# Patient Record
Sex: FEMALE | Race: BLACK OR AFRICAN AMERICAN | NOT HISPANIC OR LATINO | Employment: OTHER | URBAN - METROPOLITAN AREA
[De-identification: names, ages, dates, MRNs, and addresses within clinical notes are randomized per-mention and may not be internally consistent; named-entity substitution may affect disease eponyms.]

---

## 2023-03-21 PROBLEM — R11.0 NAUSEA IN ADULT: Status: ACTIVE | Noted: 2023-03-21

## 2023-03-21 PROBLEM — M54.50 LOW BACK PAIN, UNSPECIFIED: Status: ACTIVE | Noted: 2023-03-21

## 2023-03-21 PROBLEM — M25.519 TRIGGER POINT OF SHOULDER REGION: Status: ACTIVE | Noted: 2023-03-21

## 2023-03-21 PROBLEM — E55.9 HYPOVITAMINOSIS D: Status: ACTIVE | Noted: 2023-03-21

## 2023-03-21 PROBLEM — K59.00 CONSTIPATION: Status: ACTIVE | Noted: 2023-03-21

## 2023-03-21 PROBLEM — M62.838 NECK MUSCLE SPASM: Status: ACTIVE | Noted: 2023-03-21

## 2023-03-21 PROBLEM — G56.01 CARPAL TUNNEL SYNDROME, RIGHT: Status: ACTIVE | Noted: 2023-03-21

## 2023-03-21 PROBLEM — M26.609 TMJ DYSFUNCTION: Status: ACTIVE | Noted: 2023-03-21

## 2023-03-21 PROBLEM — S16.1XXA CERVICAL STRAIN: Status: ACTIVE | Noted: 2023-03-21

## 2023-03-21 PROBLEM — E55.9 VITAMIN D DEFICIENCY: Status: ACTIVE | Noted: 2023-03-21

## 2023-03-21 PROBLEM — J30.2 SEASONAL ALLERGIES: Status: ACTIVE | Noted: 2023-03-21

## 2023-03-21 PROBLEM — N89.8 VAGINAL DRYNESS: Status: ACTIVE | Noted: 2023-03-21

## 2023-03-21 PROBLEM — R10.9 ABDOMINAL PAIN: Status: ACTIVE | Noted: 2023-03-21

## 2023-03-21 PROBLEM — J45.20 ASTHMA, INTERMITTENT (HHS-HCC): Status: ACTIVE | Noted: 2023-03-21

## 2023-03-21 PROBLEM — T78.40XA ALLERGIC REACTION: Status: ACTIVE | Noted: 2023-03-21

## 2023-03-21 PROBLEM — G89.29 CHRONIC BACK PAIN: Status: ACTIVE | Noted: 2023-03-21

## 2023-03-21 PROBLEM — L05.91 PILONIDAL CYST: Status: ACTIVE | Noted: 2023-03-21

## 2023-03-21 PROBLEM — R73.9 BORDERLINE HYPERGLYCEMIA: Status: ACTIVE | Noted: 2023-03-21

## 2023-03-21 PROBLEM — D72.829 LEUKOCYTOSIS: Status: ACTIVE | Noted: 2023-03-21

## 2023-03-21 PROBLEM — I87.1 NUTCRACKER PHENOMENON OF RENAL VEIN: Status: ACTIVE | Noted: 2023-03-21

## 2023-03-21 PROBLEM — R10.2 FEMALE PELVIC PAIN: Status: ACTIVE | Noted: 2023-03-21

## 2023-03-21 PROBLEM — M54.9 CHRONIC BACK PAIN: Status: ACTIVE | Noted: 2023-03-21

## 2023-03-21 PROBLEM — N46.9 INFERTILITY MALE: Status: ACTIVE | Noted: 2023-03-21

## 2023-03-21 PROBLEM — H93.11 SUBJECTIVE TINNITUS OF RIGHT EAR: Status: ACTIVE | Noted: 2023-03-21

## 2023-03-21 PROBLEM — N94.89 PELVIC CONGESTION SYNDROME: Status: ACTIVE | Noted: 2023-03-21

## 2023-03-21 PROBLEM — N89.8 VAGINAL DISCHARGE: Status: ACTIVE | Noted: 2023-03-21

## 2023-03-21 PROBLEM — L05.01 PILONIDAL CYST WITH ABSCESS: Status: ACTIVE | Noted: 2023-03-21

## 2023-03-21 PROBLEM — G56.02 CARPAL TUNNEL SYNDROME, LEFT: Status: ACTIVE | Noted: 2023-03-21

## 2023-03-21 PROBLEM — R04.0 FREQUENT NOSEBLEEDS: Status: ACTIVE | Noted: 2023-03-21

## 2023-03-21 PROBLEM — R68.82 LOW LIBIDO: Status: ACTIVE | Noted: 2023-03-21

## 2023-03-21 PROBLEM — K58.1 IRRITABLE BOWEL SYNDROME WITH CONSTIPATION: Status: ACTIVE | Noted: 2023-03-21

## 2023-03-21 PROBLEM — J30.89 ENVIRONMENTAL AND SEASONAL ALLERGIES: Status: ACTIVE | Noted: 2023-03-21

## 2023-03-21 PROBLEM — G62.9 NEUROPATHY: Status: ACTIVE | Noted: 2023-03-21

## 2023-03-21 PROBLEM — I48.92 ATRIAL FLUTTER (MULTI): Status: ACTIVE | Noted: 2023-03-21

## 2023-03-21 PROBLEM — R10.9 LEFT FLANK PAIN: Status: ACTIVE | Noted: 2023-03-21

## 2023-03-21 PROBLEM — M41.9 SCOLIOSIS: Status: ACTIVE | Noted: 2023-03-21

## 2023-03-21 PROBLEM — H93.11 TINNITUS, RIGHT EAR: Status: ACTIVE | Noted: 2023-03-21

## 2023-03-21 PROBLEM — N93.9 ABNORMAL UTERINE BLEEDING (AUB): Status: ACTIVE | Noted: 2023-03-21

## 2023-03-21 PROBLEM — S39.012A LUMBAR STRAIN, INITIAL ENCOUNTER: Status: ACTIVE | Noted: 2023-03-21

## 2023-03-21 PROBLEM — H90.3 BILATERAL SENSORINEURAL HEARING LOSS: Status: ACTIVE | Noted: 2023-03-21

## 2023-03-21 PROBLEM — F32.A DEPRESSION: Status: ACTIVE | Noted: 2023-03-21

## 2023-03-21 PROBLEM — G56.03 CARPAL TUNNEL SYNDROME, BILATERAL UPPER LIMBS: Status: ACTIVE | Noted: 2023-03-21

## 2023-03-21 PROBLEM — K14.8 TONGUE BITING: Status: ACTIVE | Noted: 2023-03-21

## 2023-03-21 PROBLEM — R68.89 SENSATION OF FEELING COLD: Status: ACTIVE | Noted: 2023-03-21

## 2023-03-21 PROBLEM — R51.9 SINUS HEADACHE: Status: ACTIVE | Noted: 2023-03-21

## 2023-03-21 RX ORDER — ALBUTEROL SULFATE 0.83 MG/ML
2.5 SOLUTION RESPIRATORY (INHALATION) EVERY 4 HOURS PRN
COMMUNITY
Start: 2016-07-26

## 2023-03-21 RX ORDER — FLUTICASONE PROPIONATE 50 MCG
1 SPRAY, SUSPENSION (ML) NASAL DAILY
COMMUNITY
Start: 2019-06-03 | End: 2023-11-22 | Stop reason: ALTCHOICE

## 2023-03-21 RX ORDER — ALBUTEROL SULFATE 90 UG/1
1-2 AEROSOL, METERED RESPIRATORY (INHALATION) EVERY 4 HOURS PRN
COMMUNITY
End: 2023-05-26 | Stop reason: SDUPTHER

## 2023-03-21 RX ORDER — LACTULOSE 10 G/15ML
10 SOLUTION ORAL 2 TIMES DAILY
COMMUNITY
End: 2024-01-11 | Stop reason: WASHOUT

## 2023-03-21 RX ORDER — HYDROCORTISONE 25 MG/G
CREAM TOPICAL
COMMUNITY
Start: 2022-08-18

## 2023-03-21 RX ORDER — CALCIUM CARBONATE 300MG(750)
1 TABLET,CHEWABLE ORAL DAILY
COMMUNITY
Start: 2020-03-09 | End: 2023-05-26 | Stop reason: ALTCHOICE

## 2023-03-21 RX ORDER — IBUPROFEN 600 MG/1
600 TABLET ORAL EVERY 8 HOURS PRN
COMMUNITY
End: 2024-05-08

## 2023-03-21 RX ORDER — PREGABALIN 25 MG/1
1 CAPSULE ORAL 3 TIMES DAILY
COMMUNITY
Start: 2022-07-13 | End: 2023-05-26 | Stop reason: ALTCHOICE

## 2023-03-21 RX ORDER — POLYETHYLENE GLYCOL 3350 17 G/17G
POWDER, FOR SOLUTION ORAL
COMMUNITY
Start: 2021-07-27 | End: 2023-05-26 | Stop reason: ALTCHOICE

## 2023-03-21 RX ORDER — ACETAMINOPHEN 500 MG
5000 TABLET ORAL DAILY
COMMUNITY
End: 2023-05-26 | Stop reason: ALTCHOICE

## 2023-03-21 RX ORDER — HYOSCYAMINE SULFATE 0.12 MG/1
0.12 TABLET, ORALLY DISINTEGRATING ORAL 3 TIMES DAILY PRN
COMMUNITY
End: 2023-05-26 | Stop reason: ALTCHOICE

## 2023-03-21 RX ORDER — CYCLOBENZAPRINE HCL 5 MG
5-10 TABLET ORAL EVERY 8 HOURS PRN
COMMUNITY
Start: 2020-03-09 | End: 2023-12-12

## 2023-03-22 ENCOUNTER — TELEMEDICINE (OUTPATIENT)
Dept: PRIMARY CARE | Facility: CLINIC | Age: 33
End: 2023-03-22
Payer: COMMERCIAL

## 2023-03-22 DIAGNOSIS — I48.92 ATRIAL FLUTTER, UNSPECIFIED TYPE (MULTI): ICD-10-CM

## 2023-03-22 DIAGNOSIS — J06.9 VIRAL UPPER RESPIRATORY TRACT INFECTION: Primary | ICD-10-CM

## 2023-03-22 PROCEDURE — 99213 OFFICE O/P EST LOW 20 MIN: CPT | Performed by: INTERNAL MEDICINE

## 2023-03-22 RX ORDER — AMOXICILLIN AND CLAVULANATE POTASSIUM 500; 125 MG/1; MG/1
500 TABLET, FILM COATED ORAL 3 TIMES DAILY
Qty: 30 TABLET | Refills: 0 | Status: SHIPPED | OUTPATIENT
Start: 2023-03-22 | End: 2023-04-01

## 2023-03-22 RX ORDER — BENZONATATE 100 MG/1
100 CAPSULE ORAL 3 TIMES DAILY PRN
Qty: 42 CAPSULE | Refills: 0 | Status: SHIPPED | OUTPATIENT
Start: 2023-03-22 | End: 2023-04-21

## 2023-03-22 ASSESSMENT — ENCOUNTER SYMPTOMS
HEADACHES: 1
ABDOMINAL PAIN: 0
VOMITING: 0
SHORTNESS OF BREATH: 1
DIARRHEA: 0
STRIDOR: 0
TROUBLE SWALLOWING: 0
NECK PAIN: 0
COUGH: 1
SWOLLEN GLANDS: 0
SORE THROAT: 1
HOARSE VOICE: 1

## 2023-03-22 NOTE — PROGRESS NOTES
Subjective   Patient ID: Armida Adkins is a 32 y.o. female who presents for URI.  URI   This is a new problem. The current episode started in the past 7 days. Associated symptoms include congestion, coughing, headaches and a sore throat. Pertinent negatives include no abdominal pain, diarrhea, ear pain, neck pain, plugged ear sensation, swollen glands or vomiting.   Sore Throat   This is a new problem. The current episode started in the past 7 days. The problem has been rapidly worsening. Neither side of throat is experiencing more pain than the other. There has been no fever. The fever has been present for 1 to 2 days. The pain is at a severity of 8/10. Associated symptoms include congestion, coughing, headaches, a hoarse voice and shortness of breath. Pertinent negatives include no abdominal pain, diarrhea, drooling, ear discharge, ear pain, plugged ear sensation, neck pain, stridor, swollen glands, trouble swallowing or vomiting. She has had no exposure to strep or mono.       Review of Systems   HENT:  Positive for congestion, hoarse voice and sore throat. Negative for drooling, ear discharge, ear pain and trouble swallowing.    Respiratory:  Positive for cough and shortness of breath. Negative for stridor.    Gastrointestinal:  Negative for abdominal pain, diarrhea and vomiting.   Musculoskeletal:  Negative for neck pain.   Neurological:  Positive for headaches.       Objective   Physical Exam    Assessment/Plan   Problem List Items Addressed This Visit          Circulatory    Atrial flutter (CMS/HCC)     Other Visit Diagnoses       Viral upper respiratory tract infection    -  Primary    Relevant Medications    amoxicillin-pot clavulanate (Augmentin) 500-125 mg tablet    benzonatate (Tessalon) 100 mg capsule        An interactive audio and video telecommunication system which permits real time communications between the patient (at the originating site) and provider (at the distant site) was utilized to  provide this telehealth service.    Verbal consent was requested and obtained from the patient on the day of encounter.  Viral URI.  Supportive therapy with fluids, PRN NSAIDs or Tylenol and Tessalon pearls for cough.  Use saline spray every 2-3 hrs.  Start Augmentin as well for complicated symptoms and duration.           Lorin Chung MD

## 2023-04-07 LAB
ANION GAP IN SER/PLAS: 10 MMOL/L (ref 10–20)
CALCIUM (MG/DL) IN SER/PLAS: 9.2 MG/DL (ref 8.6–10.6)
CARBON DIOXIDE, TOTAL (MMOL/L) IN SER/PLAS: 28 MMOL/L (ref 21–32)
CHLORIDE (MMOL/L) IN SER/PLAS: 104 MMOL/L (ref 98–107)
CREATININE (MG/DL) IN SER/PLAS: 0.78 MG/DL (ref 0.5–1.05)
ERYTHROCYTE DISTRIBUTION WIDTH (RATIO) BY AUTOMATED COUNT: 12.4 % (ref 11.5–14.5)
ERYTHROCYTE MEAN CORPUSCULAR HEMOGLOBIN CONCENTRATION (G/DL) BY AUTOMATED: 33 G/DL (ref 32–36)
ERYTHROCYTE MEAN CORPUSCULAR VOLUME (FL) BY AUTOMATED COUNT: 84 FL (ref 80–100)
ERYTHROCYTES (10*6/UL) IN BLOOD BY AUTOMATED COUNT: 4.42 X10E12/L (ref 4–5.2)
GFR FEMALE: >90 ML/MIN/1.73M2
GLUCOSE (MG/DL) IN SER/PLAS: 97 MG/DL (ref 74–99)
HEMATOCRIT (%) IN BLOOD BY AUTOMATED COUNT: 37.3 % (ref 36–46)
HEMOGLOBIN (G/DL) IN BLOOD: 12.3 G/DL (ref 12–16)
INR IN PPP BY COAGULATION ASSAY: 1.2 (ref 0.9–1.1)
LEUKOCYTES (10*3/UL) IN BLOOD BY AUTOMATED COUNT: 10.2 X10E9/L (ref 4.4–11.3)
NRBC (PER 100 WBCS) BY AUTOMATED COUNT: 0 /100 WBC (ref 0–0)
PLATELETS (10*3/UL) IN BLOOD AUTOMATED COUNT: 325 X10E9/L (ref 150–450)
POTASSIUM (MMOL/L) IN SER/PLAS: 3.9 MMOL/L (ref 3.5–5.3)
PROTHROMBIN TIME (PT) IN PPP BY COAGULATION ASSAY: 13.9 SEC (ref 9.8–13.4)
SODIUM (MMOL/L) IN SER/PLAS: 138 MMOL/L (ref 136–145)
UREA NITROGEN (MG/DL) IN SER/PLAS: 7 MG/DL (ref 6–23)

## 2023-04-10 ENCOUNTER — HOSPITAL ENCOUNTER (OUTPATIENT)
Dept: DATA CONVERSION | Facility: HOSPITAL | Age: 33
End: 2023-04-10
Attending: INTERNAL MEDICINE | Admitting: INTERNAL MEDICINE
Payer: COMMERCIAL

## 2023-04-10 DIAGNOSIS — I47.10 SUPRAVENTRICULAR TACHYCARDIA (CMS-HCC): ICD-10-CM

## 2023-05-16 ENCOUNTER — APPOINTMENT (OUTPATIENT)
Dept: PRIMARY CARE | Facility: CLINIC | Age: 33
End: 2023-05-16
Payer: COMMERCIAL

## 2023-05-26 ENCOUNTER — OFFICE VISIT (OUTPATIENT)
Dept: PRIMARY CARE | Facility: CLINIC | Age: 33
End: 2023-05-26
Payer: COMMERCIAL

## 2023-05-26 VITALS — BODY MASS INDEX: 22.31 KG/M2 | DIASTOLIC BLOOD PRESSURE: 60 MMHG | WEIGHT: 130 LBS | SYSTOLIC BLOOD PRESSURE: 100 MMHG

## 2023-05-26 DIAGNOSIS — R43.8 BLOODY TASTE IN MOUTH: ICD-10-CM

## 2023-05-26 DIAGNOSIS — Z00.00 ROUTINE GENERAL MEDICAL EXAMINATION AT HEALTH CARE FACILITY: Primary | ICD-10-CM

## 2023-05-26 DIAGNOSIS — F32.A DEPRESSION, UNSPECIFIED DEPRESSION TYPE: ICD-10-CM

## 2023-05-26 DIAGNOSIS — J45.20 MILD INTERMITTENT ASTHMA WITHOUT COMPLICATION (HHS-HCC): ICD-10-CM

## 2023-05-26 PROCEDURE — G0439 PPPS, SUBSEQ VISIT: HCPCS | Performed by: INTERNAL MEDICINE

## 2023-05-26 PROCEDURE — 1036F TOBACCO NON-USER: CPT | Performed by: INTERNAL MEDICINE

## 2023-05-26 PROCEDURE — 99213 OFFICE O/P EST LOW 20 MIN: CPT | Performed by: INTERNAL MEDICINE

## 2023-05-26 RX ORDER — ALBUTEROL SULFATE 90 UG/1
1-2 AEROSOL, METERED RESPIRATORY (INHALATION) EVERY 4 HOURS PRN
Qty: 18 G | Refills: 2 | Status: SHIPPED | OUTPATIENT
Start: 2023-05-26

## 2023-05-26 RX ORDER — ETONOGESTREL AND ETHINYL ESTRADIOL VAGINAL RING .015; .12 MG/D; MG/D
RING VAGINAL
COMMUNITY
Start: 2023-04-24 | End: 2024-05-20 | Stop reason: SDUPTHER

## 2023-05-26 ASSESSMENT — PATIENT HEALTH QUESTIONNAIRE - PHQ9
SUM OF ALL RESPONSES TO PHQ9 QUESTIONS 1 AND 2: 2
1. LITTLE INTEREST OR PLEASURE IN DOING THINGS: SEVERAL DAYS
2. FEELING DOWN, DEPRESSED OR HOPELESS: SEVERAL DAYS

## 2023-05-26 ASSESSMENT — ENCOUNTER SYMPTOMS
GASTROINTESTINAL NEGATIVE: 1
CONSTITUTIONAL NEGATIVE: 1
RESPIRATORY NEGATIVE: 1
CARDIOVASCULAR NEGATIVE: 1

## 2023-05-26 NOTE — PROGRESS NOTES
Subjective   Reason for Visit: Armida Adkins is an 32 y.o. female here for a Medicare Wellness visit.   The patient is being seen for the subsequent annual wellness visit and follow up.  Past Medical, Surgical and Family History: reviewed and updated in chart.   Interval History: Patient has not been hospitalized previously.   Medications and Supplements: Review of all medications by a prescribing practitioner or clinical pharmacist (such as prescriptions, OTCs, herbal therapies and supplements) documented in the medical record.    No, the patient is not using opioids.   Health Risk Assessment:. Paper HRA completed by patient and scanned into chart.   Depression/Suicide Screening:  .   Done  No falls in the past year.  No recent hospitalizations.  Advance care planning completed.         Reviewed all medications by prescribing practitioner or clinical pharmacist (such as prescriptions, OTCs, herbal therapies and supplements) and documented in the medical record.    Epistaxis (Nose Bleed)     Med Refill        Patient Care Team:  Lorin Chung MD as PCP - General     Review of Systems   Constitutional: Negative.    HENT:  Positive for nosebleeds.    Respiratory: Negative.     Cardiovascular: Negative.    Gastrointestinal: Negative.        Objective   Vitals:  /60   Wt 59 kg (130 lb)   BMI 22.31 kg/m²       Physical Exam  Constitutional:       Appearance: She is well-developed.   Cardiovascular:      Rate and Rhythm: Normal rate and regular rhythm.      Heart sounds: Normal heart sounds. No murmur heard.  Pulmonary:      Effort: Pulmonary effort is normal.      Breath sounds: Normal breath sounds.   Abdominal:      General: Bowel sounds are normal.      Palpations: Abdomen is soft.         Assessment/Plan   Problem List Items Addressed This Visit          Nervous    Bloody taste in mouth    Relevant Orders    Referral to ENT       Respiratory    Asthma, intermittent    Relevant Medications     albuterol 90 mcg/actuation inhaler       Other    Depression    Relevant Orders    Referral to [facility]     Other Visit Diagnoses       Routine general medical examination at health care facility    -  Primary        Follow up and wellness  Has blood taste in the mouth  Recommend ENT referral  Also recommend dental referral for nightguard placements    A flutter  Follow up with cards    Depression  Off and on  Mostly stress related  Psychology referral placed

## 2023-08-10 LAB
ATRIAL RATE: 76 BPM
P AXIS: 70 DEGREES
P OFFSET: 203 MS
P ONSET: 150 MS
PR INTERVAL: 154 MS
Q ONSET: 227 MS
QRS COUNT: 12 BEATS
QRS DURATION: 92 MS
QT INTERVAL: 368 MS
QTC CALCULATION(BAZETT): 414 MS
QTC FREDERICIA: 398 MS
R AXIS: 83 DEGREES
T AXIS: 63 DEGREES
T OFFSET: 411 MS
VENTRICULAR RATE: 76 BPM

## 2023-08-23 PROBLEM — K21.9 GERD (GASTROESOPHAGEAL REFLUX DISEASE): Status: ACTIVE | Noted: 2023-08-23

## 2023-08-23 PROBLEM — N95.2 VAGINAL ATROPHY: Status: ACTIVE | Noted: 2023-08-23

## 2023-08-23 PROBLEM — J32.9 CHRONIC SINUSITIS: Status: ACTIVE | Noted: 2023-08-23

## 2023-08-23 PROBLEM — I47.19 AVNRT (AV NODAL RE-ENTRY TACHYCARDIA) (CMS-HCC): Status: ACTIVE | Noted: 2023-08-23

## 2023-08-23 PROBLEM — J02.9 ACUTE PHARYNGITIS: Status: ACTIVE | Noted: 2023-08-23

## 2023-08-23 PROBLEM — J01.90 ACUTE SINUSITIS: Status: ACTIVE | Noted: 2023-08-23

## 2023-08-23 PROBLEM — J34.2 DEVIATED NASAL SEPTUM: Status: ACTIVE | Noted: 2023-08-23

## 2023-08-23 RX ORDER — DULOXETIN HYDROCHLORIDE 60 MG/1
1 CAPSULE, DELAYED RELEASE ORAL NIGHTLY
COMMUNITY
Start: 2023-03-30 | End: 2023-12-12

## 2023-09-14 NOTE — H&P
History & Physical Reviewed:   Pregnant/Lactating:  ·  Are You Pregnant no   ·  Are You Currently Breastfeeding no     I have reviewed the History and Physical dated:  30-Mar-2023   History and Physical reviewed and relevant findings noted. Patient examined to review pertinent physical  findings.: No significant changes   Home Medications Reviewed: no changes noted   Allergies Reviewed: no changes noted       Airway/Sedation Assessment:  ·  Emotional Status calm   ·  Neurologic alert & oriented x 3   ·  Respiratory clear to auscultation   ·  Cardiovascular rhythm & rate regular   ·  GI/ soft, nontender     · Pulses present: Pedal Left, Pedal Right, Radial Left, Radial Right     ·  Mouth Opening OK yes   ·  Neck Flexibility OK yes   ·  Loose Teeth no   ·  Oropharyngeal Classification Class I   ·  ASA PS Classification ASA III   ·  Sedation Plan moderate sedation       ERAS (Enhanced Recovery After Surgery):  ·  ERAS Patient: no     Consent:   COVID-19 Consent:  ·  COVID-19 Risk Consent Surgeon has reviewed key risks related to the risk of adarsh COVID-19 and if they contract COVID-19 what the risks are.     Assessment/Plan:   ·  Assessment and Plan    32 Year old female with SVT presents for RFCA with Dr Turpin.       Electronic Signatures:  May Roblero (PAC)  (Signed 10-Apr-2023 13:20)   Authored: History & Physical Reviewed, Airway/Sedation,  ERAS, Consent, Assessment/Plan, Note Completion      Last Updated: 10-Apr-2023 13:20 by May Roblero (PAC)

## 2023-10-02 ENCOUNTER — TELEPHONE (OUTPATIENT)
Dept: OBSTETRICS AND GYNECOLOGY | Facility: CLINIC | Age: 33
End: 2023-10-02

## 2023-10-02 NOTE — TELEPHONE ENCOUNTER
Patient will send paperwork for return to work for October 9.    Paperwork received and ready for signature.

## 2023-10-03 ENCOUNTER — OFFICE VISIT (OUTPATIENT)
Dept: OTOLARYNGOLOGY | Facility: CLINIC | Age: 33
End: 2023-10-03
Payer: COMMERCIAL

## 2023-10-03 VITALS — BODY MASS INDEX: 21.97 KG/M2 | WEIGHT: 128 LBS

## 2023-10-03 DIAGNOSIS — J01.91 ACUTE RECURRENT SINUSITIS, UNSPECIFIED LOCATION: Primary | ICD-10-CM

## 2023-10-03 DIAGNOSIS — J31.0 CHRONIC RHINITIS: ICD-10-CM

## 2023-10-03 DIAGNOSIS — R04.0 FREQUENT NOSEBLEEDS: ICD-10-CM

## 2023-10-03 PROCEDURE — 1036F TOBACCO NON-USER: CPT | Performed by: GENERAL PRACTICE

## 2023-10-03 PROCEDURE — 99213 OFFICE O/P EST LOW 20 MIN: CPT | Performed by: GENERAL PRACTICE

## 2023-10-03 RX ORDER — MOMETASONE FUROATE 50 UG/1
2 SPRAY, METERED NASAL 2 TIMES DAILY
Qty: 17 G | Refills: 1 | Status: SHIPPED | OUTPATIENT
Start: 2023-10-03 | End: 2023-10-27

## 2023-10-03 ASSESSMENT — ENCOUNTER SYMPTOMS: DEPRESSION: 0

## 2023-10-20 ENCOUNTER — TELEPHONE (OUTPATIENT)
Dept: OBSTETRICS AND GYNECOLOGY | Facility: CLINIC | Age: 33
End: 2023-10-20
Payer: COMMERCIAL

## 2023-10-20 ENCOUNTER — TELEPHONE (OUTPATIENT)
Dept: RADIOLOGY | Facility: CLINIC | Age: 33
End: 2023-10-20

## 2023-10-20 NOTE — TELEPHONE ENCOUNTER
Patient wants to scheduled trigger point injections.    Will discuss with Dr. Stanton and further advise.

## 2023-10-26 DIAGNOSIS — J31.0 CHRONIC RHINITIS: ICD-10-CM

## 2023-10-27 RX ORDER — MOMETASONE FUROATE 50 UG/1
2 SPRAY, METERED NASAL 2 TIMES DAILY
Qty: 10 ML | Refills: 1 | Status: SHIPPED | OUTPATIENT
Start: 2023-10-27 | End: 2023-11-28

## 2023-11-22 ENCOUNTER — OFFICE VISIT (OUTPATIENT)
Dept: VASCULAR SURGERY | Facility: CLINIC | Age: 33
End: 2023-11-22
Payer: COMMERCIAL

## 2023-11-22 VITALS
DIASTOLIC BLOOD PRESSURE: 73 MMHG | HEIGHT: 64 IN | BODY MASS INDEX: 22.14 KG/M2 | WEIGHT: 129.7 LBS | SYSTOLIC BLOOD PRESSURE: 112 MMHG | HEART RATE: 92 BPM

## 2023-11-22 DIAGNOSIS — R10.2 PELVIC PAIN: ICD-10-CM

## 2023-11-22 DIAGNOSIS — R10.30 LOWER ABDOMINAL PAIN: Primary | ICD-10-CM

## 2023-11-22 PROCEDURE — 99214 OFFICE O/P EST MOD 30 MIN: CPT | Performed by: SURGERY

## 2023-11-22 PROCEDURE — 1036F TOBACCO NON-USER: CPT | Performed by: SURGERY

## 2023-11-22 ASSESSMENT — PATIENT HEALTH QUESTIONNAIRE - PHQ9
SUM OF ALL RESPONSES TO PHQ9 QUESTIONS 1 AND 2: 0
1. LITTLE INTEREST OR PLEASURE IN DOING THINGS: NOT AT ALL
2. FEELING DOWN, DEPRESSED OR HOPELESS: NOT AT ALL

## 2023-11-22 ASSESSMENT — COLUMBIA-SUICIDE SEVERITY RATING SCALE - C-SSRS
1. IN THE PAST MONTH, HAVE YOU WISHED YOU WERE DEAD OR WISHED YOU COULD GO TO SLEEP AND NOT WAKE UP?: NO
6. HAVE YOU EVER DONE ANYTHING, STARTED TO DO ANYTHING, OR PREPARED TO DO ANYTHING TO END YOUR LIFE?: NO
2. HAVE YOU ACTUALLY HAD ANY THOUGHTS OF KILLING YOURSELF?: NO

## 2023-11-22 NOTE — H&P (VIEW-ONLY)
F/U REASON: chronic pelvic pain, post-coital pain    CURRENT ENCOUNTER:  Armida Adkins is 33 y.o. female here for follow up of  chronic pelvic pain, post-coital pain.  Interval history is notable for laparoscopic peritoneal stripping for endometriosis workup which was negative.  She was not able to schedule appointments with the pelvic floor therapist nor the scoliosis surgeons.  Some time has passed since my referral so it is somewhat unclear what the scenario is worth that prevented these things.    We reviewed her symptoms once again.  She remains with pain with intercourse as well as some lingering postcoital symptoms.  Her left flank anterior pain remains intermittent and random with mostly anterior based and somewhat towards the back.    We discussed the importance of following through with her prior referrals which I will help her with once again.  I would otherwise proceed with a diagnostic venogram from the neck to evaluate her abdominal and pelvic venous anatomy and evaluate for compression and reflux to see if it correlates with her symptoms.  She denies any leg symptoms including pain or edema.    Meds:  Current Outpatient Medications:     albuterol 2.5 mg /3 mL (0.083 %) nebulizer solution, Inhale 3 mL (2.5 mg) every 4 hours if needed for wheezing., Disp: , Rfl:     albuterol 90 mcg/actuation inhaler, Inhale 1-2 puffs every 4 hours if needed for shortness of breath., Disp: 18 g, Rfl: 2    cyclobenzaprine (Flexeril) 5 mg tablet, Take 1-2 tablets (5-10 mg) by mouth every 8 hours if needed for muscle spasms., Disp: , Rfl:     DULoxetine (Cymbalta) 60 mg DR capsule, Take 1 capsule (60 mg) by mouth once daily at bedtime., Disp: , Rfl:     etonogestreL-ethinyl estradioL (Nuvaring) 0.12-0.015 mg/24 hr vaginal ring, Insert into the vagina., Disp: , Rfl:     hydrocortisone 2.5 % cream, Apply twice daily to affected areas, Disp: , Rfl:     lactulose 10 gram/15 mL (15 mL) solution, Take 10 g by mouth in the  morning and 10 g before bedtime., Disp: , Rfl:     mometasone (Nasonex) 50 mcg/actuation nasal spray, ADMINISTER 2 SPRAYS INTO EACH NOSTRIL 2 TIMES A DAY., Disp: 10 mL, Rfl: 1    naloxone (Narcan) 4 mg/0.1 mL nasal spray, INSTILL 1 SPRAY IN ONE NOSTRIL AS NEEDED FOR ACCIDENTAL OPIOID OVERDOSE; REPEAT WITH SECOND DOSE IN 2-3 MINUTES IF NO RESPONSE, Disp: 2 each, Rfl: 0    oxyCODONE (Roxicodone) 5 mg/5 mL solution, TAKE 5 ML BY MOUTH EVERY 6 HOURS AS NEEDED FOR MODERATE PAIN (PAIN SCORE 4-6), Disp: 50 mL, Rfl: 0    acetaminophen (Tylenol) 160 mg/5 mL liquid, TAKE 15 ML BY MOUTH EVERY 6 HOURS AS NEEDED, Disp: 105 mL, Rfl: 0    ibuprofen 600 mg tablet, Take 1 tablet (600 mg) by mouth every 8 hours if needed for mild pain (1 - 3)., Disp: , Rfl:     Allergies:   Allergies   Allergen Reactions    Benzonatate Unknown    Erythromycin-Benzoyl Peroxide Unknown    Fruit Flavor Unknown    Hydrocortisone Unknown    Pollen Extracts Unknown    Sulfamethoxazole-Trimethoprim Swelling       ROS:  Review of Systems otherwise unremarkable    Objective:  Vitals:  Vitals:    11/22/23 1519   BP: 112/73   Pulse: 92        Exam:  No distress  Breathing comfortably.  No upper or lower extremity edema nor varicosities.  Abdomen fairly benign with healed laparoscopic incisions.  No evidence of lower extremity varicosities.    Labs:  Lab Results   Component Value Date    WBC 10.3 09/10/2023    WBC 13.9 (H) 09/06/2023    WBC 8.2 08/30/2023    HGB 11.0 (L) 09/10/2023    HGB 11.2 (L) 09/06/2023    HGB 12.2 08/30/2023    HCT 33.6 (L) 09/10/2023    HCT 32.7 (L) 09/06/2023    HCT 37.6 08/30/2023    MCV 85 09/10/2023    MCV 81 09/06/2023    MCV 84 08/30/2023     09/10/2023     Lab Results   Component Value Date    CREATININE 0.83 09/10/2023    CREATININE 0.78 09/06/2023    CREATININE 0.74 08/30/2023    BUN 9 09/10/2023    BUN 10 09/06/2023    BUN 9 08/30/2023     (L) 09/10/2023     09/06/2023     08/30/2023    K 3.6 09/10/2023     K 3.6 09/06/2023    K 4.2 08/30/2023     09/10/2023     09/06/2023     08/30/2023    CO2 26 09/10/2023    CO2 24 09/06/2023    CO2 28 08/30/2023       Assessment & Plan:  Armida Adkins is 33 y.o. female with pelvic pain, intermittent left mostly anterior flank pain.     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 Sanchez 2/9/2023. Pt states that she has been experience intermittent left flank pain and pelvic pain for approximately three years The pelvic pain leads to significant dyspareunia. Pt also experiences left abdominal pain during intercourse that is severe and bloating with abdominal pain during menses. No alleviating or aggravating pain The pain is intermittent  Describes it as sharp and severe She underwent a venogram which demonstrated a pressure differential between the left renal vein and IVC of 4 mmHg  After the procedure the pt went into afib with RVR and underwent cardioversion  Now she is on Eliquis  She is currently wearing Biopatch monitor      + left flank pain at random times, no hematuria, + abdominal bloating     do not think her left anterior upper quadrant pain is related to nutcracker left renal vein compression physiology  scoliosis and chest wall deformity may have something to do with this - will refer.   somewhat dilated gonadal vein - pelvic pain - venous hypertension may be a partial contributor to this      plan:  1) we discussion several things including possible pelvic congestion syndrome.   2) i do not think you have nutcracker sydnrome. I do think your left upper rib cage pain may be related to a musculoskeletal issue and perhaps to your scoliosis - i will reach out to some colleagues to see who would be best to see you    1) we will proceed with an outpatient venogram to evaluate the veins in the pelvis and any varicose veins  2) this will be 12/11/2023 at [facility] in [facility].   3) my office  will call as you near your surgery date to give you further instructions  4) I'll reach to Dr. Stanton and Ermelinda about your prior referrals.     Marianna Ordaz MD, MHS, RPVI  , [employer]  Director, [facility], [facility] [place]  Co-Director, Vascular Laboratories, [facility] [place]  Division of Vascular Surgery and Endovascular Therapy  [facility]

## 2023-11-22 NOTE — PATIENT INSTRUCTIONS
It was a pleasure taking care of you today and appreciate your seeing us at our [facility] [place] [facility].     Today's plan is as follows:  1) we will proceed with an outpatient venogram to evaluate the veins in the pelvis and any varicose veins  2) this will be 12/11/2023 at [facility] in [facility].   3) my office will call as you near your surgery date to give you further instructions  4) I'll reach to Dr. Stanton and Ermelinda about your prior referrals.       Please call the office with any questions at [phone].   You can speak to our secretaries or our clinical nurses for specific questions.   For Vein Center specific questions, you can also call [phone] or email at [email or web address]  If you need coordinating your appointments and testing you can do these at the  or by calling my office shortly after your visit.

## 2023-11-28 DIAGNOSIS — J31.0 CHRONIC RHINITIS: ICD-10-CM

## 2023-11-28 RX ORDER — MOMETASONE FUROATE 50 UG/1
2 SPRAY, METERED NASAL 2 TIMES DAILY
Qty: 17 G | Refills: 2 | Status: SHIPPED | OUTPATIENT
Start: 2023-11-28 | End: 2023-12-12

## 2023-11-30 ENCOUNTER — APPOINTMENT (OUTPATIENT)
Dept: RADIOLOGY | Facility: HOSPITAL | Age: 33
End: 2023-11-30
Payer: COMMERCIAL

## 2023-11-30 ENCOUNTER — HOSPITAL ENCOUNTER (EMERGENCY)
Facility: HOSPITAL | Age: 33
Discharge: HOME | End: 2023-11-30
Attending: EMERGENCY MEDICINE
Payer: COMMERCIAL

## 2023-11-30 VITALS
HEART RATE: 84 BPM | BODY MASS INDEX: 21.85 KG/M2 | OXYGEN SATURATION: 100 % | RESPIRATION RATE: 17 BRPM | HEIGHT: 64 IN | WEIGHT: 128 LBS | DIASTOLIC BLOOD PRESSURE: 71 MMHG | TEMPERATURE: 98.7 F | SYSTOLIC BLOOD PRESSURE: 97 MMHG

## 2023-11-30 DIAGNOSIS — R07.9 CHEST PAIN, UNSPECIFIED TYPE: Primary | ICD-10-CM

## 2023-11-30 LAB
ALBUMIN SERPL BCP-MCNC: 4.2 G/DL (ref 3.4–5)
ALP SERPL-CCNC: 73 U/L (ref 33–110)
ALT SERPL W P-5'-P-CCNC: 9 U/L (ref 7–45)
ANION GAP SERPL CALC-SCNC: 12 MMOL/L (ref 10–20)
AST SERPL W P-5'-P-CCNC: 15 U/L (ref 9–39)
B-HCG SERPL-ACNC: <2 MIU/ML
BASOPHILS # BLD AUTO: 0.05 X10*3/UL (ref 0–0.1)
BASOPHILS NFR BLD AUTO: 0.6 %
BILIRUB SERPL-MCNC: 0.4 MG/DL (ref 0–1.2)
BUN SERPL-MCNC: 8 MG/DL (ref 6–23)
CALCIUM SERPL-MCNC: 9.4 MG/DL (ref 8.6–10.3)
CARDIAC TROPONIN I PNL SERPL HS: <3 NG/L (ref 0–13)
CHLORIDE SERPL-SCNC: 102 MMOL/L (ref 98–107)
CO2 SERPL-SCNC: 27 MMOL/L (ref 21–32)
CREAT SERPL-MCNC: 0.83 MG/DL (ref 0.5–1.05)
EOSINOPHIL # BLD AUTO: 0.23 X10*3/UL (ref 0–0.7)
EOSINOPHIL NFR BLD AUTO: 2.9 %
ERYTHROCYTE [DISTWIDTH] IN BLOOD BY AUTOMATED COUNT: 12.6 % (ref 11.5–14.5)
GFR SERPL CREATININE-BSD FRML MDRD: >90 ML/MIN/1.73M*2
GLUCOSE SERPL-MCNC: 77 MG/DL (ref 74–99)
HCT VFR BLD AUTO: 38.9 % (ref 36–46)
HGB BLD-MCNC: 12.9 G/DL (ref 12–16)
IMM GRANULOCYTES # BLD AUTO: 0.02 X10*3/UL (ref 0–0.7)
IMM GRANULOCYTES NFR BLD AUTO: 0.2 % (ref 0–0.9)
LYMPHOCYTES # BLD AUTO: 1.99 X10*3/UL (ref 1.2–4.8)
LYMPHOCYTES NFR BLD AUTO: 24.8 %
MAGNESIUM SERPL-MCNC: 2.2 MG/DL (ref 1.6–2.4)
MCH RBC QN AUTO: 27.4 PG (ref 26–34)
MCHC RBC AUTO-ENTMCNC: 33.2 G/DL (ref 32–36)
MCV RBC AUTO: 83 FL (ref 80–100)
MONOCYTES # BLD AUTO: 0.36 X10*3/UL (ref 0.1–1)
MONOCYTES NFR BLD AUTO: 4.5 %
NEUTROPHILS # BLD AUTO: 5.39 X10*3/UL (ref 1.2–7.7)
NEUTROPHILS NFR BLD AUTO: 67 %
NRBC BLD-RTO: 0 /100 WBCS (ref 0–0)
PLATELET # BLD AUTO: 294 X10*3/UL (ref 150–450)
POTASSIUM SERPL-SCNC: 4 MMOL/L (ref 3.5–5.3)
PROT SERPL-MCNC: 8 G/DL (ref 6.4–8.2)
RBC # BLD AUTO: 4.71 X10*6/UL (ref 4–5.2)
SODIUM SERPL-SCNC: 137 MMOL/L (ref 136–145)
TSH SERPL-ACNC: 1.14 MIU/L (ref 0.44–3.98)
WBC # BLD AUTO: 8 X10*3/UL (ref 4.4–11.3)

## 2023-11-30 PROCEDURE — 84484 ASSAY OF TROPONIN QUANT: CPT | Performed by: EMERGENCY MEDICINE

## 2023-11-30 PROCEDURE — 80053 COMPREHEN METABOLIC PANEL: CPT | Performed by: EMERGENCY MEDICINE

## 2023-11-30 PROCEDURE — 2500000004 HC RX 250 GENERAL PHARMACY W/ HCPCS (ALT 636 FOR OP/ED): Performed by: EMERGENCY MEDICINE

## 2023-11-30 PROCEDURE — 2500000001 HC RX 250 WO HCPCS SELF ADMINISTERED DRUGS (ALT 637 FOR MEDICARE OP): Performed by: EMERGENCY MEDICINE

## 2023-11-30 PROCEDURE — 84702 CHORIONIC GONADOTROPIN TEST: CPT | Performed by: EMERGENCY MEDICINE

## 2023-11-30 PROCEDURE — 83735 ASSAY OF MAGNESIUM: CPT | Performed by: EMERGENCY MEDICINE

## 2023-11-30 PROCEDURE — 99284 EMERGENCY DEPT VISIT MOD MDM: CPT | Mod: 25 | Performed by: EMERGENCY MEDICINE

## 2023-11-30 PROCEDURE — 71045 X-RAY EXAM CHEST 1 VIEW: CPT | Mod: FOREIGN READ | Performed by: RADIOLOGY

## 2023-11-30 PROCEDURE — 84443 ASSAY THYROID STIM HORMONE: CPT | Performed by: EMERGENCY MEDICINE

## 2023-11-30 PROCEDURE — 85025 COMPLETE CBC W/AUTO DIFF WBC: CPT | Performed by: EMERGENCY MEDICINE

## 2023-11-30 PROCEDURE — 36415 COLL VENOUS BLD VENIPUNCTURE: CPT | Performed by: EMERGENCY MEDICINE

## 2023-11-30 PROCEDURE — 96374 THER/PROPH/DIAG INJ IV PUSH: CPT

## 2023-11-30 PROCEDURE — 71045 X-RAY EXAM CHEST 1 VIEW: CPT | Mod: FY,FR

## 2023-11-30 RX ORDER — FAMOTIDINE 10 MG/ML
20 INJECTION INTRAVENOUS ONCE
Status: COMPLETED | OUTPATIENT
Start: 2023-11-30 | End: 2023-11-30

## 2023-11-30 RX ORDER — ALUMINUM HYDROXIDE, MAGNESIUM HYDROXIDE, AND SIMETHICONE 1200; 120; 1200 MG/30ML; MG/30ML; MG/30ML
10 SUSPENSION ORAL ONCE
Status: COMPLETED | OUTPATIENT
Start: 2023-11-30 | End: 2023-11-30

## 2023-11-30 RX ORDER — FAMOTIDINE 20 MG/1
20 TABLET, FILM COATED ORAL ONCE
Status: COMPLETED | OUTPATIENT
Start: 2023-11-30 | End: 2023-11-30

## 2023-11-30 RX ADMIN — FAMOTIDINE 20 MG: 20 TABLET, FILM COATED ORAL at 10:56

## 2023-11-30 RX ADMIN — ALUMINUM HYDROXIDE, MAGNESIUM HYDROXIDE, AND DIMETHICONE 10 ML: 200; 20; 200 SUSPENSION ORAL at 10:56

## 2023-11-30 RX ADMIN — FAMOTIDINE 20 MG: 10 INJECTION, SOLUTION INTRAVENOUS at 11:37

## 2023-11-30 ASSESSMENT — PAIN SCALES - GENERAL
PAINLEVEL_OUTOF10: 4
PAINLEVEL_OUTOF10: 4

## 2023-11-30 ASSESSMENT — PAIN - FUNCTIONAL ASSESSMENT: PAIN_FUNCTIONAL_ASSESSMENT: 0-10

## 2023-11-30 ASSESSMENT — PAIN DESCRIPTION - DESCRIPTORS: DESCRIPTORS: ACHING

## 2023-11-30 NOTE — ED PROVIDER NOTES
Limitations to History: None  Additional History Obtained from: None    HPI:    Patient presenting to the emergency department due to chest pain.  Patient states that she went to sleep last night normal state of health, woke up this morning and began having left-sided sharp nonradiating chest pain.  She states her symptoms started approximately 7:30 AM and have been constant since then.  She states her symptoms were made worse with an episode of vomiting.  Denies any remitting factors.  Denies any associated palpitations.  Did have associated dizziness as well as 1 episode of vomiting.  Denies any recent pain or swelling in her lower extremities, recent travel or use of hormonal medications.  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  She is not currently on medications for this.  Denies any associated palpitations.  She states she had previously been on anticoagulation related to this but was weaned off of it.  Her most recent episode was after surgical procedure several months ago.  Denies any other exacerbating remitting factors to her symptoms.  Review of systems is otherwise negative.    ------------------------------------------------------------------------------------------------------------------------------------------  Physical Exam:    ED Triage Vitals [11/30/23 0908]   Temp Heart Rate Resp BP   37.1 °C (98.7 °F) 89 17 114/82      SpO2 Temp src Heart Rate Source Patient Position   100 % -- -- --      BP Location FiO2 (%)     -- --        VS: As documented in the triage note and EMR flowsheet from this visit were reviewed.  General: Well appearing. No acute distress.   Eyes: Pupils round and reactive. No scleral icterus. No conjunctival injection  HENT: Atraumatic. Normocephalic. Moist mucous membranes. Trachea midline  CV: RRR, No MRG. No pedal edema appreciated.  Resp: Clear to auscultation bilaterally. Non-labored.    GI: Soft, nontender to palpation.  Nondistended. No guarding, rigidity or rebound  Skin: Warm, dry, intact. No systemic rashes or lesions appreciated.  Extremities: No deformities or pain out of proportion; pulses intact   Neuro: Alert. No focal motor or sensory deficits observed. Speech fluent. Answers questions appropriately.   Psych: Appropriate. [place].    ------------------------------------------------------------------------------------------------------------------------------------------    Medical Decision Making  33-year-old female presenting to the emergency department due to chest pain.  On exam patient is awake and alert and well-appearing.  Hemodynamically stable.  No signs of dysrhythmia on the monitor, no signs of fluid overload, and patient without risk factors for PE.  PERC negative.  EKG reviewed reviewed and interpreted by me, please see ED course for interpretation.  Due to the patient's cardiac history will obtain basic blood work including electrolytes, TSH and troponin.  If the patient symptoms are improving and if the patient's blood work is overall unremarkable feel the patient is stable for discharge home.  Concern for possible GI etiology due to worsening with vomiting.  Patient otherwise well-appearing at this time.  In agreement with the plan of care.      External Records Reviewed: I reviewed recent and relevant outside records including: n/a  Escalation of Care: Appropriate for Discharge per ED course/MDM  Social Determinants Affecting Care:Not applicable  Prescription Drug Consideration: gi cocktail  Diagnostic testing considered: xray, labs  Discussion of Management with Other Providers: I discussed the patient/results with: n/a    Objective Data  I have independently interpreted the following labs, imaging studies and MDM added to ED Course  Labs Reviewed   COMPREHENSIVE METABOLIC PANEL - Normal       Result Value    Glucose 77      Sodium 137      Potassium 4.0      Chloride 102      Bicarbonate 27      Anion Gap  12      Urea Nitrogen 8      Creatinine 0.83      eGFR >90      Calcium 9.4      Albumin 4.2      Alkaline Phosphatase 73      Total Protein 8.0      AST 15      Bilirubin, Total 0.4      ALT 9     TROPONIN I, HIGH SENSITIVITY - Normal    Troponin I, High Sensitivity <3      Narrative:     Less than 99th percentile of normal range cutoff-  Female and children under 18 years old <14 ng/L; Male <21 ng/L: Negative  Repeat testing should be performed if clinically indicated.     Female and children under 18 years old 14-50 ng/L; Male 21-50 ng/L:  Consistent with possible cardiac damage and possible increased clinical   risk. Serial measurements may help to assess extent of myocardial damage.     >50 ng/L: Consistent with cardiac damage, increased clinical risk and  myocardial infarction. Serial measurements may help assess extent of   myocardial damage.      NOTE: Children less than 1 year old may have higher baseline troponin   levels and results should be interpreted in conjunction with the overall   clinical context.     NOTE: Troponin I testing is performed using a different   testing methodology at [facility] than at other   [facility]. Direct result comparisons should only   be made within the same method.   MAGNESIUM - Normal    Magnesium 2.20     TSH WITH REFLEX TO FREE T4 IF ABNORMAL - Normal    Thyroid Stimulating Hormone 1.14      Narrative:     TSH testing is performed using different testing methodology at [facility] than at other [facility]. Direct result comparisons should only be made within the same method.     HUMAN CHORIONIC GONADOTROPIN, SERUM QUANTITATIVE - Normal    HCG, Beta-Quantitative <2      Narrative:      Total HCG measurement is performed using the [employer] Access   Immunoassay which detects intact HCG and free beta HCG subunit.    This test is not indicated for use as a tumor marker.   HCG testing is performed using a different test  methodology at [facility] than other [facility]. Direct result comparison   should only be made within the same method.       CBC WITH AUTO DIFFERENTIAL    WBC 8.0      nRBC 0.0      RBC 4.71      Hemoglobin 12.9      Hematocrit 38.9      MCV 83      MCH 27.4      MCHC 33.2      RDW 12.6      Platelets 294      Neutrophils % 67.0      Immature Granulocytes %, Automated 0.2      Lymphocytes % 24.8      Monocytes % 4.5      Eosinophils % 2.9      Basophils % 0.6      Neutrophils Absolute 5.39      Immature Granulocytes Absolute, Automated 0.02      Lymphocytes Absolute 1.99      Monocytes Absolute 0.36      Eosinophils Absolute 0.23      Basophils Absolute 0.05         XR chest 1 view   Final Result   No acute process.   Signed by Ronald Carrillo MD          ED Course  ED Course as of 11/30/23 1244   u Nov 30, 2023   1011 EKG reviewed and interpreted by me: sinus rhythm at 85 bpm, normal intervals; no corina/depression/twi; no change from previous EKG on 9/2023   [LP]   1127 Labs reviewed, overall unremarkable [LP]   1146 Chest x-ray showed chronic findings of scoliosis, no acute cardiac findings, hCG currently pending, labs otherwise unremarkable.  Feel the patient will likely be able to be discharged home with outpatient follow-up. [LP]   1243 On reevaluation the patient's pain is feeling improved after GI cocktail in the emergency department.  Labs here are stable, pregnancy test negative feel the patient symptoms are likely secondary to GI etiology due to the improvement with a GI cocktail and worsening with vomiting.  Patient hemodynamically stable, in agreement for discharge home with outpatient management.  Patient to be discharged home in stable condition. [LP]      ED Course User Index  [LP] Kelsea Lazar DO         Diagnoses as of 11/30/23 1244   Chest pain, unspecified type       Procedure  Procedures    Disposition: discharge    Kelsea Lazar DO  Emergency Medicine  Medical  Toxicology     Kelsea Lazar,   11/30/23 1242

## 2023-11-30 NOTE — ED TRIAGE NOTES
PT TO ED FROM HOME WITH CARDIAC PCI HX FOR C/O N/V LEFT SIDED NON RADIATING CONSISTENT CP THAT STARTED 1 HOUR PRIOR TO ARRIVAL.

## 2023-12-01 ENCOUNTER — PREP FOR PROCEDURE (OUTPATIENT)
Dept: VASCULAR SURGERY | Facility: HOSPITAL | Age: 33
End: 2023-12-01
Payer: COMMERCIAL

## 2023-12-01 DIAGNOSIS — R10.2 PELVIC PAIN: Primary | ICD-10-CM

## 2023-12-01 DIAGNOSIS — R09.89 PAINFUL VEINS: ICD-10-CM

## 2023-12-01 DIAGNOSIS — R52 PAINFUL VEINS: ICD-10-CM

## 2023-12-05 ENCOUNTER — APPOINTMENT (OUTPATIENT)
Dept: PRIMARY CARE | Facility: CLINIC | Age: 33
End: 2023-12-05
Payer: COMMERCIAL

## 2023-12-05 ENCOUNTER — APPOINTMENT (OUTPATIENT)
Dept: OBSTETRICS AND GYNECOLOGY | Facility: CLINIC | Age: 33
End: 2023-12-05
Payer: COMMERCIAL

## 2023-12-07 ENCOUNTER — TELEPHONE (OUTPATIENT)
Dept: VASCULAR MEDICINE | Facility: HOSPITAL | Age: 33
End: 2023-12-07
Payer: COMMERCIAL

## 2023-12-07 NOTE — TELEPHONE ENCOUNTER
Spoke with patient regarding pre-op instructions for 12/11 procedure with Dr. Ordaz.  Patient advised to be NPO after midnight night prior, no meds to take the a.m. of surgery, and need for transportation.  Patient had recent labs 11/30 so no need for updated bloodwork.  Per patient, no longer taking any blood thinners.  Patient verbalized understanding of all instructions. Encouraged her to call with questions/concerns in the meantime.

## 2023-12-11 ENCOUNTER — ANESTHESIA EVENT (OUTPATIENT)
Dept: OPERATING ROOM | Facility: HOSPITAL | Age: 33
End: 2023-12-11
Payer: COMMERCIAL

## 2023-12-11 ENCOUNTER — APPOINTMENT (OUTPATIENT)
Dept: RADIOLOGY | Facility: HOSPITAL | Age: 33
End: 2023-12-11
Payer: COMMERCIAL

## 2023-12-11 ENCOUNTER — HOSPITAL ENCOUNTER (OUTPATIENT)
Facility: HOSPITAL | Age: 33
Setting detail: OUTPATIENT SURGERY
Discharge: HOME | End: 2023-12-11
Attending: SURGERY | Admitting: SURGERY
Payer: COMMERCIAL

## 2023-12-11 ENCOUNTER — ANESTHESIA (OUTPATIENT)
Dept: OPERATING ROOM | Facility: HOSPITAL | Age: 33
End: 2023-12-11
Payer: COMMERCIAL

## 2023-12-11 VITALS
WEIGHT: 133.82 LBS | HEIGHT: 64 IN | HEART RATE: 72 BPM | OXYGEN SATURATION: 98 % | DIASTOLIC BLOOD PRESSURE: 70 MMHG | TEMPERATURE: 97.5 F | RESPIRATION RATE: 16 BRPM | SYSTOLIC BLOOD PRESSURE: 109 MMHG | BODY MASS INDEX: 22.85 KG/M2

## 2023-12-11 DIAGNOSIS — Z98.890 HISTORY OF GENERAL ANESTHESIA: ICD-10-CM

## 2023-12-11 DIAGNOSIS — R10.2 PELVIC PAIN: Primary | ICD-10-CM

## 2023-12-11 DIAGNOSIS — J31.0 CHRONIC RHINITIS: ICD-10-CM

## 2023-12-11 DIAGNOSIS — M54.16 RADICULOPATHY, LUMBAR REGION: ICD-10-CM

## 2023-12-11 DIAGNOSIS — R09.89 PAINFUL VEINS: ICD-10-CM

## 2023-12-11 DIAGNOSIS — R52 PAINFUL VEINS: ICD-10-CM

## 2023-12-11 DIAGNOSIS — Z87.39 PERSONAL HISTORY OF OTHER DISEASES OF THE MUSCULOSKELETAL SYSTEM AND CONNECTIVE TISSUE: ICD-10-CM

## 2023-12-11 DIAGNOSIS — G89.29 CHRONIC LEFT-SIDED THORACIC BACK PAIN: ICD-10-CM

## 2023-12-11 DIAGNOSIS — M54.6 CHRONIC LEFT-SIDED THORACIC BACK PAIN: ICD-10-CM

## 2023-12-11 LAB — PREGNANCY TEST URINE, POC: NEGATIVE

## 2023-12-11 PROCEDURE — 75822 VEIN X-RAY ARMS/LEGS: CPT | Performed by: SURGERY

## 2023-12-11 PROCEDURE — 75825 VEIN X-RAY TRUNK: CPT | Performed by: SURGERY

## 2023-12-11 PROCEDURE — 37252 INTRVASC US NONCORONARY 1ST: CPT | Performed by: SURGERY

## 2023-12-11 PROCEDURE — 81025 URINE PREGNANCY TEST: CPT | Performed by: STUDENT IN AN ORGANIZED HEALTH CARE EDUCATION/TRAINING PROGRAM

## 2023-12-11 PROCEDURE — 3700000002 HC GENERAL ANESTHESIA TIME - EACH INCREMENTAL 1 MINUTE: Performed by: SURGERY

## 2023-12-11 PROCEDURE — 76937 US GUIDE VASCULAR ACCESS: CPT | Performed by: SURGERY

## 2023-12-11 PROCEDURE — 2500000005 HC RX 250 GENERAL PHARMACY W/O HCPCS: Performed by: SURGERY

## 2023-12-11 PROCEDURE — A36010 PR PLACE CATH IN VEIN,SVC,IVC

## 2023-12-11 PROCEDURE — 3600000004 HC OR TIME - INITIAL BASE CHARGE - PROCEDURE LEVEL FOUR: Performed by: SURGERY

## 2023-12-11 PROCEDURE — 3600000009 HC OR TIME - EACH INCREMENTAL 1 MINUTE - PROCEDURE LEVEL FOUR: Performed by: SURGERY

## 2023-12-11 PROCEDURE — 7100000002 HC RECOVERY ROOM TIME - EACH INCREMENTAL 1 MINUTE: Performed by: SURGERY

## 2023-12-11 PROCEDURE — 7100000001 HC RECOVERY ROOM TIME - INITIAL BASE CHARGE: Performed by: SURGERY

## 2023-12-11 PROCEDURE — 36010 PLACE CATHETER IN VEIN: CPT | Performed by: SURGERY

## 2023-12-11 PROCEDURE — 2500000004 HC RX 250 GENERAL PHARMACY W/ HCPCS (ALT 636 FOR OP/ED): Performed by: SURGERY

## 2023-12-11 PROCEDURE — 3700000001 HC GENERAL ANESTHESIA TIME - INITIAL BASE CHARGE: Performed by: SURGERY

## 2023-12-11 PROCEDURE — 2500000004 HC RX 250 GENERAL PHARMACY W/ HCPCS (ALT 636 FOR OP/ED)

## 2023-12-11 PROCEDURE — 7100000010 HC PHASE TWO TIME - EACH INCREMENTAL 1 MINUTE: Performed by: SURGERY

## 2023-12-11 PROCEDURE — 75831 VEIN X-RAY KIDNEY: CPT | Performed by: SURGERY

## 2023-12-11 PROCEDURE — C1753 CATH, INTRAVAS ULTRASOUND: HCPCS | Performed by: SURGERY

## 2023-12-11 PROCEDURE — 37253 INTRVASC US NONCORONARY ADDL: CPT | Performed by: SURGERY

## 2023-12-11 PROCEDURE — 2550000001 HC RX 255 CONTRASTS: Performed by: SURGERY

## 2023-12-11 PROCEDURE — C1769 GUIDE WIRE: HCPCS | Performed by: SURGERY

## 2023-12-11 PROCEDURE — 7100000009 HC PHASE TWO TIME - INITIAL BASE CHARGE: Performed by: SURGERY

## 2023-12-11 PROCEDURE — A36010 PR PLACE CATH IN VEIN,SVC,IVC: Performed by: ANESTHESIOLOGY

## 2023-12-11 PROCEDURE — C1887 CATHETER, GUIDING: HCPCS | Performed by: SURGERY

## 2023-12-11 PROCEDURE — 2500000004 HC RX 250 GENERAL PHARMACY W/ HCPCS (ALT 636 FOR OP/ED): Performed by: ANESTHESIOLOGY

## 2023-12-11 PROCEDURE — C1894 INTRO/SHEATH, NON-LASER: HCPCS | Performed by: SURGERY

## 2023-12-11 PROCEDURE — 2720000007 HC OR 272 NO HCPCS: Performed by: SURGERY

## 2023-12-11 RX ORDER — PROPOFOL 10 MG/ML
INJECTION, EMULSION INTRAVENOUS CONTINUOUS PRN
Status: DISCONTINUED | OUTPATIENT
Start: 2023-12-11 | End: 2023-12-11

## 2023-12-11 RX ORDER — ONDANSETRON HYDROCHLORIDE 2 MG/ML
4 INJECTION, SOLUTION INTRAVENOUS ONCE AS NEEDED
Status: DISCONTINUED | OUTPATIENT
Start: 2023-12-11 | End: 2023-12-11 | Stop reason: HOSPADM

## 2023-12-11 RX ORDER — ALBUTEROL SULFATE 0.83 MG/ML
2.5 SOLUTION RESPIRATORY (INHALATION) EVERY 6 HOURS PRN
Status: CANCELLED | OUTPATIENT
Start: 2023-12-11

## 2023-12-11 RX ORDER — ALBUTEROL SULFATE 90 UG/1
1-2 AEROSOL, METERED RESPIRATORY (INHALATION) EVERY 4 HOURS PRN
Status: CANCELLED | OUTPATIENT
Start: 2023-12-11

## 2023-12-11 RX ORDER — HYDROMORPHONE HYDROCHLORIDE 1 MG/ML
0.5 INJECTION, SOLUTION INTRAMUSCULAR; INTRAVENOUS; SUBCUTANEOUS EVERY 5 MIN PRN
Status: DISCONTINUED | OUTPATIENT
Start: 2023-12-11 | End: 2023-12-11 | Stop reason: HOSPADM

## 2023-12-11 RX ORDER — ETONOGESTREL AND ETHINYL ESTRADIOL VAGINAL RING .015; .12 MG/D; MG/D
1 RING VAGINAL
Status: CANCELLED | OUTPATIENT
Start: 2023-12-12

## 2023-12-11 RX ORDER — DROPERIDOL 2.5 MG/ML
0.62 INJECTION, SOLUTION INTRAMUSCULAR; INTRAVENOUS ONCE AS NEEDED
Status: DISCONTINUED | OUTPATIENT
Start: 2023-12-11 | End: 2023-12-11 | Stop reason: HOSPADM

## 2023-12-11 RX ORDER — IBUPROFEN 600 MG/1
600 TABLET ORAL EVERY 8 HOURS PRN
Status: CANCELLED | OUTPATIENT
Start: 2023-12-11

## 2023-12-11 RX ORDER — SODIUM CHLORIDE, SODIUM LACTATE, POTASSIUM CHLORIDE, CALCIUM CHLORIDE 600; 310; 30; 20 MG/100ML; MG/100ML; MG/100ML; MG/100ML
100 INJECTION, SOLUTION INTRAVENOUS CONTINUOUS
Status: DISCONTINUED | OUTPATIENT
Start: 2023-12-11 | End: 2023-12-11 | Stop reason: HOSPADM

## 2023-12-11 RX ORDER — HYDROMORPHONE HYDROCHLORIDE 1 MG/ML
0.2 INJECTION, SOLUTION INTRAMUSCULAR; INTRAVENOUS; SUBCUTANEOUS EVERY 5 MIN PRN
Status: DISCONTINUED | OUTPATIENT
Start: 2023-12-11 | End: 2023-12-11 | Stop reason: HOSPADM

## 2023-12-11 RX ORDER — IODIXANOL 320 MG/ML
INJECTION, SOLUTION INTRAVASCULAR AS NEEDED
Status: DISCONTINUED | OUTPATIENT
Start: 2023-12-11 | End: 2023-12-11 | Stop reason: HOSPADM

## 2023-12-11 RX ORDER — ACETAMINOPHEN 325 MG/1
650 TABLET ORAL EVERY 4 HOURS PRN
Status: DISCONTINUED | OUTPATIENT
Start: 2023-12-11 | End: 2023-12-11 | Stop reason: HOSPADM

## 2023-12-11 RX ORDER — FENTANYL CITRATE 50 UG/ML
INJECTION, SOLUTION INTRAMUSCULAR; INTRAVENOUS AS NEEDED
Status: DISCONTINUED | OUTPATIENT
Start: 2023-12-11 | End: 2023-12-11

## 2023-12-11 RX ORDER — MIDAZOLAM HYDROCHLORIDE 1 MG/ML
INJECTION INTRAMUSCULAR; INTRAVENOUS AS NEEDED
Status: DISCONTINUED | OUTPATIENT
Start: 2023-12-11 | End: 2023-12-11

## 2023-12-11 RX ORDER — ACETAMINOPHEN 160 MG/5ML
500 LIQUID ORAL EVERY 6 HOURS PRN
Status: CANCELLED | OUTPATIENT
Start: 2023-12-11

## 2023-12-11 RX ORDER — MEPERIDINE HYDROCHLORIDE 50 MG/ML
12.5 INJECTION INTRAMUSCULAR; INTRAVENOUS; SUBCUTANEOUS EVERY 10 MIN PRN
Status: DISCONTINUED | OUTPATIENT
Start: 2023-12-11 | End: 2023-12-11 | Stop reason: HOSPADM

## 2023-12-11 RX ORDER — LACTULOSE 10 G/15ML
10 SOLUTION ORAL 2 TIMES DAILY
Status: CANCELLED | OUTPATIENT
Start: 2023-12-11

## 2023-12-11 RX ORDER — DULOXETIN HYDROCHLORIDE 60 MG/1
60 CAPSULE, DELAYED RELEASE ORAL NIGHTLY
Status: CANCELLED | OUTPATIENT
Start: 2023-12-11

## 2023-12-11 RX ADMIN — FENTANYL CITRATE 50 MCG: 50 INJECTION, SOLUTION INTRAMUSCULAR; INTRAVENOUS at 07:43

## 2023-12-11 RX ADMIN — SODIUM CHLORIDE, SODIUM LACTATE, POTASSIUM CHLORIDE, AND CALCIUM CHLORIDE: 600; 310; 30; 20 INJECTION, SOLUTION INTRAVENOUS at 08:59

## 2023-12-11 RX ADMIN — HYDROMORPHONE HYDROCHLORIDE 0.5 MG: 1 INJECTION, SOLUTION INTRAMUSCULAR; INTRAVENOUS; SUBCUTANEOUS at 10:11

## 2023-12-11 RX ADMIN — MIDAZOLAM HYDROCHLORIDE 1 MG: 1 INJECTION, SOLUTION INTRAMUSCULAR; INTRAVENOUS at 08:05

## 2023-12-11 RX ADMIN — MIDAZOLAM HYDROCHLORIDE 2 MG: 1 INJECTION, SOLUTION INTRAMUSCULAR; INTRAVENOUS at 07:37

## 2023-12-11 RX ADMIN — FENTANYL CITRATE 50 MCG: 50 INJECTION, SOLUTION INTRAMUSCULAR; INTRAVENOUS at 08:02

## 2023-12-11 RX ADMIN — MIDAZOLAM HYDROCHLORIDE 1 MG: 1 INJECTION, SOLUTION INTRAMUSCULAR; INTRAVENOUS at 09:21

## 2023-12-11 RX ADMIN — SODIUM CHLORIDE, SODIUM LACTATE, POTASSIUM CHLORIDE, AND CALCIUM CHLORIDE: 600; 310; 30; 20 INJECTION, SOLUTION INTRAVENOUS at 07:30

## 2023-12-11 ASSESSMENT — COLUMBIA-SUICIDE SEVERITY RATING SCALE - C-SSRS
1. IN THE PAST MONTH, HAVE YOU WISHED YOU WERE DEAD OR WISHED YOU COULD GO TO SLEEP AND NOT WAKE UP?: NO
2. HAVE YOU ACTUALLY HAD ANY THOUGHTS OF KILLING YOURSELF?: NO
6. HAVE YOU EVER DONE ANYTHING, STARTED TO DO ANYTHING, OR PREPARED TO DO ANYTHING TO END YOUR LIFE?: NO

## 2023-12-11 ASSESSMENT — PAIN SCALES - GENERAL
PAINLEVEL_OUTOF10: 0 - NO PAIN
PAINLEVEL_OUTOF10: 8

## 2023-12-11 ASSESSMENT — PAIN - FUNCTIONAL ASSESSMENT
PAIN_FUNCTIONAL_ASSESSMENT: 0-10

## 2023-12-11 ASSESSMENT — PAIN DESCRIPTION - DESCRIPTORS: DESCRIPTORS: SPASM

## 2023-12-11 NOTE — ANESTHESIA POSTPROCEDURE EVALUATION
Patient: Armida Adkins    Procedure Summary       Date: 12/11/23 Room / Location: [facility] OR 27 / Virtual [facility] OR    Anesthesia Start: 0730 Anesthesia Stop: 1003    Procedure: ULTRASOUND RIGHT JUGULAR VEIN ACCESS, LEFT RENAL VEIN, BILATERAL ILIOFEMORAL VENOGRAPHY, AND IVUS LEFT GONADAL AND PELVIC VENOGRAM. (Neck) Diagnosis:       Pelvic pain      Painful veins      (Pelvic pain [R10.2])      (Painful veins [R09.89, R52])    Surgeons: Marianna Ordaz MD Responsible Provider: Eliazar Sevilla MD    Anesthesia Type: MAC ASA Status: 2            Anesthesia Type: MAC    Vitals Value Taken Time   /69 12/11/23 1045   Temp 36.2 °C (97.2 °F) 12/11/23 1001   Pulse 78 12/11/23 1050   Resp 12 12/11/23 1050   SpO2 98 % 12/11/23 1050   Vitals shown include unvalidated device data.    Anesthesia Post Evaluation    Patient location during evaluation: PACU  Patient participation: complete - patient cannot participate  Level of consciousness: awake  Pain management: adequate  Airway patency: patent  Cardiovascular status: acceptable  Respiratory status: acceptable  Hydration status: acceptable  Postoperative Nausea and Vomiting: none        There were no known notable events for this encounter.

## 2023-12-11 NOTE — BRIEF OP NOTE
Date: 2023  OR Location: [facility] [place]    Name: Armida Adkins, : 1990, Age: 33 y.o., MRN: [id number], Sex: female    Diagnosis  Pre-op Diagnosis     * Pelvic pain [R10.2]     * Painful veins [R09.89, R52] Post-op Diagnosis     * Pelvic pain [R10.2]     * Painful veins [R09.89, R52]     Procedures  ULTRASOUND RIGHT JUGULAR VEIN ACCESS, LEFT RENAL VEIN, BILATERAL ILIOFEMORAL VENOGRAPHY, AND IVUS LEFT GONADAL AND PELVIC VENOGRAM.  62728 - CHG VENOGRAPHY CAVAL INFERIOR SERIALOGRAPHY RS&I      Surgeons      * Marianna Ordaz - Primary    Resident/Fellow/Other Assistant:  Surgeon(s) and Role:     * Macario Poole MD - Resident - Assisting    Procedure Summary  Anesthesia: Monitor Anesthesia Care  ASA: II  Anesthesia Staff: Anesthesiologist: Eliazar Sevilla MD  C-AA: TRA Lucero  Estimated Blood Loss: 5mL  Intra-op Medications:   Medication Name Total Dose   bupivacaine PF (Marcaine) 0.25 % (2.5 mg/mL) 30 mL, lidocaine (Xylocaine) 30 mL, sodium bicarbonate 3 mEq syringe 5 mL              Anesthesia Record               Intraprocedure I/O Totals          Intake    LR 1200.00 mL    Total Intake 1200 mL          Specimen: No specimens collected     Staff:   Circulator: Gail Thompson RN  Scrub Person: Neha Johnson RN; Kandice Mazariegos    Findings:   Pelvic venous hypertension with significant varicosities in both the anterior (via anterior hypogastric vein) and posterior (via reflux from the gonadal venous system) pelvic distribution. Effacement of the left common iliac vein but without compression at the cross-over of the right common iliac artery.    Complications:  None; patient tolerated the procedure well.     Disposition: PACU - hemodynamically stable.  Condition: stable  Specimens Collected: No specimens collected  Attending Attestation: I was present and scrubbed for the entire procedure.    Macario Poole MD

## 2023-12-11 NOTE — ANESTHESIA PREPROCEDURE EVALUATION
Patient: Armida Adkins    Procedure Information       Date/Time: 12/11/23 0715    Procedure: Venogram via Lower Extremity Access    Location: [facility] OR 27 / [facility] OR    Surgeons: Marianna Ordaz MD            Relevant Problems   Anesthesia   (+) History of general anesthesia      Cardiovascular   (+) AVNRT (AV kat re-entry tachycardia)   (+) Atrial flutter (CMS/HCC)      GI   (+) GERD (gastroesophageal reflux disease)   (+) Irritable bowel syndrome with constipation      Neuro/Psych   (+) Carpal tunnel syndrome, bilateral upper limbs   (+) Carpal tunnel syndrome, left   (+) Carpal tunnel syndrome, right   (+) Depression      Pulmonary   (+) Asthma, intermittent      Musculoskeletal   (+) Carpal tunnel syndrome, bilateral upper limbs   (+) Carpal tunnel syndrome, left   (+) Carpal tunnel syndrome, right   (+) Scoliosis      Eyes, Ears, Nose, and Throat   (+) Bilateral sensorineural hearing loss       Clinical information reviewed:   Tobacco  Allergies  Meds   Med Hx  Surg Hx  OB Status  Fam Hx  Soc   Hx        NPO Detail:  NPO/Void Status  Date of Last Liquid: 12/11/23  Time of Last Liquid: 0000  Date of Last Solid: 12/11/23  Time of Last Solid: 0000         PHYSICAL EXAM    Anesthesia Plan    ASA 2     MAC     intravenous induction   Postoperative administration of opioids is intended.  Anesthetic plan and risks discussed with patient.  Use of blood products discussed with patient who.

## 2023-12-11 NOTE — ANESTHESIA PROCEDURE NOTES
Peripheral IV  Date/Time: 12/11/2023 7:05 AM  Inserted by: TRA Lucero    Placement  Needle size: 20 G  Laterality: left  Location: hand  Local anesthetic: none  Site prep: chlorhexidine  Attempts: 1

## 2023-12-11 NOTE — OP NOTE
ULTRASOUND RIGHT JUGULAR VEIN ACCESS, LEFT RENAL VEIN, BILATERAL ILIOFEMORAL VENOGRAPHY, AND IVUS LEFT GONADAL AND PELVIC VENOGRAM. Operative Note     Date: 2023  OR Location: [facility] [place] OR    Name: Armida Adkins, : 1990, Age: 33 y.o., MRN: [id number], Sex: female    Diagnosis  Pre-op Diagnosis     * Pelvic pain [R10.2]     * Painful veins [R09.89, R52] Post-op Diagnosis     * Pelvic pain [R10.2]     * Painful veins [R09.89, R52]     Procedures  ULTRASOUND RIGHT JUGULAR VEIN ACCESS, LEFT RENAL VEIN, BILATERAL ILIOFEMORAL VENOGRAPHY, AND IVUS LEFT GONADAL AND PELVIC VENOGRAM.  [id number] - CHG VENOGRAPHY CAVAL INFERIOR SERIALOGRAPHY RS&I  Left renal vein IVUS and venogram    Surgeons      * Marianna Ordaz - Primary    Resident/Fellow/Other Assistant:  Surgeon(s) and Role:     * Macario Poole MD - Resident - Assisting    Procedure Summary  Anesthesia: Monitor Anesthesia Care  ASA: II  Anesthesia Staff: Anesthesiologist: Elizaar Sevilla MD  C-AA: TRA Lucero  Estimated Blood Loss: 5mL  Intra-op Medications:   Medication Name Total Dose   bupivacaine PF (Marcaine) 0.25 % (2.5 mg/mL) 30 mL, lidocaine (Xylocaine) 30 mL, sodium bicarbonate 3 mEq syringe 5 mL              Anesthesia Record               Intraprocedure I/O Totals          Intake    LR 1200.00 mL    Total Intake 1200 mL          Specimen: No specimens collected     Staff:   Circulator: Gail Thompson RN  Scrub Person: Neha Johnson RN; Kandice Mazariegos         Drains and/or Catheters: * None in log *    Tourniquet Times:       Implants:     Findings: see op note    Indications: Armida Adkins is an 33 y.o. female who is having surgery for Pelvic pain [R10.2]  Painful veins [R09.89, R52]. She has had multiple evaluations for other potential contributors to her chronic pelvic pain and post-coital pain. We discussed proceeding with a diagnostic study and then based on this evaluation, we can discuss potential therapies.     The patient was  seen in the preoperative area. The risks, benefits, complications, treatment options, non-operative alternatives, expected recovery and outcomes were discussed with the patient. The possibilities of reaction to medication, pulmonary aspiration, injury to surrounding structures, bleeding, recurrent infection, the need for additional procedures, failure to diagnose a condition, and creating a complication requiring transfusion or operation were discussed with the patient. The patient concurred with the proposed plan, giving informed consent.  The site of surgery was properly noted/marked if necessary per policy. The patient has been actively warmed in preoperative area. Preoperative antibiotics are not indicated. Venous thrombosis prophylaxis have been ordered including bilateral sequential compression devices    Procedure Details: PROCEDURE DETAILS: The patient was brought to the operating room, was placed in the operating table in supine position with bilateral arms tucked.  No antibiotics were given as none were indicated.  This was a diagnostic venography. We did timeout and a huddle based on name, date of birth and medical record number.      DIAGNOSTIC STUDY: Subcutaneous local anesthetic was administered at our access site which was the right internal jugular vein.  We then used ultrasound to cannulate the vein under direct visualization and advanced our wire under fluoroscopy.  We made a small nick incision and then remove the dilator and wire after replacing with a micropuncture sheath.  Then a BEW Globalson wire was advanced down to the IVC under fluoroscopy guidance allowing us to upsized to a 5 Liberian 10 cm sheath.  We then heparin flush the sheath and removed the dilator and then advanced the Berenstein catheter down, bringing our wire down to the right femoral vein.    From there we did venography in ascending fashion with normal respirophasic flow, without any breath-holds.  The flow appeared to go in an  "antegrade fashion without much reflux into the pelvis and no areas of luminal irregularities noted along the course towards the IVC.  We then pulled back out catheter advanced a Glidewire to cannulate the left common iliac vein around the L4-5 level and followed this with the catheter down to the same left femoral vein.  Wire was removed and ascending venography was performed on this side with some reflux into the hypogastric on the left as well as a \"pancaking\" effect of the left common iliac vein at the May-Thurner location.  We advanced our wire back down to the femoral vein and then upsized our sheath to an 8 Polish sheath 25 cm long, to proceed with intravascular ultrasound.    Over a Bentson wire we did intravascular ultrasound on the left side from the femoral vein to the IVC noting the diameter and caliber change(s) from the external iliac vein common iliac vein and May-Thurner location with a significant stenosis greater than 60% noted at the common iliac vein compression. I assured good hydration and respiratory maneuvers were done to evaluate this fully.  We then cannulated the hypogastric vein with the Berenstein catheter after removing the IVUS for selective venography noting some dilation of the hypogastric confluence of the anterior and the posterior branches and some cross pelvic flow into the right side. We cannulated the anterior and the posterior hypogastric branches and it appeared that the anterior branch led to an impressive cluster of varicose veins towards the based of the pelvis and crossing over to the right side by way of collaterals that relate to her vaginal area and her pain.     We also cannulated the right hypogastric vein for venography with less prominence of the hypogastric confluence of the anterior and the posterior branch and less cross pelvic filling. No varicosities filled form this side.     We then proceeded to cannulate the left renal vein with the cobra C2 catheter and a " glide wire down to the left gonadal vein. We performed venography at multiple levels measuring the gonadal vein at 6-8mm or greater diameter. Associated with pelvic varicosities with continuous contrast hang up at >30 seconds. IVUS was performed over a bentson wire of the left renal vein as well as venography.       COMPLETION: We removed the wires, pulled the sheaths and held pressure for about 10 minutes with good hemostasis and our procedure was completed and a dressing was applied.  I was present for the entire case.  All counts were correct.  There were no complications.   Complications:  None; patient tolerated the procedure well.    Disposition: PACU - hemodynamically stable.  Condition: stable       Additional Details: 62cc contrast  Has multiple varicosities associated with the anterior branch of the hypogastric vein and the left refluxing gonadal vein. Also has secondarily a left common iliac vein compression.      Attending Attestation: I was present and scrubbed for the entire procedure.    Marianna Ordaz  Phone Number: [phone]

## 2023-12-12 RX ORDER — MOMETASONE FUROATE 50 UG/1
2 SPRAY, METERED NASAL 2 TIMES DAILY
Qty: 17 G | Refills: 2 | Status: SHIPPED | OUTPATIENT
Start: 2023-12-12 | End: 2024-12-11

## 2023-12-12 RX ORDER — DULOXETIN HYDROCHLORIDE 60 MG/1
CAPSULE, DELAYED RELEASE ORAL
Qty: 90 CAPSULE | Refills: 3 | Status: SHIPPED | OUTPATIENT
Start: 2023-12-12 | End: 2024-02-19 | Stop reason: HOSPADM

## 2023-12-12 RX ORDER — CYCLOBENZAPRINE HCL 5 MG
TABLET ORAL
Qty: 180 TABLET | Refills: 1 | Status: SHIPPED | OUTPATIENT
Start: 2023-12-12 | End: 2024-02-27 | Stop reason: ALTCHOICE

## 2023-12-12 ASSESSMENT — PAIN SCALES - GENERAL: PAINLEVEL_OUTOF10: 5 - MODERATE PAIN

## 2023-12-19 ENCOUNTER — APPOINTMENT (OUTPATIENT)
Dept: CARDIOLOGY | Facility: CLINIC | Age: 33
End: 2023-12-19
Payer: COMMERCIAL

## 2024-01-03 ENCOUNTER — APPOINTMENT (OUTPATIENT)
Dept: PRIMARY CARE | Facility: CLINIC | Age: 34
End: 2024-01-03
Payer: COMMERCIAL

## 2024-01-08 DIAGNOSIS — N95.2 POSTMENOPAUSAL ATROPHIC VAGINITIS: ICD-10-CM

## 2024-01-08 PROBLEM — G43.909 MIGRAINE HEADACHE: Status: ACTIVE | Noted: 2024-01-08

## 2024-01-08 RX ORDER — ESTRADIOL 0.1 MG/G
CREAM VAGINAL
COMMUNITY
Start: 2023-12-11

## 2024-01-08 RX ORDER — DEXTROMETHORPHAN HYDROBROMIDE, GUAIFENESIN 5; 100 MG/5ML; MG/5ML
650 LIQUID ORAL 2 TIMES DAILY
COMMUNITY
Start: 2023-03-23 | End: 2024-05-08

## 2024-01-08 RX ORDER — ESTRADIOL 0.1 MG/G
CREAM VAGINAL
Qty: 42.5 G | Refills: 1 | Status: SHIPPED | OUTPATIENT
Start: 2024-01-08 | End: 2024-01-11 | Stop reason: WASHOUT

## 2024-01-09 ENCOUNTER — APPOINTMENT (OUTPATIENT)
Dept: CARDIOLOGY | Facility: CLINIC | Age: 34
End: 2024-01-09
Payer: COMMERCIAL

## 2024-01-11 ENCOUNTER — OFFICE VISIT (OUTPATIENT)
Dept: OBSTETRICS AND GYNECOLOGY | Facility: CLINIC | Age: 34
End: 2024-01-11
Payer: COMMERCIAL

## 2024-01-11 VITALS
HEIGHT: 64 IN | BODY MASS INDEX: 23.05 KG/M2 | DIASTOLIC BLOOD PRESSURE: 71 MMHG | WEIGHT: 135 LBS | HEART RATE: 80 BPM | SYSTOLIC BLOOD PRESSURE: 109 MMHG

## 2024-01-11 DIAGNOSIS — R10.2 PELVIC PAIN: Primary | ICD-10-CM

## 2024-01-11 PROCEDURE — 1036F TOBACCO NON-USER: CPT | Performed by: STUDENT IN AN ORGANIZED HEALTH CARE EDUCATION/TRAINING PROGRAM

## 2024-01-11 ASSESSMENT — ENCOUNTER SYMPTOMS
HEMATOLOGIC/LYMPHATIC NEGATIVE: 0
CONSTITUTIONAL NEGATIVE: 0
PSYCHIATRIC NEGATIVE: 0
RESPIRATORY NEGATIVE: 0
GASTROINTESTINAL NEGATIVE: 0
MUSCULOSKELETAL NEGATIVE: 0
CARDIOVASCULAR NEGATIVE: 0
EYES NEGATIVE: 0
ENDOCRINE NEGATIVE: 0
NEUROLOGICAL NEGATIVE: 0
ALLERGIC/IMMUNOLOGIC NEGATIVE: 0

## 2024-01-11 ASSESSMENT — PAIN SCALES - GENERAL: PAINLEVEL: 0-NO PAIN

## 2024-01-11 NOTE — PROGRESS NOTES
Trigger Point Injection    Procedure and indications reviewed with patient prior to start of procedure.     Patient was placed in lithotomy position in stirrups. Pelvic floor muscle exam was performed demonstrating tenderness and spasm of all pelvic floor musculatur. Viscous lidocaine was placed in the vaginal vault and allowed to sit for approximately 5 minutes.  The vaginal walls were then cleansed with iodine solution. A total of 10 cc of 1% lidacine was then injected in bilateral LA muscles.  Post procedure observation for bleeding showed excellent hemostasis. Patient tolerated the procedure well.      Discussed with patient that these injections can be repeated every 2-4 weeks as long as she is noting improvement in her pain with injections.  If she requires more sessions, will consider moving on to Botox injections to the pelvic floor muscles.

## 2024-01-18 NOTE — PROGRESS NOTES
Otolaryngology - Head and Neck Surgery Outpatient New Patient Visit Note    Chief Concern:  Follow up CT    History Of Present Illness  Fabián Adkins is a 33 y.o. female with a history of  recurrent sinusitis, presenting today for review of sinus CT and symptoms.    Reports ongoing mild sinus pressure and congestion.  Minimal/no effect from augmentin    Decrease in epistaxis frequency/severity.  .       Recall     33yoF presents for evaluation of chronic cough, intermittent epistaxis and nasal congestion/obstruction.       reports a history of frequent URIs, and notes recurrent acute sinusitis 3-4x per year.    most recently treated with antibiotics 2-3mo ago for sinusitis. reports some ongoing midface fullness/pressure and PND causing sore throat and hoarseness.   denies significant prior nasal trauma/surgery  reports baseline R>L congestion.     reports a history of recurrent epistaxis up to 3x per month as well as more frequent taste of blood or metallic taste in throat.     reports mild seasonal allergic rhinitis without consistent management.     denies significant GERD history.       Past Medical History  She has a past medical history of Acute sinusitis, unspecified (02/01/2016), Encounter for initial prescription of contraceptive pills (08/10/2018), Encounter for removal of intrauterine contraceptive device (03/01/2016), Encounter for screening for malignant neoplasm of cervix (11/24/2015), Myopia, bilateral (12/01/2014), Other conditions influencing health status, Other specified health status (05/23/2017), Other specified symptoms and signs involving the digestive system and abdomen (07/09/2015), Pain in right shoulder (10/07/2020), Pelvic and perineal pain (05/28/2021), Personal history of other diseases of the digestive system (07/30/2015), Personal history of other diseases of the female genital tract (08/10/2018), Personal history of other diseases of the female genital tract (08/10/2018), Personal  Patient has no medication left.    Preferred pharmacy: [facility]   history of other diseases of the female genital tract (09/11/2015), Personal history of other diseases of the musculoskeletal system and connective tissue (10/13/2022), Personal history of other diseases of the respiratory system, Personal history of other specified conditions, and Unspecified astigmatism, bilateral (12/01/2014).    Surgical History  She has a past surgical history that includes Other surgical history (08/18/2020); Other surgical history (07/31/2015); IR angiogram inferior epigastric pelvic (2/9/2023); IR angiogram inferior epigastric pelvic (2/9/2023); and IR angiogram inferior epigastric (2/9/2023).     Social History  She reports that she has never smoked. She has never used smokeless tobacco. No history on file for alcohol use and drug use.    Family History  Family History   Problem Relation Name Age of Onset    Uterine cancer Mother      Seizures Mother      Other (cerebrovascular accident) Father      Uterine cancer Sister      Colon cancer Mother's Brother          Allergies  Benzonatate, Erythromycin-benzoyl peroxide, Fruit flavor, Hydrocortisone, Pollen extracts, and Sulfamethoxazole-trimethoprim    Review of Systems  ROS: Pertinent positives as noted in HPI.    - CONSTITUTIONAL: Does not report weight loss, fever or chills.    - HEENT:   Ear: Does not report tinnitus, vertigo, hearing loss, otalgia, otorrhea  Nose: Does not report  ,  ,  , decreased smell  Throat: Does not report pain, dysphagia, odynophagia  Larynx: Does not report hoarseness,  difficulty breathing, pain with speaking (odynophonia)  Neck: Does not report new masses, pain, swelling  Face: Does not report    ,  , swelling, numbness, weakness     - RESPIRATORY: Does not report SOB or cough.    - CV: Does not report palpitations or chest pain.     - GI: Does not report abdominal pain, nausea, vomiting or diarrhea.    - : Does not report dysuria or urinary frequency.    - MSK: Does not report myalgia or joint pain.    -  SKIN: Does not report rash or pruritus.    - NEUROLOGICAL: Does not report headache or syncope.    - PSYCHIATRIC: Does not report recent changes in mood. Does not report anxiety or depression.         Physical Exam:     GENERAL:   Alert & Oriented to person, place and time; Normal affect and appearance. Well developed and well nourished. Conversant & cooperative with examination.     HEAD:   Normocephalic, atraumatic. No sinus tenderness to palpation. Normal parotid bilaterally. Normal facial strength.     NEUROLOGIC:   Cranial nerves II-XII grossly intact, gait WNL. Normal mood and affect.    EYES:   Extraocular movements intact. Pupils equal, round, reactive to light and accommodation. No nystagmus, no ptosis. no scleral injection.    EAR:   Normal auricle. No discomfort or TTP with manipulation.   Handheld otoscopic exam showed normal external auditory canals bilaterally. No purulence or EAC inflammation. Minimal cerumen.   Right tympanic membrane clear and mobile without evidence of perforation, retraction or middle ear effusion.   Left tympanic membrane clear and mobile without evidence of perforation, retraction or middle ear effusion.     NOSE:   No external deformity. No external nasal lesions, lacerations, or scars. Nasal tip symmetrical with normal nasal valves.   Nasal cavity with essentially midline septum, dry/inflamed septal  mucosa and turbinates. No lesions, masses, purulence or polyps.     OC/OP:   Mucous membranes moist, no masses, lesions or exudates.   Normal tongue, floor of mouth, teeth, gums, lips. Normal posterior pharyngeal wall.    Normal tonsils without erythema, exudate or obvious calculi     NECK:   No neck masses or thyroid enlargement. Trachea midline. No tenderness to palpation    LYMPHATIC:   No cervical lymphadenopathy.     RESPIRATORY:   Symmetric chest elevation & no retractions. No significant hoarseness. No increased work of breathing.    CV:   No clubbing or cyanosis. No  obvious edema    Skin:   No facial rashes, vesicles or lesions.     Extremities:   No gross abnormalities      Clinic Procedure        Information review:  External sources (notes, imaging, lab results) listed below personally reviewed to aid in medical decision making process.  -  -  -    CT sinus shows no significant sinusitis.        Assessment/Plan   Problem List Items Addressed This Visit             ICD-10-CM       ENT    Frequent nosebleeds R04.0    Acute sinusitis - Primary J01.90     Other Visit Diagnoses         Codes    Chronic rhinitis     J31.0    Relevant Medications    mometasone (Nasonex) 50 mcg/actuation nasal spray            Will have pt use nasonex for rhinitis and use nasal saline gel to contiue to control for mild rhinitis.  No ongoing sinusitis on imaging.  Pt to contact clinic for acute sinusitis to arrange imaging/eval at that time.       Follow up:  -plan for follow up in clinic PRN    All of the above findings, impressions, treatment planning and follow up plans were discussed with the patient who indicated understanding.  the patient was instructed to contact or return to clinic sooner if symptoms/signs persist or worsen despite the above management.      Bakari Mae MD  Otolaryngology - Head and Neck Surgery

## 2024-01-19 ENCOUNTER — PREP FOR PROCEDURE (OUTPATIENT)
Dept: VASCULAR SURGERY | Facility: HOSPITAL | Age: 34
End: 2024-01-19
Payer: COMMERCIAL

## 2024-01-19 DIAGNOSIS — R10.2 PELVIC PAIN: Primary | ICD-10-CM

## 2024-01-25 ENCOUNTER — TELEMEDICINE (OUTPATIENT)
Dept: VASCULAR SURGERY | Facility: CLINIC | Age: 34
End: 2024-01-25
Payer: MEDICARE

## 2024-01-25 DIAGNOSIS — R10.2 PELVIC PAIN: ICD-10-CM

## 2024-01-25 DIAGNOSIS — R10.30 LOWER ABDOMINAL PAIN: Primary | ICD-10-CM

## 2024-01-25 PROBLEM — Z86.2 HISTORY OF ANEMIA: Status: ACTIVE | Noted: 2024-01-25

## 2024-01-25 PROBLEM — N92.1 METRORRHAGIA: Status: ACTIVE | Noted: 2024-01-25

## 2024-01-25 PROBLEM — M79.89 SWELLING OF LOWER LEG: Status: ACTIVE | Noted: 2024-01-25

## 2024-01-25 PROBLEM — M25.539 PAIN IN WRIST: Status: ACTIVE | Noted: 2024-01-25

## 2024-01-25 PROBLEM — Z98.890 POSTOPERATIVE STATE: Status: ACTIVE | Noted: 2024-01-25

## 2024-01-25 PROBLEM — M79.9 DISORDER OF SOFT TISSUE: Status: ACTIVE | Noted: 2024-01-25

## 2024-01-25 PROBLEM — N94.819 VULVODYNIA: Status: ACTIVE | Noted: 2024-01-25

## 2024-01-25 PROBLEM — R19.8 DIFFICULTY SWALLOWING PILLS: Status: ACTIVE | Noted: 2024-01-25

## 2024-01-25 PROBLEM — R00.0 TACHYCARDIA: Status: ACTIVE | Noted: 2023-09-06

## 2024-01-25 PROBLEM — R52 PAIN, UNSPECIFIED: Status: ACTIVE | Noted: 2023-12-01

## 2024-01-25 PROBLEM — R05.9 COUGH, UNSPECIFIED: Status: ACTIVE | Noted: 2022-10-08

## 2024-01-25 PROBLEM — I47.10 SUPRAVENTRICULAR TACHYCARDIA (CMS-HCC): Status: ACTIVE | Noted: 2023-02-10

## 2024-01-25 PROBLEM — G89.18 POSTOPERATIVE PAIN: Status: ACTIVE | Noted: 2024-01-25

## 2024-01-25 PROBLEM — N80.9 ENDOMETRIOSIS: Status: ACTIVE | Noted: 2023-03-23

## 2024-01-25 PROBLEM — Z97.5 PRESENCE OF INTRAUTERINE CONTRACEPTIVE DEVICE: Status: ACTIVE | Noted: 2024-01-25

## 2024-01-25 PROBLEM — Z20.822 CONTACT WITH AND (SUSPECTED) EXPOSURE TO COVID-19: Status: ACTIVE | Noted: 2023-03-23

## 2024-01-25 PROBLEM — N93.9 ABNORMAL VAGINAL BLEEDING: Status: ACTIVE | Noted: 2024-01-25

## 2024-01-25 ASSESSMENT — PATIENT HEALTH QUESTIONNAIRE - PHQ9
SUM OF ALL RESPONSES TO PHQ9 QUESTIONS 1 AND 2: 0
2. FEELING DOWN, DEPRESSED OR HOPELESS: NOT AT ALL
1. LITTLE INTEREST OR PLEASURE IN DOING THINGS: NOT AT ALL

## 2024-01-25 NOTE — PATIENT INSTRUCTIONS
It was a pleasure taking care of you today and appreciate your seeing us at our [facility] [place] [facility]    Based on your leg symptoms, venous insufficiency studies and potential for clinical improvement, I am recommeding the followin) I would proceed with gonadal vein embolization and sclerotherapy of your pelvic vv - we will tentatively schedule this on  at [facility]       Please call the office with any questions at [phone].   For Vein Center specific questions, particularly insurance related questions of booking your [place] intervention, you can call [phone] or email at [email or web address]    You can speak directly to my Vein Center Nurse Coordinator, Cindy Ramos, for specific questions.       Marianna Ordaz MD, MHS, RPVI  , [facility] [employer]  Director, [facility], [facility] [place]  Co-Director, Vascular Laboratories, [facility] [place]  Division of Vascular Surgery and Endovascular Therapy  [facility]

## 2024-01-26 NOTE — H&P (VIEW-ONLY)
F/U REASON: pelvic venous congestion    CURRENT ENCOUNTER:  Armida Adkins is 33 y.o. female here for follow up of  pelvic venous congestion.    Patient wanted to follow-up after recent diagnostic venogram.  We discussed her tests and the study and my recommendation for gonadal embolization and pelvic embolization with concurrent sclerotherapy to address the bulky varicosities causing pelvic chronic venous hypertension.  Patient states that she had communication with her insurance and was told she was approved for vascular embolization.    Meds:   Current Outpatient Medications:     acetaminophen (Tylenol 8 HOUR) 650 mg ER tablet, Take 1 tablet (650 mg) by mouth 2 times a day., Disp: , Rfl:     albuterol 2.5 mg /3 mL (0.083 %) nebulizer solution, Inhale 3 mL (2.5 mg) every 4 hours if needed for wheezing., Disp: , Rfl:     albuterol 90 mcg/actuation inhaler, Inhale 1-2 puffs every 4 hours if needed for shortness of breath., Disp: 18 g, Rfl: 2    cyclobenzaprine (Flexeril) 5 mg tablet, TAKE 1 OR 2 TABLETS BY MOUTH EVERY 8 HOURS AS NEEDED FOR MUSCLE PAIN/SPASMS, Disp: 180 tablet, Rfl: 1    DULoxetine (Cymbalta) 60 mg DR capsule, TAKE 1 CAPSULE EVERY EVENING (AFTER 1 WEEK OF 30MG CAPSULES), Disp: 90 capsule, Rfl: 3    estradiol (Estrace) 0.01 % (0.1 mg/gram) vaginal cream, PLACE A PEA SIZED AMOUNT ON YOUR FINGER AND APPLY TO SORE AREA OF VULVA. USE NIGHTLY FOR ONE MONTH., Disp: , Rfl:     etonogestreL-ethinyl estradioL (Nuvaring) 0.12-0.015 mg/24 hr vaginal ring, Insert into the vagina., Disp: , Rfl:     hydrocortisone 2.5 % cream, Apply twice daily to affected areas, Disp: , Rfl:     ibuprofen 600 mg tablet, Take 1 tablet (600 mg) by mouth every 8 hours if needed for mild pain (1 - 3)., Disp: , Rfl:     mometasone (Nasonex) 50 mcg/actuation nasal spray, ADMINISTER 2 SPRAYS INTO EACH NOSTRIL 2 TIMES A DAY., Disp: 17 g, Rfl: 2    Allergies:   Allergies   Allergen Reactions    Benzonatate Unknown    Coconut Itching     Erythromycin-Benzoyl Peroxide Unknown    Fruit Flavor Unknown    Hydrocortisone Unknown    Peach Itching    Pineapple Itching    Pollen Extracts Unknown    Sulfamethoxazole-Trimethoprim Swelling       ROS:  Review of Systems    otherwise unremarkable    Labs:  Lab Results   Component Value Date    WBC 8.0 11/30/2023    WBC 10.3 09/10/2023    WBC 13.9 (H) 09/06/2023    HGB 12.9 11/30/2023    HGB 11.0 (L) 09/10/2023    HGB 11.2 (L) 09/06/2023    HCT 38.9 11/30/2023    HCT 33.6 (L) 09/10/2023    HCT 32.7 (L) 09/06/2023    MCV 83 11/30/2023    MCV 85 09/10/2023    MCV 81 09/06/2023     11/30/2023     Lab Results   Component Value Date    CREATININE 0.83 11/30/2023    CREATININE 0.83 09/10/2023    CREATININE 0.78 09/06/2023    BUN 8 11/30/2023    BUN 9 09/10/2023    BUN 10 09/06/2023     11/30/2023     (L) 09/10/2023     09/06/2023    K 4.0 11/30/2023    K 3.6 09/10/2023    K 3.6 09/06/2023     11/30/2023     09/10/2023     09/06/2023    CO2 27 11/30/2023    CO2 26 09/10/2023    CO2 24 09/06/2023         Assessment & Plan:  Armida Adkins is 33 y.o. female with pelvic pain, intermittent left mostly anterior flank pain.      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 Sanchez 2/9/2023. Pt states that she has been experience intermittent left flank pain and pelvic pain for approximately three years The pelvic pain leads to significant dyspareunia. Pt also experiences left abdominal pain during intercourse that is severe and bloating with abdominal pain during menses. No alleviating or aggravating pain The pain is intermittent  Describes it as sharp and severe She underwent a venogram which demonstrated a pressure differential between the left renal vein and IVC of 4 mmHg  After the procedure the pt went into afib with RVR and underwent cardioversion  Now she is on Eliquis  She is currently wearing Biopatch monitor      + left  flank pain at random times, no hematuria, + abdominal bloating     do not think her left anterior upper quadrant pain is related to nutcracker left renal vein compression physiology  scoliosis and chest wall deformity may have something to do with this - will refer.   somewhat dilated gonadal vein - pelvic pain - venous hypertension may be a partial contributor to this     1992 ULTRASOUND RIGHT JUGULAR VEIN ACCESS, LEFT RENAL VEIN, BILATERAL ILIOFEMORAL VENOGRAPHY, AND IVUS LEFT GONADAL AND PELVIC VENOGRAM.  Left renal vein IVUS and venogram    1) I would proceed with gonadal vein embolization and sclerotherapy of your pelvic vv - we will tentatively schedule this on 2/19 at [facility]     Marianna Ordaz MD, MHS, RPVI  , [facility] [employer]  Director, Center for Comprehensive Venous Care, [facility] [place]  Co-Director, Vascular Laboratories, [facility] [place]  Division of Vascular Surgery and Endovascular Therapy  [facility]

## 2024-01-26 NOTE — PROGRESS NOTES
F/U REASON: pelvic venous congestion    CURRENT ENCOUNTER:  Armida Adkins is 33 y.o. female here for follow up of  pelvic venous congestion.    Patient wanted to follow-up after recent diagnostic venogram.  We discussed her tests and the study and my recommendation for gonadal embolization and pelvic embolization with concurrent sclerotherapy to address the bulky varicosities causing pelvic chronic venous hypertension.  Patient states that she had communication with her insurance and was told she was approved for vascular embolization.    Meds:   Current Outpatient Medications:     acetaminophen (Tylenol 8 HOUR) 650 mg ER tablet, Take 1 tablet (650 mg) by mouth 2 times a day., Disp: , Rfl:     albuterol 2.5 mg /3 mL (0.083 %) nebulizer solution, Inhale 3 mL (2.5 mg) every 4 hours if needed for wheezing., Disp: , Rfl:     albuterol 90 mcg/actuation inhaler, Inhale 1-2 puffs every 4 hours if needed for shortness of breath., Disp: 18 g, Rfl: 2    cyclobenzaprine (Flexeril) 5 mg tablet, TAKE 1 OR 2 TABLETS BY MOUTH EVERY 8 HOURS AS NEEDED FOR MUSCLE PAIN/SPASMS, Disp: 180 tablet, Rfl: 1    DULoxetine (Cymbalta) 60 mg DR capsule, TAKE 1 CAPSULE EVERY EVENING (AFTER 1 WEEK OF 30MG CAPSULES), Disp: 90 capsule, Rfl: 3    estradiol (Estrace) 0.01 % (0.1 mg/gram) vaginal cream, PLACE A PEA SIZED AMOUNT ON YOUR FINGER AND APPLY TO SORE AREA OF VULVA. USE NIGHTLY FOR ONE MONTH., Disp: , Rfl:     etonogestreL-ethinyl estradioL (Nuvaring) 0.12-0.015 mg/24 hr vaginal ring, Insert into the vagina., Disp: , Rfl:     hydrocortisone 2.5 % cream, Apply twice daily to affected areas, Disp: , Rfl:     ibuprofen 600 mg tablet, Take 1 tablet (600 mg) by mouth every 8 hours if needed for mild pain (1 - 3)., Disp: , Rfl:     mometasone (Nasonex) 50 mcg/actuation nasal spray, ADMINISTER 2 SPRAYS INTO EACH NOSTRIL 2 TIMES A DAY., Disp: 17 g, Rfl: 2    Allergies:   Allergies   Allergen Reactions    Benzonatate Unknown    Coconut Itching     Erythromycin-Benzoyl Peroxide Unknown    Fruit Flavor Unknown    Hydrocortisone Unknown    Peach Itching    Pineapple Itching    Pollen Extracts Unknown    Sulfamethoxazole-Trimethoprim Swelling       ROS:  Review of Systems    otherwise unremarkable    Labs:  Lab Results   Component Value Date    WBC 8.0 11/30/2023    WBC 10.3 09/10/2023    WBC 13.9 (H) 09/06/2023    HGB 12.9 11/30/2023    HGB 11.0 (L) 09/10/2023    HGB 11.2 (L) 09/06/2023    HCT 38.9 11/30/2023    HCT 33.6 (L) 09/10/2023    HCT 32.7 (L) 09/06/2023    MCV 83 11/30/2023    MCV 85 09/10/2023    MCV 81 09/06/2023     11/30/2023     Lab Results   Component Value Date    CREATININE 0.83 11/30/2023    CREATININE 0.83 09/10/2023    CREATININE 0.78 09/06/2023    BUN 8 11/30/2023    BUN 9 09/10/2023    BUN 10 09/06/2023     11/30/2023     (L) 09/10/2023     09/06/2023    K 4.0 11/30/2023    K 3.6 09/10/2023    K 3.6 09/06/2023     11/30/2023     09/10/2023     09/06/2023    CO2 27 11/30/2023    CO2 26 09/10/2023    CO2 24 09/06/2023         Assessment & Plan:  Armida Adkins is 33 y.o. female with pelvic pain, intermittent left mostly anterior flank pain.      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 Sanchez 2/9/2023. Pt states that she has been experience intermittent left flank pain and pelvic pain for approximately three years The pelvic pain leads to significant dyspareunia. Pt also experiences left abdominal pain during intercourse that is severe and bloating with abdominal pain during menses. No alleviating or aggravating pain The pain is intermittent  Describes it as sharp and severe She underwent a venogram which demonstrated a pressure differential between the left renal vein and IVC of 4 mmHg  After the procedure the pt went into afib with RVR and underwent cardioversion  Now she is on Eliquis  She is currently wearing Biopatch monitor      + left  flank pain at random times, no hematuria, + abdominal bloating     do not think her left anterior upper quadrant pain is related to nutcracker left renal vein compression physiology  scoliosis and chest wall deformity may have something to do with this - will refer.   somewhat dilated gonadal vein - pelvic pain - venous hypertension may be a partial contributor to this     1990 ULTRASOUND RIGHT JUGULAR VEIN ACCESS, LEFT RENAL VEIN, BILATERAL ILIOFEMORAL VENOGRAPHY, AND IVUS LEFT GONADAL AND PELVIC VENOGRAM.  Left renal vein IVUS and venogram    1) I would proceed with gonadal vein embolization and sclerotherapy of your pelvic vv - we will tentatively schedule this on 2/19 at [facility]     Marianna Ordaz MD, MHS, RPVI  , [facility] [employer]  Director, Center for Comprehensive Venous Care, [facility] [place]  Co-Director, Vascular Laboratories, [facility] [place]  Division of Vascular Surgery and Endovascular Therapy  [facility]

## 2024-02-07 ENCOUNTER — OFFICE VISIT (OUTPATIENT)
Dept: GASTROENTEROLOGY | Facility: CLINIC | Age: 34
End: 2024-02-07
Payer: COMMERCIAL

## 2024-02-07 VITALS
HEART RATE: 97 BPM | TEMPERATURE: 97.4 F | WEIGHT: 134 LBS | SYSTOLIC BLOOD PRESSURE: 108 MMHG | BODY MASS INDEX: 22.88 KG/M2 | HEIGHT: 64 IN | DIASTOLIC BLOOD PRESSURE: 74 MMHG

## 2024-02-07 DIAGNOSIS — K59.00 CONSTIPATION, UNSPECIFIED CONSTIPATION TYPE: Primary | ICD-10-CM

## 2024-02-07 DIAGNOSIS — R10.2 FEMALE PELVIC PAIN: ICD-10-CM

## 2024-02-07 DIAGNOSIS — R93.5 ABNORMAL CT OF THE ABDOMEN: ICD-10-CM

## 2024-02-07 DIAGNOSIS — R10.9 LEFT FLANK PAIN: Primary | ICD-10-CM

## 2024-02-07 PROCEDURE — 1036F TOBACCO NON-USER: CPT | Performed by: NURSE PRACTITIONER

## 2024-02-07 PROCEDURE — 99214 OFFICE O/P EST MOD 30 MIN: CPT | Performed by: NURSE PRACTITIONER

## 2024-02-07 RX ORDER — POLYETHYLENE GLYCOL 3350, SODIUM SULFATE ANHYDROUS, SODIUM BICARBONATE, SODIUM CHLORIDE, POTASSIUM CHLORIDE 236; 22.74; 6.74; 5.86; 2.97 G/4L; G/4L; G/4L; G/4L; G/4L
4000 POWDER, FOR SOLUTION ORAL ONCE
Qty: 4000 ML | Refills: 0 | Status: SHIPPED | OUTPATIENT
Start: 2024-02-07 | End: 2024-02-07

## 2024-02-07 RX ORDER — LACTULOSE 10 G/15ML
10 SOLUTION ORAL 2 TIMES DAILY PRN
Qty: 1000 ML | Refills: 3 | Status: SHIPPED | OUTPATIENT
Start: 2024-02-07 | End: 2025-02-06

## 2024-02-07 NOTE — PATIENT INSTRUCTIONS
Irritable bowel syndrome with constipation- you did better on the lactulose in the past than the linzess and I will reorder this for you. You can use prunes daily as well.    Abnormal CT scan of the sigmoid colon and rectum- I would recommend a colonoscopy for evaluation of this area and to rule out a stecoral ulcer from your constipation.    I will see you back for follow up after your colonoscopy

## 2024-02-07 NOTE — PROGRESS NOTES
Subjective   Patient ID: Armida Adkins is a 33 y.o. female.    HPI    33-year-old female for follow-up of IBS-C  Was diagnosed with rapid heart rate  Had laproscopy and stent placed as well    Previously seen 1/25/2023  At that time her Linzess 72 mcg not working well for her which she did about cleanout and restarted the medication.  Labs reviewed 11/30/2023  Negative hCG  TSH 1.14  Normal CBC and CMP  9/10/2023 CT of the abdomen and pelvis showed mild diffuse wall thickening in the rectum and sigmoid colon  Stopped linzess- didn't help  Lactulose helped better  BM: once a week with out meds  Water- lots      Objective   Physical Exam  Cardiovascular:      Rate and Rhythm: Normal rate and regular rhythm.      Pulses: Normal pulses.      Heart sounds: Normal heart sounds.   Pulmonary:      Effort: Pulmonary effort is normal.   Abdominal:      General: Bowel sounds are normal.      Palpations: Abdomen is soft.         Assessment/Plan   Irritable bowel syndrome with constipation- you did better on the lactulose in the past than the linzess and I will reorder this for you. You can use prunes daily as well.    Abnormal CT scan of the sigmoid colon and rectum- I would recommend a colonoscopy for evaluation of this area and to rule out a stecoral ulcer from your constipation.    I will see you back for follow up after your colonoscopy

## 2024-02-08 ENCOUNTER — TELEPHONE (OUTPATIENT)
Dept: VASCULAR MEDICINE | Facility: HOSPITAL | Age: 34
End: 2024-02-08
Payer: COMMERCIAL

## 2024-02-08 NOTE — TELEPHONE ENCOUNTER
Spoke with patient regarding pre-op instructions for 2/19/24 procedure with Dr. Ordaz including NPO after midnight night prior to procedure; no meds to take the a.m. of surgery; need for transportation,; and need for updated bloodwork/urine sample the week prior.  Patient verbalized understanding of all instructions.  Encouraged her to call with questions/concerns in the meantime.

## 2024-02-13 ENCOUNTER — LAB (OUTPATIENT)
Dept: LAB | Facility: LAB | Age: 34
End: 2024-02-13
Payer: MEDICARE

## 2024-02-13 DIAGNOSIS — R10.2 FEMALE PELVIC PAIN: ICD-10-CM

## 2024-02-13 DIAGNOSIS — R10.9 LEFT FLANK PAIN: ICD-10-CM

## 2024-02-13 LAB
ALBUMIN SERPL BCP-MCNC: 3.9 G/DL (ref 3.4–5)
ANION GAP SERPL CALC-SCNC: 10 MMOL/L (ref 10–20)
B-HCG SERPL-ACNC: <3 MIU/ML
BASOPHILS # BLD AUTO: 0.04 X10*3/UL (ref 0–0.1)
BASOPHILS NFR BLD AUTO: 0.6 %
BUN SERPL-MCNC: 6 MG/DL (ref 6–23)
CALCIUM SERPL-MCNC: 9.5 MG/DL (ref 8.6–10.6)
CHLORIDE SERPL-SCNC: 105 MMOL/L (ref 98–107)
CO2 SERPL-SCNC: 26 MMOL/L (ref 21–32)
CREAT SERPL-MCNC: 0.74 MG/DL (ref 0.5–1.05)
EGFRCR SERPLBLD CKD-EPI 2021: >90 ML/MIN/1.73M*2
EOSINOPHIL # BLD AUTO: 0.27 X10*3/UL (ref 0–0.7)
EOSINOPHIL NFR BLD AUTO: 3.9 %
ERYTHROCYTE [DISTWIDTH] IN BLOOD BY AUTOMATED COUNT: 12.4 % (ref 11.5–14.5)
GLUCOSE SERPL-MCNC: 100 MG/DL (ref 74–99)
HCT VFR BLD AUTO: 37.8 % (ref 36–46)
HGB BLD-MCNC: 12.5 G/DL (ref 12–16)
IMM GRANULOCYTES # BLD AUTO: 0.03 X10*3/UL (ref 0–0.7)
IMM GRANULOCYTES NFR BLD AUTO: 0.4 % (ref 0–0.9)
LYMPHOCYTES # BLD AUTO: 2.15 X10*3/UL (ref 1.2–4.8)
LYMPHOCYTES NFR BLD AUTO: 30.8 %
MCH RBC QN AUTO: 27.2 PG (ref 26–34)
MCHC RBC AUTO-ENTMCNC: 33.1 G/DL (ref 32–36)
MCV RBC AUTO: 82 FL (ref 80–100)
MONOCYTES # BLD AUTO: 0.38 X10*3/UL (ref 0.1–1)
MONOCYTES NFR BLD AUTO: 5.5 %
NEUTROPHILS # BLD AUTO: 4.1 X10*3/UL (ref 1.2–7.7)
NEUTROPHILS NFR BLD AUTO: 58.8 %
NRBC BLD-RTO: 0 /100 WBCS (ref 0–0)
PHOSPHATE SERPL-MCNC: 3.1 MG/DL (ref 2.5–4.9)
PLATELET # BLD AUTO: 280 X10*3/UL (ref 150–450)
POTASSIUM SERPL-SCNC: 3.9 MMOL/L (ref 3.5–5.3)
RBC # BLD AUTO: 4.6 X10*6/UL (ref 4–5.2)
SODIUM SERPL-SCNC: 137 MMOL/L (ref 136–145)
WBC # BLD AUTO: 7 X10*3/UL (ref 4.4–11.3)

## 2024-02-13 PROCEDURE — 84702 CHORIONIC GONADOTROPIN TEST: CPT

## 2024-02-13 PROCEDURE — 36415 COLL VENOUS BLD VENIPUNCTURE: CPT

## 2024-02-13 PROCEDURE — 80069 RENAL FUNCTION PANEL: CPT

## 2024-02-13 PROCEDURE — 85025 COMPLETE CBC W/AUTO DIFF WBC: CPT

## 2024-02-17 ENCOUNTER — ANESTHESIA EVENT (OUTPATIENT)
Dept: OPERATING ROOM | Facility: HOSPITAL | Age: 34
End: 2024-02-17
Payer: COMMERCIAL

## 2024-02-19 ENCOUNTER — APPOINTMENT (OUTPATIENT)
Dept: RADIOLOGY | Facility: HOSPITAL | Age: 34
End: 2024-02-19
Payer: COMMERCIAL

## 2024-02-19 ENCOUNTER — ANESTHESIA (OUTPATIENT)
Dept: OPERATING ROOM | Facility: HOSPITAL | Age: 34
End: 2024-02-19
Payer: COMMERCIAL

## 2024-02-19 ENCOUNTER — HOSPITAL ENCOUNTER (OUTPATIENT)
Facility: HOSPITAL | Age: 34
Setting detail: OUTPATIENT SURGERY
Discharge: HOME | End: 2024-02-19
Attending: SURGERY | Admitting: SURGERY
Payer: COMMERCIAL

## 2024-02-19 VITALS
DIASTOLIC BLOOD PRESSURE: 64 MMHG | WEIGHT: 138.45 LBS | OXYGEN SATURATION: 98 % | HEART RATE: 97 BPM | BODY MASS INDEX: 23.64 KG/M2 | SYSTOLIC BLOOD PRESSURE: 111 MMHG | RESPIRATION RATE: 16 BRPM | TEMPERATURE: 98.4 F | HEIGHT: 64 IN

## 2024-02-19 DIAGNOSIS — I71.40 ABDOMINAL ANEURYSM (CMS-HCC): ICD-10-CM

## 2024-02-19 DIAGNOSIS — R10.2 PELVIC PAIN: Primary | ICD-10-CM

## 2024-02-19 LAB — PREGNANCY TEST URINE, POC: NEGATIVE

## 2024-02-19 PROCEDURE — 3600000009 HC OR TIME - EACH INCREMENTAL 1 MINUTE - PROCEDURE LEVEL FOUR: Performed by: SURGERY

## 2024-02-19 PROCEDURE — 2500000005 HC RX 250 GENERAL PHARMACY W/O HCPCS: Mod: SE | Performed by: STUDENT IN AN ORGANIZED HEALTH CARE EDUCATION/TRAINING PROGRAM

## 2024-02-19 PROCEDURE — 2500000004 HC RX 250 GENERAL PHARMACY W/ HCPCS (ALT 636 FOR OP/ED): Mod: SE | Performed by: STUDENT IN AN ORGANIZED HEALTH CARE EDUCATION/TRAINING PROGRAM

## 2024-02-19 PROCEDURE — C1894 INTRO/SHEATH, NON-LASER: HCPCS | Performed by: SURGERY

## 2024-02-19 PROCEDURE — C1887 CATHETER, GUIDING: HCPCS | Performed by: SURGERY

## 2024-02-19 PROCEDURE — 3700000002 HC GENERAL ANESTHESIA TIME - EACH INCREMENTAL 1 MINUTE: Performed by: SURGERY

## 2024-02-19 PROCEDURE — 7100000002 HC RECOVERY ROOM TIME - EACH INCREMENTAL 1 MINUTE: Performed by: SURGERY

## 2024-02-19 PROCEDURE — 2780000003 HC OR 278 NO HCPCS: Performed by: SURGERY

## 2024-02-19 PROCEDURE — 75820 VEIN X-RAY ARM/LEG: CPT | Mod: LT | Performed by: SURGERY

## 2024-02-19 PROCEDURE — 2550000001 HC RX 255 CONTRASTS: Mod: SE | Performed by: SURGERY

## 2024-02-19 PROCEDURE — C1725 CATH, TRANSLUMIN NON-LASER: HCPCS | Performed by: SURGERY

## 2024-02-19 PROCEDURE — A4649 SURGICAL SUPPLIES: HCPCS | Performed by: SURGERY

## 2024-02-19 PROCEDURE — A37241: Performed by: ANESTHESIOLOGY

## 2024-02-19 PROCEDURE — C1769 GUIDE WIRE: HCPCS | Performed by: SURGERY

## 2024-02-19 PROCEDURE — 7100000010 HC PHASE TWO TIME - EACH INCREMENTAL 1 MINUTE: Performed by: SURGERY

## 2024-02-19 PROCEDURE — 3600000004 HC OR TIME - INITIAL BASE CHARGE - PROCEDURE LEVEL FOUR: Performed by: SURGERY

## 2024-02-19 PROCEDURE — 7100000009 HC PHASE TWO TIME - INITIAL BASE CHARGE: Performed by: SURGERY

## 2024-02-19 PROCEDURE — 2500000005 HC RX 250 GENERAL PHARMACY W/O HCPCS: Mod: SE | Performed by: SURGERY

## 2024-02-19 PROCEDURE — 37241 VASC EMBOLIZE/OCCLUDE VENOUS: CPT | Performed by: SURGERY

## 2024-02-19 PROCEDURE — 3700000001 HC GENERAL ANESTHESIA TIME - INITIAL BASE CHARGE: Performed by: SURGERY

## 2024-02-19 PROCEDURE — 2500000004 HC RX 250 GENERAL PHARMACY W/ HCPCS (ALT 636 FOR OP/ED): Mod: SE | Performed by: SURGERY

## 2024-02-19 PROCEDURE — 2720000007 HC OR 272 NO HCPCS: Performed by: SURGERY

## 2024-02-19 PROCEDURE — 37244 VASC EMBOLIZE/OCCLUDE BLEED: CPT | Performed by: SURGERY

## 2024-02-19 PROCEDURE — 7100000001 HC RECOVERY ROOM TIME - INITIAL BASE CHARGE: Performed by: SURGERY

## 2024-02-19 DEVICE — IMPLANTABLE DEVICE: Type: IMPLANTABLE DEVICE | Site: VEIN | Status: FUNCTIONAL

## 2024-02-19 RX ORDER — SODIUM CHLORIDE, SODIUM LACTATE, POTASSIUM CHLORIDE, CALCIUM CHLORIDE 600; 310; 30; 20 MG/100ML; MG/100ML; MG/100ML; MG/100ML
100 INJECTION, SOLUTION INTRAVENOUS CONTINUOUS
Status: DISCONTINUED | OUTPATIENT
Start: 2024-02-19 | End: 2024-02-19 | Stop reason: HOSPADM

## 2024-02-19 RX ORDER — MIDAZOLAM HYDROCHLORIDE 1 MG/ML
INJECTION, SOLUTION INTRAMUSCULAR; INTRAVENOUS AS NEEDED
Status: DISCONTINUED | OUTPATIENT
Start: 2024-02-19 | End: 2024-02-19

## 2024-02-19 RX ORDER — SODIUM TETRADECYL SULFATE 30 MG/ML
1 INJECTION, SOLUTION INTRAVENOUS ONCE
Status: DISCONTINUED | OUTPATIENT
Start: 2024-02-19 | End: 2024-02-19 | Stop reason: HOSPADM

## 2024-02-19 RX ORDER — ROCURONIUM BROMIDE 10 MG/ML
INJECTION, SOLUTION INTRAVENOUS AS NEEDED
Status: DISCONTINUED | OUTPATIENT
Start: 2024-02-19 | End: 2024-02-19

## 2024-02-19 RX ORDER — HYDROMORPHONE HYDROCHLORIDE 1 MG/ML
0.5 INJECTION, SOLUTION INTRAMUSCULAR; INTRAVENOUS; SUBCUTANEOUS EVERY 5 MIN PRN
Status: DISCONTINUED | OUTPATIENT
Start: 2024-02-19 | End: 2024-02-19 | Stop reason: HOSPADM

## 2024-02-19 RX ORDER — FENTANYL CITRATE 50 UG/ML
INJECTION, SOLUTION INTRAMUSCULAR; INTRAVENOUS AS NEEDED
Status: DISCONTINUED | OUTPATIENT
Start: 2024-02-19 | End: 2024-02-19

## 2024-02-19 RX ORDER — HYDROMORPHONE HYDROCHLORIDE 1 MG/ML
0.2 INJECTION, SOLUTION INTRAMUSCULAR; INTRAVENOUS; SUBCUTANEOUS EVERY 5 MIN PRN
Status: DISCONTINUED | OUTPATIENT
Start: 2024-02-19 | End: 2024-02-19 | Stop reason: HOSPADM

## 2024-02-19 RX ORDER — IODIXANOL 320 MG/ML
INJECTION, SOLUTION INTRAVASCULAR AS NEEDED
Status: DISCONTINUED | OUTPATIENT
Start: 2024-02-19 | End: 2024-02-19 | Stop reason: HOSPADM

## 2024-02-19 RX ORDER — CEFAZOLIN 1 G/1
INJECTION, POWDER, FOR SOLUTION INTRAVENOUS AS NEEDED
Status: DISCONTINUED | OUTPATIENT
Start: 2024-02-19 | End: 2024-02-19

## 2024-02-19 RX ORDER — SODIUM CHLORIDE, SODIUM LACTATE, POTASSIUM CHLORIDE, CALCIUM CHLORIDE 600; 310; 30; 20 MG/100ML; MG/100ML; MG/100ML; MG/100ML
INJECTION, SOLUTION INTRAVENOUS CONTINUOUS PRN
Status: DISCONTINUED | OUTPATIENT
Start: 2024-02-19 | End: 2024-02-19

## 2024-02-19 RX ORDER — PHENYLEPHRINE HYDROCHLORIDE 10 MG/ML
INJECTION INTRAVENOUS AS NEEDED
Status: DISCONTINUED | OUTPATIENT
Start: 2024-02-19 | End: 2024-02-19

## 2024-02-19 RX ORDER — LIDOCAINE HYDROCHLORIDE 10 MG/ML
INJECTION INFILTRATION; PERINEURAL AS NEEDED
Status: DISCONTINUED | OUTPATIENT
Start: 2024-02-19 | End: 2024-02-19

## 2024-02-19 RX ORDER — MIDAZOLAM HYDROCHLORIDE 1 MG/ML
INJECTION INTRAMUSCULAR; INTRAVENOUS AS NEEDED
Status: DISCONTINUED | OUTPATIENT
Start: 2024-02-19 | End: 2024-02-19

## 2024-02-19 RX ORDER — PROPOFOL 10 MG/ML
INJECTION, EMULSION INTRAVENOUS AS NEEDED
Status: DISCONTINUED | OUTPATIENT
Start: 2024-02-19 | End: 2024-02-19

## 2024-02-19 RX ORDER — ACETAMINOPHEN 325 MG/1
650 TABLET ORAL EVERY 4 HOURS PRN
Status: DISCONTINUED | OUTPATIENT
Start: 2024-02-19 | End: 2024-02-19 | Stop reason: HOSPADM

## 2024-02-19 RX ORDER — SODIUM TETRADECYL SULFATE 30 MG/ML
INJECTION, SOLUTION INTRAVENOUS CONTINUOUS PRN
Status: COMPLETED | OUTPATIENT
Start: 2024-02-19 | End: 2024-02-19

## 2024-02-19 RX ORDER — ONDANSETRON HYDROCHLORIDE 2 MG/ML
INJECTION, SOLUTION INTRAVENOUS AS NEEDED
Status: DISCONTINUED | OUTPATIENT
Start: 2024-02-19 | End: 2024-02-19

## 2024-02-19 RX ORDER — ONDANSETRON HYDROCHLORIDE 2 MG/ML
4 INJECTION, SOLUTION INTRAVENOUS ONCE AS NEEDED
Status: COMPLETED | OUTPATIENT
Start: 2024-02-19 | End: 2024-02-19

## 2024-02-19 RX ORDER — LIDOCAINE IN NACL,ISO-OSMOT/PF 30 MG/3 ML
0.1 SYRINGE (ML) INJECTION ONCE
Status: DISCONTINUED | OUTPATIENT
Start: 2024-02-19 | End: 2024-02-19 | Stop reason: HOSPADM

## 2024-02-19 RX ADMIN — MIDAZOLAM HYDROCHLORIDE 2 MG: 1 INJECTION, SOLUTION INTRAMUSCULAR; INTRAVENOUS at 12:35

## 2024-02-19 RX ADMIN — SODIUM CHLORIDE, POTASSIUM CHLORIDE, SODIUM LACTATE AND CALCIUM CHLORIDE: 600; 310; 30; 20 INJECTION, SOLUTION INTRAVENOUS at 12:36

## 2024-02-19 RX ADMIN — HYDROMORPHONE HYDROCHLORIDE 0.5 MG: 1 INJECTION, SOLUTION INTRAMUSCULAR; INTRAVENOUS; SUBCUTANEOUS at 15:08

## 2024-02-19 RX ADMIN — LIDOCAINE HYDROCHLORIDE 60 MG: 10 INJECTION, SOLUTION INFILTRATION; PERINEURAL at 12:40

## 2024-02-19 RX ADMIN — FENTANYL CITRATE 50 MCG: 50 INJECTION, SOLUTION INTRAMUSCULAR; INTRAVENOUS at 12:40

## 2024-02-19 RX ADMIN — ONDANSETRON 4 MG: 2 INJECTION INTRAMUSCULAR; INTRAVENOUS at 16:44

## 2024-02-19 RX ADMIN — Medication 5 L/MIN: at 14:50

## 2024-02-19 RX ADMIN — ONDANSETRON 4 MG: 2 INJECTION, SOLUTION INTRAMUSCULAR; INTRAVENOUS at 14:30

## 2024-02-19 RX ADMIN — SODIUM CHLORIDE, POTASSIUM CHLORIDE, SODIUM LACTATE AND CALCIUM CHLORIDE 100 ML/HR: 600; 310; 30; 20 INJECTION, SOLUTION INTRAVENOUS at 14:50

## 2024-02-19 RX ADMIN — HYDROMORPHONE HYDROCHLORIDE 0.5 MG: 1 INJECTION, SOLUTION INTRAMUSCULAR; INTRAVENOUS; SUBCUTANEOUS at 15:15

## 2024-02-19 RX ADMIN — PROPOFOL 120 MG: 10 INJECTION, EMULSION INTRAVENOUS at 12:40

## 2024-02-19 RX ADMIN — CEFAZOLIN 2 G: 330 INJECTION, POWDER, FOR SOLUTION INTRAMUSCULAR; INTRAVENOUS at 12:43

## 2024-02-19 RX ADMIN — PHENYLEPHRINE HYDROCHLORIDE 40 MCG: 10 INJECTION INTRAVENOUS at 13:09

## 2024-02-19 RX ADMIN — ROCURONIUM BROMIDE 50 MG: 10 INJECTION, SOLUTION INTRAVENOUS at 12:40

## 2024-02-19 RX ADMIN — PHENYLEPHRINE HYDROCHLORIDE 40 MCG: 10 INJECTION INTRAVENOUS at 13:05

## 2024-02-19 RX ADMIN — ACETAMINOPHEN 650 MG: 325 TABLET ORAL at 16:00

## 2024-02-19 ASSESSMENT — PAIN - FUNCTIONAL ASSESSMENT
PAIN_FUNCTIONAL_ASSESSMENT: 0-10
PAIN_FUNCTIONAL_ASSESSMENT: VAS (VISUAL ANALOG SCALE)
PAIN_FUNCTIONAL_ASSESSMENT: 0-10
PAIN_FUNCTIONAL_ASSESSMENT: VAS (VISUAL ANALOG SCALE)
PAIN_FUNCTIONAL_ASSESSMENT: VAS (VISUAL ANALOG SCALE)
PAIN_FUNCTIONAL_ASSESSMENT: 0-10

## 2024-02-19 ASSESSMENT — PAIN SCALES - GENERAL
PAINLEVEL_OUTOF10: 0 - NO PAIN
PAINLEVEL_OUTOF10: 8
PAINLEVEL_OUTOF10: 8
PAINLEVEL_OUTOF10: 0 - NO PAIN
PAINLEVEL_OUTOF10: 3
PAINLEVEL_OUTOF10: 8
PAINLEVEL_OUTOF10: 3
PAINLEVEL_OUTOF10: 0 - NO PAIN

## 2024-02-19 ASSESSMENT — PAIN DESCRIPTION - DESCRIPTORS
DESCRIPTORS: SORE;STABBING
DESCRIPTORS: SORE
DESCRIPTORS: SORE;STABBING

## 2024-02-19 ASSESSMENT — COLUMBIA-SUICIDE SEVERITY RATING SCALE - C-SSRS
6. HAVE YOU EVER DONE ANYTHING, STARTED TO DO ANYTHING, OR PREPARED TO DO ANYTHING TO END YOUR LIFE?: NO
1. IN THE PAST MONTH, HAVE YOU WISHED YOU WERE DEAD OR WISHED YOU COULD GO TO SLEEP AND NOT WAKE UP?: NO
2. HAVE YOU ACTUALLY HAD ANY THOUGHTS OF KILLING YOURSELF?: NO

## 2024-02-19 NOTE — BRIEF OP NOTE
Date: 2024  OR Location: [facility] OR    Name: Armida Adkins : 1990, Age: 33 y.o., MRN: [id number], Sex: female    Diagnosis  Pre-op Diagnosis     * Pelvic pain [R10.2] Post-op Diagnosis     * Pelvic pain [R10.2]     Procedures  RIGHT JUGULAR ACCESS WITH FOAM SCLEROTHERAPY, EMBOLIZATION OF GONADAL VEIN  17341 - CO VASCULAR EMBOLIZATION OR OCCLUSION VENOUS RS&I      Surgeons      * Marianna Ordaz - Primary    Resident/Fellow/Other Assistant:  Surgeon(s) and Role:    Procedure Summary  Anesthesia: General  ASA: ASA status not filed in the log.  Anesthesia Staff: Anesthesiologist: Eliazar Hein DO; Cate Vazquez MD  C-AA: TRA Benjamin  Anesthesia Resident: Donny Wick DO  Estimated Blood Loss: 0mL  Intra-op Medications: Administrations occurring from 1045 to 1315 on 24:  * No intraprocedure medications in log *           Anesthesia Record               Intraprocedure I/O Totals       None           Specimen: No specimens collected     Staff:   Circulator: Deb Oneal RN; Lili Escalante RN  Relief Circulator: Joy Huerta RN  Scrub Person: Mary Alegre    Findings:   Technically successful coil and sclerotherapy placement with no flow observed through L gonadal vein on completion venogram.    Complications:  None; patient tolerated the procedure well.     Disposition: PACU - hemodynamically stable.  Condition: stable  Specimens Collected: No specimens collected  Attending Attestation: I was present and scrubbed for the entire procedure.    Marianna Ordaz  Phone Number: [phone]    Flaco Urbina MD  Vascular Surgery, PGY3  Team Pager: [phone]

## 2024-02-19 NOTE — ADDENDUM NOTE
Addendum  created 02/19/24 1811 by Eliazar Hein, DO    Review and Sign - Ready for Procedure, Review and Sign - Signed

## 2024-02-19 NOTE — ANESTHESIA POSTPROCEDURE EVALUATION
Patient: Armida Adkins    Procedure Summary       Date: 02/19/24 Room / Location: [facility] OR 27 / [facility]    Anesthesia Start: 1236 Anesthesia Stop: 1449    Procedure: RIGHT JUGULAR ACCESS WITH FOAM SCLEROTHERAPY, EMBOLIZATION OF GONADAL VEIN (Right: Neck) Diagnosis:       Pelvic pain      (Pelvic pain [R10.2])    Surgeons: Marianna Ordaz MD Responsible Provider: Eliazar Hein DO    Anesthesia Type: general ASA Status: 2            Anesthesia Type: No value filed.    Vitals Value Taken Time   /75 02/19/24 1455   Temp 36.2 02/19/24 1455   Pulse 104 02/19/24 1450   Resp 17 02/19/24 1450   SpO2 100 % 02/19/24 1450   Vitals shown include unvalidated device data.    Anesthesia Post Evaluation    Patient location during evaluation: PACU  Patient participation: complete - patient cannot participate  Level of consciousness: sleepy but conscious  Pain management: adequate  Airway patency: patent  Cardiovascular status: acceptable  Respiratory status: acceptable and face mask  Hydration status: acceptable  Postoperative Nausea and Vomiting: none        No notable events documented.

## 2024-02-19 NOTE — DISCHARGE INSTRUCTIONS
You have a small incision on the right side of your neck. It is covered by a white dressing. This white dressing can be removed in 2 days (Wednesday, 2/21/2024). Under that dressing is purple skin glue. This glue will flake off on its own over the next 1-2 weeks. Do not pick at it. You may gently cleanse the area with warm water and soap.    For pain or any discomfort, taken Ibuprofen as directed.    You will follow up in 1 month with Dr. Ordaz at [place] on 3/19 at 940AM. Please call the office to confirm this.    You have no physical restrictions - walking around is encouraged; we discourage bed rest.    You may resume your regular diet.    If you have any concerns or questions, please call the office.

## 2024-02-19 NOTE — ANESTHESIA PREPROCEDURE EVALUATION
Patient: Armida Adkins    Procedure Information       Anesthesia Start Date/Time: 02/19/24 1236    Procedure: Embolization Endovascular via Lower Extremity Access    Location: [facility] OR 27 / [facility] OR    Surgeons: Marianna Ordaz MD            Relevant Problems   Anesthesia (within normal limits)      Cardiovascular   (+) AVNRT (AV kat re-entry tachycardia)   (+) Atrial flutter (CMS/HCC)   (+) Supraventricular tachycardia      GI   (+) GERD (gastroesophageal reflux disease)   (+) Irritable bowel syndrome with constipation      Neuro/Psych   (+) Carpal tunnel syndrome, bilateral upper limbs   (+) Carpal tunnel syndrome, left   (+) Carpal tunnel syndrome, right   (+) Depression      Pulmonary   (+) Asthma, intermittent      Musculoskeletal   (+) Carpal tunnel syndrome, bilateral upper limbs   (+) Carpal tunnel syndrome, left   (+) Carpal tunnel syndrome, right   (+) Scoliosis      Eyes, Ears, Nose, and Throat   (+) Bilateral sensorineural hearing loss       Clinical information reviewed:   Tobacco  Allergies  Meds   Med Hx  Surg Hx  OB Status  Fam Hx  Soc   Hx        NPO Detail:  NPO/Void Status  Carbohydrate Drink Given Prior to Surgery? : N  Date of Last Liquid: 02/18/24  Time of Last Liquid: 2000  Date of Last Solid: 02/18/24  Time of Last Solid: 2000  Last Intake Type: Clear fluids  Time of Last Void: 0929         Physical Exam    Airway  Mallampati: I  TM distance: >3 FB  Neck ROM: full     Cardiovascular   Rhythm: regular  Rate: normal     Dental - normal exam     Pulmonary - normal exam     Abdominal            Anesthesia Plan    History of general anesthesia?: yes  History of complications of general anesthesia?: no    ASA 2     general     intravenous induction   Postoperative administration of opioids is intended.  Trial extubation is planned.  Anesthetic plan and risks discussed with patient.  Use of blood products discussed with patient who.    Plan discussed with resident and  attending.

## 2024-02-19 NOTE — SIGNIFICANT EVENT
Issue With Patient Consent    Patient was seen pre-operatively in the pre-op area and the procedure, its benefits and risks, and the alternatives, were described to her. All of her questions were answered satisfactorily.    The patient was then consented using the iPad application. I personally consented the patient and the consent process, at that time, appeared to have been done and saved.    However, intra-operatively, it was noticed that her consent did not have the patient's signature, but did have mine.    Patient, without hesitation or reservation, signed the consent form this morning on iPad. It appears that there was an issue with the consent crossing over into the system.    Flaco Urbina MD  Vascular Surgery, PGY3  Team Pager: [phone]

## 2024-02-20 NOTE — OP NOTE
RIGHT JUGULAR ACCESS WITH FOAM SCLEROTHERAPY, EMBOLIZATION OF GONADAL VEIN (R) Operative Note     Date: 2024  OR Location: [facility] OR    Name: Armida Adkins YOB: 1990, Age: 33 y.o., MRN: [id number], Sex: female    Diagnosis  Pre-op Diagnosis     * Pelvic pain [R10.2] Post-op Diagnosis     * Pelvic pain [R10.2]     Procedures  RIGHT JUGULAR ACCESS WITH FOAM SCLEROTHERAPY, EMBOLIZATION OF GONADAL VEIN  10861 - ND VASCULAR EMBOLIZATION OR OCCLUSION VENOUS RS&I  Selective catheterization of Left gonadal vein into pelvic varices  Foam sclerotherapy of pelvic varices with 3% STS foam (5cc)  [place] technique left gonadal vein embolization (coils: [id number]; [id number]; [id number]; 3% STS 4cc foam)    Surgeons      * Marianna Ordaz - Primary    Resident/Fellow/Other Assistant:  Surgeon(s) and Role:    Procedure Summary  Anesthesia: General  ASA: II  Anesthesia Staff: Anesthesiologist: Eliazar Heni DO; Cate Vazquez MD  C-AA: TRA Benjamin  Anesthesia Resident: Donny Wick DO  Estimated Blood Loss: 10mL  Intra-op Medications: Administrations occurring from 1045 to 1315 on 24:  * No intraprocedure medications in log *           Anesthesia Record               Intraprocedure I/O Totals       None           Specimen: No specimens collected     Staff:   Circulator: Deb Oneal RN; Lili Escalante RN  Relief Circulator: Joy Huerta RN  Scrub Person: Mary Codey           Implants:  Implants       Type Name Action Serial No.      Coil [id number] EMBOLD SOFT DETACHABLE COIL SYSTEM Implanted [id number]     Coil [id number] EMBOLD FIBERED DETACHABLE COIL SYSTEM Implanted [id number]     Coil [id number] EMBOLD FIBERED DETACHABLE COIL SYSTEM Implanted [id number]              Indications: Armida Adkins is an 33 y.o. female who is having surgery for Pelvic pain [R10.2].  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  She also had some possible reflux emanating from the anterior branch of the left hypogastric vein and a possible left common iliac vein compression.  These 2 other findings did not have nearly as many varicosities and evidence of venous stasis as did the left gonadal vein reflux.  Given her chronic daily debilitating symptoms, she is here today for gonadal embolization and sclerotherapy of the pelvic varices.    The patient was seen in the preoperative area. The risks, benefits, complications, treatment options, non-operative alternatives, expected recovery and outcomes were discussed with the patient. The possibilities of reaction to medication, pulmonary aspiration, injury to surrounding structures, bleeding, recurrent infection, the need for additional procedures, failure to diagnose a condition, and creating a complication requiring transfusion or operation were discussed with the patient. The patient concurred with the proposed plan, giving informed consent.  The site of surgery was properly noted/marked if necessary per policy. The patient has been actively warmed in preoperative area. Preoperative antibiotics have been ordered and given within 1 hours of incision. Venous thrombosis prophylaxis have been ordered including bilateral sequential compression devices    Procedure Details: The patient was brought to the operating room.  She was resting operative table in supine position with her arms tucked.  Appropriate timeout huddle was performed by name and date of birth and medical record number.  She underwent general anesthesia given some anxiety regarding the procedure and the discussion with anesthesia.    We went ahead and prepped and draped her right neck into the field in standard fashion she received a dose of antibiotics prior to starting the procedure.  We used ultrasound-guided access to cannulate the right internal jugular vein with  ultrasound guidance and a micropuncture needle.  We upsized to the micropuncture sheath.  Ultimately placed a J-wire and upsized to the 6 cm sheath.  Remove the dilator and wire heparin flush the sheath and then used a Glidewire with a C2 catheter to cannulate to the IVC and get into the abdominal cavity.  We upsized over a Anne wire to a 6 Setswana 45 cm sheath.  We parked this just around the L2 level which we knew was the location for the left renal vein.  Then using a combination of the C2 catheter and the nonstiff Glidewire, we cannulated the left renal vein and made our way down to the gonadal vein on the left side all the way down to the pelvic varices.  Then we exchanged out the C2 catheter for a 6 x 2 cm balloon.  We parked our sheath in the orifice of the left renal vein and the balloon down by the pelvic varices.  We did a venogram to guide our wire advancement and assure that we were in the main left gonadal vein.  Once we reached to the bottom of the pelvis, I injected some contrast to get a sense of how much sclerosant to put into the pelvis and then we put her balloon up and injected a total of 5 cc of STS mixed in with Visipaque and 3 cc of room air for mixture of foam sclerotherapy.  Total foam volume was 5 cc into the pelvis including what was in the catheter which was a little bit less than 1 cc.  We let that sit for about 2 minutes and then advanced a microcatheter through the balloon port and placed this just ahead of the balloon and started to place her first coil after the balloon was deflated and replaced a 6 x 60 cm coil with good location and good packing pulled back a couple centimeters and then did another foam injection with again the 3% STS and phone dilution followed by another coil which was an 8 mm x 20 cm, 1 more injection of sclerotherapy, and the final coil near the L2 level before the renal vein which was the 10 mm x 50 cm coil.  Total foam injected into the left gonadal was about  4 cc of foam sclerotherapy.  Tessari method with low volume room air was used for this approach.    Venographic evaluation showed good spasm and good location and stabilization of the coils.  And then we proceeded to do a final venogram from the renal vein with no further reflux down the left gonadal and now a nice brisk emptying through the renal vein into the IVC.  At this point our procedure was completed we removed the catheter and sheath from the right neck and held pressure for about 5 to 10 minutes with good hemostasis.  The patient was extubated.  She was taken to the PACU for recovery.    Complications:  None; patient tolerated the procedure well.    Disposition: PACU - hemodynamically stable.  Condition: stable       Attending Attestation: I was present and scrubbed for the entire procedure.    Marianna Ordaz  Phone Number: [phone]

## 2024-02-27 ENCOUNTER — TELEMEDICINE (OUTPATIENT)
Dept: PRIMARY CARE | Facility: CLINIC | Age: 34
End: 2024-02-27
Payer: COMMERCIAL

## 2024-02-27 DIAGNOSIS — M54.50 LOW BACK PAIN, UNSPECIFIED BACK PAIN LATERALITY, UNSPECIFIED CHRONICITY, UNSPECIFIED WHETHER SCIATICA PRESENT: Primary | ICD-10-CM

## 2024-02-27 PROCEDURE — 1036F TOBACCO NON-USER: CPT | Performed by: INTERNAL MEDICINE

## 2024-02-27 PROCEDURE — 99213 OFFICE O/P EST LOW 20 MIN: CPT | Performed by: INTERNAL MEDICINE

## 2024-02-27 RX ORDER — CYCLOBENZAPRINE HCL 10 MG
10 TABLET ORAL NIGHTLY PRN
Qty: 30 TABLET | Refills: 0 | Status: SHIPPED | OUTPATIENT
Start: 2024-02-27 | End: 2024-02-27 | Stop reason: SDUPTHER

## 2024-02-27 RX ORDER — CYCLOBENZAPRINE HCL 10 MG
10 TABLET ORAL NIGHTLY PRN
Qty: 30 TABLET | Refills: 0 | Status: SHIPPED | OUTPATIENT
Start: 2024-02-27 | End: 2024-04-29

## 2024-02-27 RX ORDER — NAPROXEN 500 MG/1
500 TABLET ORAL 2 TIMES DAILY PRN
Qty: 60 TABLET | Refills: 0 | Status: SHIPPED | OUTPATIENT
Start: 2024-02-27 | End: 2024-03-26

## 2024-02-27 NOTE — PROGRESS NOTES
Chief Complaint: No chief complaint on file.     MAXIMINO Adkins is a 33 y.o. year old female who presents to the clinic for low back pain.    Past Medical History   Past Medical History:   Diagnosis Date    Acute sinusitis, unspecified 2016    Acute rhinosinusitis    Encounter for initial prescription of contraceptive pills 08/10/2018    Encounter for initial prescription of contraceptive pills    Encounter for removal of intrauterine contraceptive device 2016    Encounter for IUD removal    Encounter for screening for malignant neoplasm of cervix 2015    Pap smear for cervical cancer screening    Myopia, bilateral 2014    Bilateral myopia    Other conditions influencing health status      0    Other specified health status 2017    Contraception    Other specified symptoms and signs involving the digestive system and abdomen 2015    Difficulty swallowing pills    Pain in right shoulder 10/07/2020    Right shoulder pain    Pelvic and perineal pain 2021    Pelvic pain    Personal history of other diseases of the digestive system 2015    History of hemorrhoids    Personal history of other diseases of the female genital tract 08/10/2018    History of vaginal discharge    Personal history of other diseases of the female genital tract 08/10/2018    History of vulvodynia    Personal history of other diseases of the female genital tract 2015    History of metrorrhagia    Personal history of other diseases of the musculoskeletal system and connective tissue 10/13/2022    History of scoliosis    Personal history of other diseases of the respiratory system     History of asthma    Personal history of other specified conditions     H/O shortness of breath    Unspecified astigmatism, bilateral 2014    Astigmatism, bilateral      Past Surgical History:   Past Surgical History:   Procedure Laterality Date    IR ANGIOGRAM INFERIOR EPIGASTRIC  2023    IR  ANGIOGRAM INFERIOR EPIGASTRIC 2/9/2023 [facility]    IR ANGIOGRAM INFERIOR EPIGASTRIC PELVIC  2/9/2023    IR ANGIOGRAM INFERIOR EPIGASTRIC PELVIC 2/9/2023 [facility] CLINICAL [facility]    IR ANGIOGRAM INFERIOR EPIGASTRIC PELVIC  2/9/2023    IR ANGIOGRAM INFERIOR EPIGASTRIC PELVIC 2/9/2023 [facility] CLINICAL [facility]GAC[facility]    IR VENOGRAM LOWER EXTREMITY Left 12/11/2023    IR VENOGRAM LOWER EXTREMITY 12/11/2023 Marianna Ordaz MD [facility] ANGIO    OTHER SURGICAL HISTORY  08/18/2020    Removal    OTHER SURGICAL HISTORY  07/31/2015    Surgery Suture Of Gum Laceration     Family History:   Family History   Problem Relation Name Age of Onset    Uterine cancer Mother      Seizures Mother      Other (cerebrovascular accident) Father      Uterine cancer Sister      Colon cancer Mother's Brother       Social History:   Tobacco Use: Low Risk  (2/19/2024)    Patient History     Smoking Tobacco Use: Never     Smokeless Tobacco Use: Never     Passive Exposure: Not on file      Social History     Substance and Sexual Activity   Alcohol Use Defer        Allergies:   Allergies   Allergen Reactions    Benzonatate Unknown    Coconut Itching    Erythromycin-Benzoyl Peroxide Unknown    Fruit Flavor Unknown    Hydrocortisone Unknown    Peach Itching    Pineapple Itching    Pollen Extracts Unknown    Sulfamethoxazole-Trimethoprim Swelling        ROS   Review of Systems     Objective   There were no vitals filed for this visit.   BMI Readings from Last 15 Encounters:   02/19/24 23.76 kg/m²   02/07/24 23.00 kg/m²   01/11/24 23.17 kg/m²   12/11/23 22.97 kg/m²   11/30/23 21.97 kg/m²   11/22/23 22.26 kg/m²   10/03/23 21.97 kg/m²   09/21/23 21.97 kg/m²   08/08/23 21.97 kg/m²   07/06/23 22.21 kg/m²   05/26/23 22.31 kg/m²   05/02/23 21.30 kg/m²   04/20/23 21.63 kg/m²   04/13/23 22.16 kg/m²   03/15/23 22.14 kg/m²      62.8 kg (138 lb 7.2 oz) (2/19/2024  9:28 AM)      Physical Exam  Physical Exam  Neurological:      Mental Status: She is alert.   Psychiatric:         " Mood and Affect: Mood normal.          Labs:  Lab Results   Component Value Date    WBC 7.0 02/13/2024    HGB 12.5 02/13/2024    HCT 37.8 02/13/2024    MCV 82 02/13/2024     02/13/2024     Lab Results   Component Value Date    GLUCOSE 100 (H) 02/13/2024    CALCIUM 9.5 02/13/2024     02/13/2024    K 3.9 02/13/2024    CO2 26 02/13/2024     02/13/2024    BUN 6 02/13/2024    CREATININE 0.74 02/13/2024         No components found for: \"URINE ALBUMIN\"  Lab Results   Component Value Date    HGBA1C 4.7 12/08/2022      Lab Results   Component Value Date    HGBA1C 4.7 12/08/2022    HGBA1C 5.1 01/11/2022    HGBA1C 4.8 08/07/2020     Lab Results   Component Value Date    TSH 1.14 11/30/2023       Current Medications:  Current Outpatient Medications   Medication Sig Dispense Refill    acetaminophen (Tylenol 8 HOUR) 650 mg ER tablet Take 1 tablet (650 mg) by mouth 2 times a day.      albuterol 2.5 mg /3 mL (0.083 %) nebulizer solution Inhale 3 mL (2.5 mg) every 4 hours if needed for wheezing.      albuterol 90 mcg/actuation inhaler Inhale 1-2 puffs every 4 hours if needed for shortness of breath. 18 g 2    cyclobenzaprine (Flexeril) 10 mg tablet Take 1 tablet (10 mg) by mouth as needed at bedtime for muscle spasms. 30 tablet 0    estradiol (Estrace) 0.01 % (0.1 mg/gram) vaginal cream PLACE A PEA SIZED AMOUNT ON YOUR FINGER AND APPLY TO SORE AREA OF VULVA. USE NIGHTLY FOR ONE MONTH.      etonogestreL-ethinyl estradioL (Nuvaring) 0.12-0.015 mg/24 hr vaginal ring Insert into the vagina.      hydrocortisone 2.5 % cream Apply twice daily to affected areas      ibuprofen 600 mg tablet Take 1 tablet (600 mg) by mouth every 8 hours if needed for mild pain (1 - 3).      lactulose 10 gram/15 mL (15 mL) solution Take 10 g by mouth 2 times a day as needed (constipation). 1000 mL 3    mometasone (Nasonex) 50 mcg/actuation nasal spray ADMINISTER 2 SPRAYS INTO EACH NOSTRIL 2 TIMES A DAY. 17 g 2    naproxen (Naprosyn) 500 mg " tablet Take 1 tablet (500 mg) by mouth 2 times a day as needed for mild pain (1 - 3) (pain). 60 tablet 0     No current facility-administered medications for this visit.       Assessment and Plan  Diagnoses and all orders for this visit:  Low back pain, unspecified back pain laterality, unspecified chronicity, unspecified whether sciatica present (Primary)  -     Referral to Physical Therapy; Future  -     Discontinue: cyclobenzaprine (Flexeril) 10 mg tablet; Take 1 tablet (10 mg) by mouth as needed at bedtime for muscle spasms.  -     naproxen (Naprosyn) 500 mg tablet; Take 1 tablet (500 mg) by mouth 2 times a day as needed for mild pain (1 - 3) (pain).  -     cyclobenzaprine (Flexeril) 10 mg tablet; Take 1 tablet (10 mg) by mouth as needed at bedtime for muscle spasms.       Back pain.  Likely due to strain of the back muscles.  History of low back pain in the past.    === 11/07/22 ===    MR PELVIS W AND WO CONTRAST    - Impression -  Nonenlarged uterus. No discrete myometrial lesion.    Symmetric appearance of the ovaries, each containing small T2  hyperintense cysts/follicles.    Left posterolateral pelvic vague approximate 1.5 cm lesion, possibly  a small endometrioma.    Dilated tortuous parametrial vessels bilaterally. Pelvic congestion  syndrome is a consideration in the appropriate clinical setting.    Nonspecific trace intrapelvic free fluid  === 11/07/22 ===  MRI back results reviewed  Has scoliosis    At this time recommend PRN Flexeril and PT.  May consider ortho spine referral in the future.          Immunizations:  Immunization History   Administered Date(s) Administered    Pf[employer] Purple Cap SARS-CoV-2 04/19/2021, 05/10/2021, 01/05/2022     An interactive audio and video telecommunication system which permits real time communications between the patient (at the originating site) and provider (at the distant site) was utilized to provide this telehealth service.    Verbal consent was requested and  obtained from the patient on the day of encounter.  This is a virtual visit using HIPAA compliant video platform. It required patient-provider interaction for the medical decision making as documented.     I have communicated my name and active licensure. The patient's identity and physical location were verified at the time of this visit.   Either the patient or their legal representative has been informed of the risks and benefits of -- and alternatives to -- treatment through a remote evaluation and consents to proceed with the evaluation remotely.

## 2024-03-19 ENCOUNTER — PREP FOR PROCEDURE (OUTPATIENT)
Dept: VASCULAR SURGERY | Facility: CLINIC | Age: 34
End: 2024-03-19

## 2024-03-19 ENCOUNTER — OFFICE VISIT (OUTPATIENT)
Dept: VASCULAR SURGERY | Facility: CLINIC | Age: 34
End: 2024-03-19
Payer: COMMERCIAL

## 2024-03-19 VITALS
WEIGHT: 143 LBS | HEART RATE: 87 BPM | BODY MASS INDEX: 24.41 KG/M2 | HEIGHT: 64 IN | DIASTOLIC BLOOD PRESSURE: 77 MMHG | SYSTOLIC BLOOD PRESSURE: 111 MMHG

## 2024-03-19 DIAGNOSIS — R10.2 PELVIC PAIN: Primary | ICD-10-CM

## 2024-03-19 PROCEDURE — 99214 OFFICE O/P EST MOD 30 MIN: CPT | Performed by: NURSE PRACTITIONER

## 2024-03-19 PROCEDURE — 1036F TOBACCO NON-USER: CPT | Performed by: NURSE PRACTITIONER

## 2024-03-19 PROCEDURE — 99214 OFFICE O/P EST MOD 30 MIN: CPT | Mod: ZK | Performed by: NURSE PRACTITIONER

## 2024-03-19 ASSESSMENT — ENCOUNTER SYMPTOMS
FEVER: 0
CHILLS: 0
FLANK PAIN: 1
DYSURIA: 0
HEMATURIA: 0
APPETITE CHANGE: 0
DIFFICULTY URINATING: 0
UNEXPECTED WEIGHT CHANGE: 0

## 2024-03-19 ASSESSMENT — PAIN SCALES - GENERAL: PAINLEVEL: 0-NO PAIN

## 2024-03-19 ASSESSMENT — PATIENT HEALTH QUESTIONNAIRE - PHQ9
2. FEELING DOWN, DEPRESSED OR HOPELESS: NOT AT ALL
SUM OF ALL RESPONSES TO PHQ9 QUESTIONS 1 AND 2: 0
1. LITTLE INTEREST OR PLEASURE IN DOING THINGS: NOT AT ALL

## 2024-03-19 NOTE — PATIENT INSTRUCTIONS
It was a pleasure taking care of you today and appreciate your seeing us at our [facility].     Today's plan is as follows:  1) Start MPFF, vein formula to help with venous inflammation  2) Will plan for second pelvic sclerotherapy at [facility] [place] on 4/15/24    Please call the office with any questions at [phone].   You can speak to our secretaries or our clinical nurses for specific questions.   For Ve[facility] Center specific questions, you can also call [phone] or email at [email or web address]  If you need coordinating your appointments and testing you can do these at the  or by calling my office shortly after your visit.

## 2024-03-19 NOTE — PROGRESS NOTES
F/U REASON: pelvic pain    CURRENT ENCOUNTER:  Armida Adkins is 33 y.o. female here for follow up of RIGHT JUGULAR ACCESS WITH FOAM SCLEROTHERAPY, EMBOLIZATION OF GONADAL VEIN with Dr. Marianna Ordaz 2/19/24.    Last week had intercourse which caused nausea, pelvic cramping immediately, worse than prior to the pelvic sclerotherapy.     Reports a constant 6/10 left upper abd pain, heaviness/cramping/pulling   Nothing improves the symptoms    Reports worsening L flank pain since the last procedure. Muscle relaxants help improve the back/flank pain but not the pelvic discomfort    Notes bloating with abdominal pain during menses, worse now since the procedure    Off the Eliquis since last year (AF)    No hematuria     Denies pregnancy    Meds:     Current Outpatient Medications:     acetaminophen (Tylenol 8 HOUR) 650 mg ER tablet, Take 1 tablet (650 mg) by mouth 2 times a day., Disp: , Rfl:     albuterol 2.5 mg /3 mL (0.083 %) nebulizer solution, Inhale 3 mL (2.5 mg) every 4 hours if needed for wheezing., Disp: , Rfl:     albuterol 90 mcg/actuation inhaler, Inhale 1-2 puffs every 4 hours if needed for shortness of breath., Disp: 18 g, Rfl: 2    cyclobenzaprine (Flexeril) 10 mg tablet, Take 1 tablet (10 mg) by mouth as needed at bedtime for muscle spasms., Disp: 30 tablet, Rfl: 0    estradiol (Estrace) 0.01 % (0.1 mg/gram) vaginal cream, PLACE A PEA SIZED AMOUNT ON YOUR FINGER AND APPLY TO SORE AREA OF VULVA. USE NIGHTLY FOR ONE MONTH., Disp: , Rfl:     etonogestreL-ethinyl estradioL (Nuvaring) 0.12-0.015 mg/24 hr vaginal ring, Insert into the vagina., Disp: , Rfl:     hydrocortisone 2.5 % cream, Apply twice daily to affected areas, Disp: , Rfl:     ibuprofen 600 mg tablet, Take 1 tablet (600 mg) by mouth every 8 hours if needed for mild pain (1 - 3)., Disp: , Rfl:     lactulose 10 gram/15 mL (15 mL) solution, Take 10 g by mouth 2 times a day as needed (constipation)., Disp: 1000 mL, Rfl: 3    mometasone (Nasonex) 50  mcg/actuation nasal spray, ADMINISTER 2 SPRAYS INTO EACH NOSTRIL 2 TIMES A DAY., Disp: 17 g, Rfl: 2    naproxen (Naprosyn) 500 mg tablet, Take 1 tablet (500 mg) by mouth 2 times a day as needed for mild pain (1 - 3) (pain)., Disp: 60 tablet, Rfl: 0    Allergies:   Allergies   Allergen Reactions    Benzonatate Unknown    Coconut Itching    Erythromycin-Benzoyl Peroxide Unknown    Fruit Flavor Unknown    Hydrocortisone Unknown    Peach Itching    Pineapple Itching    Pollen Extracts Unknown    Sulfamethoxazole-Trimethoprim Swelling     ROS:  Review of Systems   Constitutional:  Negative for appetite change, chills, fever and unexpected weight change.   Genitourinary:  Positive for dyspareunia, flank pain, menstrual problem, pelvic pain and vaginal pain. Negative for decreased urine volume, difficulty urinating, dysuria, hematuria, urgency, vaginal bleeding and vaginal discharge.        Left flank pain. No superficial varicose veins     otherwise unremarkable    Objective:  Vitals:  Vitals:    03/19/24 1117   BP: 111/77   Pulse: 87      Physical Exam  Constitutional:       Appearance: Normal appearance. She is normal weight.   HENT:      Head: Normocephalic and atraumatic.      Nose: Nose normal.      Mouth/Throat:      Mouth: Mucous membranes are moist.      Pharynx: Oropharynx is clear.   Eyes:      Conjunctiva/sclera: Conjunctivae normal.   Cardiovascular:      Rate and Rhythm: Normal rate.      Pulses: Normal pulses.   Pulmonary:      Effort: Pulmonary effort is normal.   Abdominal:      General: Abdomen is flat. There is no distension.      Palpations: Abdomen is soft. There is no mass.      Tenderness: There is no abdominal tenderness. There is no guarding.      Hernia: No hernia is present.   Genitourinary:     Comments: No superficial varicose veins in  area  Musculoskeletal:         General: Normal range of motion.      Cervical back: Normal range of motion and neck supple.   Skin:     General: Skin is warm  and dry.      Capillary Refill: Capillary refill takes less than 2 seconds.   Neurological:      General: No focal deficit present.      Mental Status: She is alert and oriented to person, place, and time.   Psychiatric:         Mood and Affect: Mood normal.         Behavior: Behavior normal.         Thought Content: Thought content normal.         Judgment: Judgment normal.     Labs:  Lab Results   Component Value Date    WBC 7.0 02/13/2024    WBC 8.0 11/30/2023    WBC 10.3 09/10/2023    HGB 12.5 02/13/2024    HGB 12.9 11/30/2023    HGB 11.0 (L) 09/10/2023    HCT 37.8 02/13/2024    HCT 38.9 11/30/2023    HCT 33.6 (L) 09/10/2023    MCV 82 02/13/2024    MCV 83 11/30/2023    MCV 85 09/10/2023     02/13/2024     Lab Results   Component Value Date    CREATININE 0.74 02/13/2024    CREATININE 0.83 11/30/2023    CREATININE 0.83 09/10/2023    BUN 6 02/13/2024    BUN 8 11/30/2023    BUN 9 09/10/2023     02/13/2024     11/30/2023     (L) 09/10/2023    K 3.9 02/13/2024    K 4.0 11/30/2023    K 3.6 09/10/2023     02/13/2024     11/30/2023     09/10/2023    CO2 26 02/13/2024    CO2 27 11/30/2023    CO2 26 09/10/2023       Assessment & Plan:  Armida Adkins is 33 y.o. female with pelvic pain, intermittent on the left, with L flank pain.      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 Sanchez 2/9/2023. Pt states that she has been experience intermittent left flank pain and pelvic pain for approximately three years The pelvic pain leads to significant dyspareunia. Pt also experiences left abdominal pain during intercourse that is severe and bloating with abdominal pain during menses. No alleviating or aggravating pain. The pain is intermittent.  Describes it as sharp and severe.  She underwent a venogram which demonstrated a pressure differential between the left renal vein and IVC of 4 mmHg  After the procedure the pt went into afib  with RVR and underwent cardioversion, was on Eliquis until May 2023      + left flank pain at random times, no hematuria, + abdominal bloating     12/11/23: ULTRASOUND RIGHT JUGULAR VEIN ACCESS, LEFT RENAL VEIN, BILATERAL ILIOFEMORAL VENOGRAPHY, AND IVUS LEFT GONADAL AND PELVIC VENOGRAM     2/19/24 RIGHT JUGULAR ACCESS WITH FOAM SCLEROTHERAPY, EMBOLIZATION OF GONADAL VEIN with Dr. Marianna Ordaz    1) Starting OTC MPFF, vein formula for venous inflammation, will consider steroid in the future if MPFF does not improve symptoms    2) Will plan for second pelvic sclerotherapy at [facility] on 4/15/24 with Dr. Ordaz    Patient seen with Dr. Ordaz today in the office    Breann London, SUMA, APRN-CNP, AGPCNP-C, FNP-C, AGACNP-BC  Senior Nurse Practitioner, Vascular Surgery  [facility], [facility] [place]  [address], Office [phone]

## 2024-03-26 DIAGNOSIS — M54.50 LOW BACK PAIN, UNSPECIFIED BACK PAIN LATERALITY, UNSPECIFIED CHRONICITY, UNSPECIFIED WHETHER SCIATICA PRESENT: ICD-10-CM

## 2024-03-26 RX ORDER — NAPROXEN 500 MG/1
TABLET ORAL
Qty: 60 TABLET | Refills: 0 | Status: SHIPPED | OUTPATIENT
Start: 2024-03-26

## 2024-04-10 ENCOUNTER — TELEPHONE (OUTPATIENT)
Dept: VASCULAR SURGERY | Facility: HOSPITAL | Age: 34
End: 2024-04-10
Payer: COMMERCIAL

## 2024-04-10 DIAGNOSIS — R10.2 PELVIC PAIN: Primary | ICD-10-CM

## 2024-04-10 NOTE — TELEPHONE ENCOUNTER
Spoke with patient regarding pre-op instructions for 4/15/24 procedure with Dr. Ordaz including NPO after midnight night prior to surgery; no meds to take in the a.m. of surgery; need for transportation; and need for updated bloodwork and urine pregnancy test.  Patient verbalized understanding of all instructions and states will go for labs tomorrow.  Encouraged patient to call with questions/concerns.

## 2024-04-11 ENCOUNTER — LAB (OUTPATIENT)
Dept: LAB | Facility: LAB | Age: 34
End: 2024-04-11
Payer: COMMERCIAL

## 2024-04-11 DIAGNOSIS — N94.89 PELVIC CONGESTION SYNDROME: ICD-10-CM

## 2024-04-11 DIAGNOSIS — N94.89 PELVIC CONGESTION SYNDROME: Primary | ICD-10-CM

## 2024-04-11 DIAGNOSIS — R10.2 PELVIC PAIN: ICD-10-CM

## 2024-04-11 LAB
ANION GAP SERPL CALC-SCNC: 12 MMOL/L (ref 10–20)
BASOPHILS # BLD AUTO: 0.04 X10*3/UL (ref 0–0.1)
BASOPHILS NFR BLD AUTO: 0.5 %
BUN SERPL-MCNC: 7 MG/DL (ref 6–23)
CALCIUM SERPL-MCNC: 9.3 MG/DL (ref 8.6–10.6)
CHLORIDE SERPL-SCNC: 106 MMOL/L (ref 98–107)
CO2 SERPL-SCNC: 26 MMOL/L (ref 21–32)
CREAT SERPL-MCNC: 0.79 MG/DL (ref 0.5–1.05)
EGFRCR SERPLBLD CKD-EPI 2021: >90 ML/MIN/1.73M*2
EOSINOPHIL # BLD AUTO: 0.21 X10*3/UL (ref 0–0.7)
EOSINOPHIL NFR BLD AUTO: 2.7 %
ERYTHROCYTE [DISTWIDTH] IN BLOOD BY AUTOMATED COUNT: 12.7 % (ref 11.5–14.5)
GLUCOSE SERPL-MCNC: 91 MG/DL (ref 74–99)
HCG UR QL IA.RAPID: NEGATIVE
HCT VFR BLD AUTO: 37.8 % (ref 36–46)
HGB BLD-MCNC: 12.2 G/DL (ref 12–16)
IMM GRANULOCYTES # BLD AUTO: 0.04 X10*3/UL (ref 0–0.7)
IMM GRANULOCYTES NFR BLD AUTO: 0.5 % (ref 0–0.9)
LYMPHOCYTES # BLD AUTO: 2.26 X10*3/UL (ref 1.2–4.8)
LYMPHOCYTES NFR BLD AUTO: 29.4 %
MCH RBC QN AUTO: 27.2 PG (ref 26–34)
MCHC RBC AUTO-ENTMCNC: 32.3 G/DL (ref 32–36)
MCV RBC AUTO: 84 FL (ref 80–100)
MONOCYTES # BLD AUTO: 0.45 X10*3/UL (ref 0.1–1)
MONOCYTES NFR BLD AUTO: 5.8 %
NEUTROPHILS # BLD AUTO: 4.7 X10*3/UL (ref 1.2–7.7)
NEUTROPHILS NFR BLD AUTO: 61.1 %
NRBC BLD-RTO: 0 /100 WBCS (ref 0–0)
PLATELET # BLD AUTO: 292 X10*3/UL (ref 150–450)
POTASSIUM SERPL-SCNC: 4 MMOL/L (ref 3.5–5.3)
RBC # BLD AUTO: 4.49 X10*6/UL (ref 4–5.2)
SODIUM SERPL-SCNC: 140 MMOL/L (ref 136–145)
WBC # BLD AUTO: 7.7 X10*3/UL (ref 4.4–11.3)

## 2024-04-11 PROCEDURE — 85025 COMPLETE CBC W/AUTO DIFF WBC: CPT

## 2024-04-11 PROCEDURE — 36415 COLL VENOUS BLD VENIPUNCTURE: CPT

## 2024-04-11 PROCEDURE — 80048 BASIC METABOLIC PNL TOTAL CA: CPT

## 2024-04-11 PROCEDURE — 81025 URINE PREGNANCY TEST: CPT

## 2024-04-15 ENCOUNTER — HOSPITAL ENCOUNTER (OUTPATIENT)
Facility: HOSPITAL | Age: 34
Setting detail: OUTPATIENT SURGERY
Discharge: HOME | End: 2024-04-15
Attending: SURGERY | Admitting: SURGERY
Payer: COMMERCIAL

## 2024-04-15 ENCOUNTER — APPOINTMENT (OUTPATIENT)
Dept: RADIOLOGY | Facility: HOSPITAL | Age: 34
End: 2024-04-15
Payer: COMMERCIAL

## 2024-04-15 ENCOUNTER — ANESTHESIA EVENT (OUTPATIENT)
Dept: OPERATING ROOM | Facility: HOSPITAL | Age: 34
End: 2024-04-15
Payer: COMMERCIAL

## 2024-04-15 ENCOUNTER — ANESTHESIA (OUTPATIENT)
Dept: OPERATING ROOM | Facility: HOSPITAL | Age: 34
End: 2024-04-15
Payer: COMMERCIAL

## 2024-04-15 VITALS
DIASTOLIC BLOOD PRESSURE: 64 MMHG | HEART RATE: 77 BPM | BODY MASS INDEX: 24.78 KG/M2 | WEIGHT: 145.17 LBS | TEMPERATURE: 97.2 F | RESPIRATION RATE: 16 BRPM | OXYGEN SATURATION: 97 % | SYSTOLIC BLOOD PRESSURE: 109 MMHG | HEIGHT: 64 IN

## 2024-04-15 DIAGNOSIS — R10.2 PELVIC PAIN: ICD-10-CM

## 2024-04-15 DIAGNOSIS — R52 PAINFUL VEINS: Primary | ICD-10-CM

## 2024-04-15 DIAGNOSIS — N94.89 PELVIC CONGESTION: ICD-10-CM

## 2024-04-15 DIAGNOSIS — R09.89 PAINFUL VEINS: Primary | ICD-10-CM

## 2024-04-15 LAB
ABO GROUP (TYPE) IN BLOOD: NORMAL
RH FACTOR (ANTIGEN D): NORMAL

## 2024-04-15 PROCEDURE — C1769 GUIDE WIRE: HCPCS | Performed by: SURGERY

## 2024-04-15 PROCEDURE — 2780000003 HC OR 278 NO HCPCS: Performed by: SURGERY

## 2024-04-15 PROCEDURE — 2550000001 HC RX 255 CONTRASTS: Performed by: SURGERY

## 2024-04-15 PROCEDURE — 2720000007 HC OR 272 NO HCPCS: Performed by: SURGERY

## 2024-04-15 PROCEDURE — A36005 PR INJECTION PROC,EXTREMITY,VENOGRAPHY

## 2024-04-15 PROCEDURE — 37241 VASC EMBOLIZE/OCCLUDE VENOUS: CPT | Performed by: SURGERY

## 2024-04-15 PROCEDURE — 76000 FLUOROSCOPY <1 HR PHYS/QHP: CPT | Mod: 59 | Performed by: SURGERY

## 2024-04-15 PROCEDURE — 7100000009 HC PHASE TWO TIME - INITIAL BASE CHARGE: Performed by: SURGERY

## 2024-04-15 PROCEDURE — 3700000002 HC GENERAL ANESTHESIA TIME - EACH INCREMENTAL 1 MINUTE: Performed by: SURGERY

## 2024-04-15 PROCEDURE — 7100000010 HC PHASE TWO TIME - EACH INCREMENTAL 1 MINUTE: Performed by: SURGERY

## 2024-04-15 PROCEDURE — 2500000004 HC RX 250 GENERAL PHARMACY W/ HCPCS (ALT 636 FOR OP/ED)

## 2024-04-15 PROCEDURE — 2500000005 HC RX 250 GENERAL PHARMACY W/O HCPCS

## 2024-04-15 PROCEDURE — 3600000009 HC OR TIME - EACH INCREMENTAL 1 MINUTE - PROCEDURE LEVEL FOUR: Performed by: SURGERY

## 2024-04-15 PROCEDURE — 3600000004 HC OR TIME - INITIAL BASE CHARGE - PROCEDURE LEVEL FOUR: Performed by: SURGERY

## 2024-04-15 PROCEDURE — 2500000005 HC RX 250 GENERAL PHARMACY W/O HCPCS: Performed by: ANESTHESIOLOGY

## 2024-04-15 PROCEDURE — 76000 FLUOROSCOPY <1 HR PHYS/QHP: CPT | Mod: 59

## 2024-04-15 PROCEDURE — A36005 PR INJECTION PROC,EXTREMITY,VENOGRAPHY: Performed by: ANESTHESIOLOGY

## 2024-04-15 PROCEDURE — 7100000001 HC RECOVERY ROOM TIME - INITIAL BASE CHARGE: Performed by: SURGERY

## 2024-04-15 PROCEDURE — C1889 IMPLANT/INSERT DEVICE, NOC: HCPCS | Performed by: SURGERY

## 2024-04-15 PROCEDURE — 36415 COLL VENOUS BLD VENIPUNCTURE: CPT | Performed by: SURGERY

## 2024-04-15 PROCEDURE — 7100000002 HC RECOVERY ROOM TIME - EACH INCREMENTAL 1 MINUTE: Performed by: SURGERY

## 2024-04-15 PROCEDURE — C1887 CATHETER, GUIDING: HCPCS | Performed by: SURGERY

## 2024-04-15 PROCEDURE — 3700000001 HC GENERAL ANESTHESIA TIME - INITIAL BASE CHARGE: Performed by: SURGERY

## 2024-04-15 PROCEDURE — 2500000005 HC RX 250 GENERAL PHARMACY W/O HCPCS: Performed by: SURGERY

## 2024-04-15 PROCEDURE — 2500000004 HC RX 250 GENERAL PHARMACY W/ HCPCS (ALT 636 FOR OP/ED): Performed by: ANESTHESIOLOGY

## 2024-04-15 PROCEDURE — C1894 INTRO/SHEATH, NON-LASER: HCPCS | Performed by: SURGERY

## 2024-04-15 PROCEDURE — 2500000001 HC RX 250 WO HCPCS SELF ADMINISTERED DRUGS (ALT 637 FOR MEDICARE OP): Performed by: ANESTHESIOLOGY

## 2024-04-15 PROCEDURE — 76937 US GUIDE VASCULAR ACCESS: CPT | Performed by: SURGERY

## 2024-04-15 PROCEDURE — C1725 CATH, TRANSLUMIN NON-LASER: HCPCS | Performed by: SURGERY

## 2024-04-15 DEVICE — IMPLANTABLE DEVICE: Type: IMPLANTABLE DEVICE | Site: PELVIS | Status: FUNCTIONAL

## 2024-04-15 RX ORDER — SODIUM CHLORIDE, SODIUM LACTATE, POTASSIUM CHLORIDE, CALCIUM CHLORIDE 600; 310; 30; 20 MG/100ML; MG/100ML; MG/100ML; MG/100ML
INJECTION, SOLUTION INTRAVENOUS CONTINUOUS PRN
Status: DISCONTINUED | OUTPATIENT
Start: 2024-04-15 | End: 2024-04-15

## 2024-04-15 RX ORDER — LIDOCAINE HCL/PF 100 MG/5ML
SYRINGE (ML) INTRAVENOUS AS NEEDED
Status: DISCONTINUED | OUTPATIENT
Start: 2024-04-15 | End: 2024-04-15

## 2024-04-15 RX ORDER — LIDOCAINE HYDROCHLORIDE 10 MG/ML
0.1 INJECTION INFILTRATION; PERINEURAL ONCE
Status: DISCONTINUED | OUTPATIENT
Start: 2024-04-15 | End: 2024-04-15 | Stop reason: HOSPADM

## 2024-04-15 RX ORDER — ONDANSETRON HYDROCHLORIDE 2 MG/ML
INJECTION, SOLUTION INTRAVENOUS AS NEEDED
Status: DISCONTINUED | OUTPATIENT
Start: 2024-04-15 | End: 2024-04-15

## 2024-04-15 RX ORDER — PROPOFOL 10 MG/ML
INJECTION, EMULSION INTRAVENOUS AS NEEDED
Status: DISCONTINUED | OUTPATIENT
Start: 2024-04-15 | End: 2024-04-15

## 2024-04-15 RX ORDER — DROPERIDOL 2.5 MG/ML
0.62 INJECTION, SOLUTION INTRAMUSCULAR; INTRAVENOUS ONCE AS NEEDED
Status: DISCONTINUED | OUTPATIENT
Start: 2024-04-15 | End: 2024-04-15 | Stop reason: HOSPADM

## 2024-04-15 RX ORDER — HYDROMORPHONE HYDROCHLORIDE 1 MG/ML
0.2 INJECTION, SOLUTION INTRAMUSCULAR; INTRAVENOUS; SUBCUTANEOUS EVERY 5 MIN PRN
Status: DISCONTINUED | OUTPATIENT
Start: 2024-04-15 | End: 2024-04-15 | Stop reason: HOSPADM

## 2024-04-15 RX ORDER — OXYCODONE HYDROCHLORIDE 5 MG/1
5 TABLET ORAL EVERY 4 HOURS PRN
Status: DISCONTINUED | OUTPATIENT
Start: 2024-04-15 | End: 2024-04-15 | Stop reason: HOSPADM

## 2024-04-15 RX ORDER — HYDROMORPHONE HYDROCHLORIDE 1 MG/ML
0.5 INJECTION, SOLUTION INTRAMUSCULAR; INTRAVENOUS; SUBCUTANEOUS EVERY 5 MIN PRN
Status: DISCONTINUED | OUTPATIENT
Start: 2024-04-15 | End: 2024-04-15 | Stop reason: HOSPADM

## 2024-04-15 RX ORDER — MEPERIDINE HYDROCHLORIDE 25 MG/ML
12.5 INJECTION INTRAMUSCULAR; INTRAVENOUS; SUBCUTANEOUS EVERY 10 MIN PRN
Status: DISCONTINUED | OUTPATIENT
Start: 2024-04-15 | End: 2024-04-15 | Stop reason: HOSPADM

## 2024-04-15 RX ORDER — IODIXANOL 320 MG/ML
INJECTION, SOLUTION INTRAVASCULAR AS NEEDED
Status: DISCONTINUED | OUTPATIENT
Start: 2024-04-15 | End: 2024-04-15 | Stop reason: HOSPADM

## 2024-04-15 RX ORDER — MIDAZOLAM HYDROCHLORIDE 1 MG/ML
INJECTION INTRAMUSCULAR; INTRAVENOUS AS NEEDED
Status: DISCONTINUED | OUTPATIENT
Start: 2024-04-15 | End: 2024-04-15

## 2024-04-15 RX ORDER — FENTANYL CITRATE 50 UG/ML
INJECTION, SOLUTION INTRAMUSCULAR; INTRAVENOUS AS NEEDED
Status: DISCONTINUED | OUTPATIENT
Start: 2024-04-15 | End: 2024-04-15

## 2024-04-15 RX ORDER — ACETAMINOPHEN 325 MG/1
650 TABLET ORAL EVERY 4 HOURS PRN
Status: DISCONTINUED | OUTPATIENT
Start: 2024-04-15 | End: 2024-04-15 | Stop reason: HOSPADM

## 2024-04-15 RX ORDER — METHYLPREDNISOLONE 4 MG/1
TABLET ORAL
Qty: 21 TABLET | Refills: 0 | Status: SHIPPED | OUTPATIENT
Start: 2024-04-15 | End: 2024-04-22

## 2024-04-15 RX ORDER — SODIUM CHLORIDE, SODIUM LACTATE, POTASSIUM CHLORIDE, CALCIUM CHLORIDE 600; 310; 30; 20 MG/100ML; MG/100ML; MG/100ML; MG/100ML
100 INJECTION, SOLUTION INTRAVENOUS CONTINUOUS
Status: DISCONTINUED | OUTPATIENT
Start: 2024-04-15 | End: 2024-04-15 | Stop reason: HOSPADM

## 2024-04-15 RX ORDER — SODIUM TETRADECYL SULFATE 30 MG/ML
INJECTION, SOLUTION INTRAVENOUS CONTINUOUS PRN
Status: COMPLETED | OUTPATIENT
Start: 2024-04-15 | End: 2024-04-15

## 2024-04-15 RX ORDER — ONDANSETRON HYDROCHLORIDE 2 MG/ML
4 INJECTION, SOLUTION INTRAVENOUS ONCE AS NEEDED
Status: DISCONTINUED | OUTPATIENT
Start: 2024-04-15 | End: 2024-04-15 | Stop reason: HOSPADM

## 2024-04-15 RX ORDER — CEFAZOLIN 1 G/1
INJECTION, POWDER, FOR SOLUTION INTRAVENOUS AS NEEDED
Status: DISCONTINUED | OUTPATIENT
Start: 2024-04-15 | End: 2024-04-15

## 2024-04-15 RX ORDER — PHENYLEPHRINE HYDROCHLORIDE 10 MG/ML
INJECTION INTRAVENOUS AS NEEDED
Status: DISCONTINUED | OUTPATIENT
Start: 2024-04-15 | End: 2024-04-15

## 2024-04-15 RX ADMIN — MIDAZOLAM HYDROCHLORIDE 2 MG: 1 INJECTION, SOLUTION INTRAMUSCULAR; INTRAVENOUS at 10:25

## 2024-04-15 RX ADMIN — PHENYLEPHRINE HYDROCHLORIDE 80 MCG: 10 INJECTION INTRAVENOUS at 10:35

## 2024-04-15 RX ADMIN — FENTANYL CITRATE 100 MCG: 50 INJECTION, SOLUTION INTRAMUSCULAR; INTRAVENOUS at 10:29

## 2024-04-15 RX ADMIN — CEFAZOLIN 2 G: 1 INJECTION, POWDER, FOR SOLUTION INTRAMUSCULAR; INTRAVENOUS at 10:44

## 2024-04-15 RX ADMIN — ONDANSETRON 4 MG: 2 INJECTION INTRAMUSCULAR; INTRAVENOUS at 13:09

## 2024-04-15 RX ADMIN — Medication 3 L/MIN: at 13:25

## 2024-04-15 RX ADMIN — PROPOFOL 200 MG: 10 INJECTION, EMULSION INTRAVENOUS at 10:29

## 2024-04-15 RX ADMIN — SODIUM CHLORIDE, POTASSIUM CHLORIDE, SODIUM LACTATE AND CALCIUM CHLORIDE: 600; 310; 30; 20 INJECTION, SOLUTION INTRAVENOUS at 10:25

## 2024-04-15 RX ADMIN — HYDROMORPHONE HYDROCHLORIDE 0.5 MG: 1 INJECTION, SOLUTION INTRAMUSCULAR; INTRAVENOUS; SUBCUTANEOUS at 14:24

## 2024-04-15 RX ADMIN — LIDOCAINE HYDROCHLORIDE 100 MG: 20 INJECTION INTRAVENOUS at 10:29

## 2024-04-15 RX ADMIN — OXYCODONE HYDROCHLORIDE 5 MG: 5 TABLET ORAL at 15:05

## 2024-04-15 RX ADMIN — HYDROMORPHONE HYDROCHLORIDE 0.5 MG: 1 INJECTION, SOLUTION INTRAMUSCULAR; INTRAVENOUS; SUBCUTANEOUS at 13:58

## 2024-04-15 RX ADMIN — PHENYLEPHRINE HYDROCHLORIDE 80 MCG: 10 INJECTION INTRAVENOUS at 10:43

## 2024-04-15 ASSESSMENT — PAIN - FUNCTIONAL ASSESSMENT

## 2024-04-15 ASSESSMENT — PAIN SCALES - GENERAL
PAINLEVEL_OUTOF10: 8
PAINLEVEL_OUTOF10: 8
PAINLEVEL_OUTOF10: 0 - NO PAIN
PAINLEVEL_OUTOF10: 8
PAINLEVEL_OUTOF10: 0 - NO PAIN
PAINLEVEL_OUTOF10: 8
PAINLEVEL_OUTOF10: 0 - NO PAIN
PAINLEVEL_OUTOF10: 8
PAINLEVEL_OUTOF10: 0 - NO PAIN
PAINLEVEL_OUTOF10: 8

## 2024-04-15 ASSESSMENT — COLUMBIA-SUICIDE SEVERITY RATING SCALE - C-SSRS
2. HAVE YOU ACTUALLY HAD ANY THOUGHTS OF KILLING YOURSELF?: NO
1. IN THE PAST MONTH, HAVE YOU WISHED YOU WERE DEAD OR WISHED YOU COULD GO TO SLEEP AND NOT WAKE UP?: NO
6. HAVE YOU EVER DONE ANYTHING, STARTED TO DO ANYTHING, OR PREPARED TO DO ANYTHING TO END YOUR LIFE?: NO

## 2024-04-15 NOTE — ANESTHESIA POSTPROCEDURE EVALUATION
Patient: Armida PAREDES Lucille    Procedure Summary       Date: 04/15/24 Room / Location: [facility] OR 27 / [facility] [place] OR    Anesthesia Start: 1025 Anesthesia Stop: 1330    Procedure: VENOGRAM Diagnosis:       Pelvic pain      (Pelvic pain [R10.2])    Surgeons: Marianna Ordaz MD Responsible Provider: Eliazar Sevilla MD    Anesthesia Type: general ASA Status: 2            Anesthesia Type: general    Vitals Value Taken Time   /70 04/15/24 1415   Temp 36.1 °C (97 °F) 04/15/24 1325   Pulse 80 04/15/24 1415   Resp 11 04/15/24 1415   SpO2 100 % 04/15/24 1415       Anesthesia Post Evaluation    Patient location during evaluation: PACU  Patient participation: complete - patient participated  Level of consciousness: awake  Pain management: adequate  Airway patency: patent  Cardiovascular status: acceptable  Respiratory status: acceptable  Hydration status: acceptable  Postoperative Nausea and Vomiting: none        There were no known notable events for this encounter.

## 2024-04-15 NOTE — DISCHARGE INSTRUCTIONS
Post-operative Instructions    BRIEF OVERVIEW  Surgeon: Marianna Ordaz MD  Discharge Provider: Marianna Ordaz MD  Primary Care Physician at Discharge: Lorin Chung [phone]     Surgery Date: 4/15/2024       Surgery Details  You were taken to the operating room where you underwent an uncomplicated pelvic venography and bilateral emoblization (coil and sclerotherapy) .  You recovered from surgery well.      Call your doctor if you have any of the following:  A fever of 100.5 °F (38.0 °C) or higher  Pain, bloating, cramping, or tenderness associated with your incisions  Nausea or vomiting  Swelling around your wound  Pain on your wound that doesn't go away with medication  Bleeding from your incisions  Any of the following signs of wound infection:   Swelling  Increased pain  Warmth at the wound site  Drainage that looks like pus (thick and milky)    Diet  Resume your regular diet at home    Medications  - You should take tylenol 650mg every 6 hours for pain. You were also prescribed Medrol DosePak  - Also you should purchase Vein Formula (available on [employer]), take 1000mg per day (MPFF)  - You should take a stool softener to help you have at least one soft bowel movement daily.   - You may resume your previous medications unless otherwise instructed.     Activity  Go home today and rest.  You may resume normal activity tomorrow.  Do not operative any heavy machinery or make important decisions for the next 24 hours.    Wound Care  You have steri strips on your neck incision. These will fall off on their own in 10 days or so. The bandage can be removed 4/16. You can shower normally 4/16/24.    Outpatient Follow-Up  Future Appointments   Date Time Provider Department Center   5/6/2024  8:00 AM Elian Darby MD [id number]     Follow up with Dr. Ordaz in one month. Call the office to schedule      Contact  If you have any questions or to schedule an appointment, please call the vascular  surgery clinic phone line: [phone]

## 2024-04-15 NOTE — ANESTHESIA PREPROCEDURE EVALUATION
Patient: Armida Adkins    Procedure Information       Date/Time: 04/15/24 0955    Procedure: VENOGRAM - R jugular approach, pelvic venogram. 3% STS sclerotherapy    Location: [facility] OR 27 / [facility] OR    Surgeons: Marianna Ordaz MD            Relevant Problems   Anesthesia (within normal limits)      Cardiac   (+) AVNRT (AV kat re-entry tachycardia) (CMS-HCC)   (+) Atrial flutter (Multi)   (+) Supraventricular tachycardia (CMS-HCC)      Pulmonary   (+) Asthma, intermittent (HHS-HCC)      Neuro   (+) Carpal tunnel syndrome, bilateral upper limbs   (+) Carpal tunnel syndrome, left   (+) Carpal tunnel syndrome, right   (+) Depression      GI   (+) GERD (gastroesophageal reflux disease)   (+) Irritable bowel syndrome with constipation      Musculoskeletal   (+) Carpal tunnel syndrome, bilateral upper limbs   (+) Carpal tunnel syndrome, left   (+) Carpal tunnel syndrome, right   (+) Scoliosis      HEENT   (+) Acute sinusitis   (+) Bilateral sensorineural hearing loss   (+) Chronic sinusitis   (+) Seasonal allergies   (+) Sinus headache      GYN   (+) Abnormal uterine bleeding (AUB)   (+) Endometriosis       Clinical information reviewed:   Tobacco  Allergies  Meds   Med Hx  Surg Hx  OB Status  Fam Hx  Soc   Hx        NPO Detail:  NPO/Void Status  Date of Last Liquid: 04/15/24  Time of Last Liquid: 0000  Date of Last Solid: 04/14/24  Time of Last Solid: 2200  Last Intake Type: Clear fluids; Solid meal         PHYSICAL EXAM    Anesthesia Plan    History of general anesthesia?: yes  History of complications of general anesthesia?: no    ASA 2     general     intravenous induction   Anesthetic plan and risks discussed with patient.

## 2024-04-15 NOTE — PERIOPERATIVE NURSING NOTE
Dr. Sevilla was notified that the patient was falling asleep on 1mg of dialudid. Dr. Sevilla ordered 5mg Oxycodone to help with pain control and for the discharge ride home. Patient falls asleep easy but was waking up always saying she had pain.

## 2024-04-15 NOTE — ANESTHESIA PROCEDURE NOTES
Airway  Date/Time: 4/15/2024 10:32 AM  Urgency: elective    Airway not difficult    Staffing  Performed: TRA   Authorized by: Eliazar Sevilla MD    Performed by: TRA Mejia  Patient location during procedure: OR    Indications and Patient Condition  Indications for airway management: anesthesia  Spontaneous Ventilation: absent  Sedation level: deep  Preoxygenated: yes  MILS not maintained throughout  Mask difficulty assessment: 0 - not attempted  Planned trial extubation    Final Airway Details  Final airway type: supraglottic airway      Successful airway: classic  Size 3  Airway Seal Pressure (cm H2O): 15     Number of attempts at approach: 1

## 2024-04-15 NOTE — BRIEF OP NOTE
Date: 4/15/2024  OR Location: [facility] OR    Name: Armida Adkins, : 1990, Age: 33 y.o., MRN: [id number], Sex: female    Diagnosis  Pre-op Diagnosis     * Pelvic pain [R10.2] Post-op Diagnosis     * Pelvic pain [R10.2]     Procedures  VENOGRAM  05291 - CHG VENOGRAPHY CAVAL INFERIOR SERIALOGRAPHY RS&I    1 Right internal jugular ultrasound guided access  2 bilateral selective internal iliac vein venography  3 bilateral sclerotherapy (2cc STS made to foam total)  4 bilateral coil embolization of pelvic varicosities       Surgeons      * Marianna Ordaz - Primary    Resident/Fellow/Other Assistant:  Surgeons and Role:     * Macario Poole MD - Resident - Assisting    Procedure Summary  Anesthesia: Monitor Anesthesia Care  ASA: II  Anesthesia Staff: Anesthesiologist: Eliazar Sevilla MD  C-AA: TRA Mejia  Estimated Blood Loss: 5mL  Intra-op Medications: Administrations occurring from 0955 to 1240 on 04/15/24:  * No intraprocedure medications in log *           Anesthesia Record               Intraprocedure I/O Totals       None           Specimen: No specimens collected     Staff:   Circulator: Ronak Roblero RN; Velia Ruiz RN  Relief Circulator: Joy Huerta RN  Relief Scrub: Marshall Davis  Scrub Person: Adrian Carlos RN          Findings: bilateral pelvic varicosities, successful sclerotherapy and coil embolization    Complications:  None; patient tolerated the procedure well.     Disposition: PACU - hemodynamically stable.  Condition: stable  Specimens Collected: No specimens collected    Macario Poole MD, PhD  Vascular Surgery, PGY2  [id number]    Attending Attestation: I was present and scrubbed for the entire procedure.    Marianna Ordaz  Phone Number: [phone]

## 2024-04-15 NOTE — PERIOPERATIVE NURSING NOTE
Dr. Freed at the bedside to assess the pain in the patient's legs. Patient states mostly her heels hurt so the heels were elevated up off bed and support put under knees. No further interventions. Dr. Luna stated patient can go home but to call if pain gets worse.

## 2024-04-16 ASSESSMENT — PAIN SCALES - GENERAL: PAINLEVEL_OUTOF10: 8

## 2024-04-16 NOTE — OP NOTE
VENOGRAM Operative Note     Date: 4/15/2024  OR Location: [facility] OR    Name: Armida Adkins, : 1990, Age: 33 y.o., MRN: [id number], Sex: female    Diagnosis  Pre-op Diagnosis     * Pelvic pain [R10.2] Post-op Diagnosis     * Pelvic pain [R10.2]     Procedures  VENOGRAM  91328 - CHG VENOGRAPHY CAVAL INFERIOR SERIALOGRAPHY RS&I  US guided acces to the RIJ  Bilateral iliac and selective hypogastric vein venography (3rd order, selective)  Foam Sclerotherapy and coil embolization of Bilateral hypogastric veins anterior division and associated varicosities (3% STS; 5mm x 30cm coils 1/side)  Selective left renal vein, venography    Surgeons      * Marianna Ordaz - Primary    Resident/Fellow/Other Assistant:  Surgeons and Role:     * Macario Poole MD - Resident - Assisting    Procedure Summary  Anesthesia: Monitor Anesthesia Care  ASA: II  Anesthesia Staff: Anesthesiologist: Eliazar Sevilla MD  C-AA: TRA Mejia  Estimated Blood Loss: 10mL  Intra-op Medications: Administrations occurring from 0955 to 1240 on 04/15/24:  * No intraprocedure medications in log *           Anesthesia Record               Intraprocedure I/O Totals          Intake    LR infusion 800.00 mL    Total Intake 800 mL          Specimen: No specimens collected     Staff:   Circulator: Ronak Roblero RN; Velia Ruiz RN  Relief Circulator: Joy Huerta RN  Relief Scrub: Marshall Davis  Scrub Person: Adrian Carlos RN         Drains and/or Catheters: * None in log *    Tourniquet Times:         Implants:  Implants       Type Name Action Serial No.      Implant COIL, CHAYA, 5 X 30CM, STANDARD - SNA - [id number] Implanted NA     Implant COIL, CHAYA, 5 X 30CM, STANDARD - [id number] Implanted              Indications: Armida Adkins is an 33 y.o. female who is having surgery for Pelvic pain [R10.2].  She recently underwent left gonadal vein embolization with concomitant pelvic sclerotherapy of the periuterine and paraovarian plexus.   She had some worsening pain and an area of residual varicosities which were noted on her initial diagnostic venogram a few months ago.  This has persisted despite 2 months of interval time.  We presented today as a staged intervention for residual disease in hopes of contributing to some improved symptoms and quality of life.  We discussed the expectation related to improvement following today's procedure and that we would take a look at everything we worked on last time as well to assure that we had achieved the result we intended from the last intervention.    The patient was seen in the preoperative area. The risks, benefits, complications, treatment options, non-operative alternatives, expected recovery and outcomes were discussed with the patient. The possibilities of reaction to medication, pulmonary aspiration, injury to surrounding structures, bleeding, recurrent infection, the need for additional procedures, failure to diagnose a condition, and creating a complication requiring transfusion or operation were discussed with the patient. The patient concurred with the proposed plan, giving informed consent.  The site of surgery was properly noted/marked if necessary per policy. The patient has been actively warmed in preoperative area. Preoperative antibiotics have been ordered and given within 1 hours of incision. Venous thrombosis prophylaxis are not indicated.    Procedure Details: The patient was brought to the operating room.  She was placed in the operating table in supine position with her arms tucked.  Appropriate timeout huddle performed in the breath and medical record number.  She underwent LMA anesthesia.  We prepped and draped her right neck into the field in standard fashion.  She received 1 dose of antibiotics.    Using ultrasound-guided access and upright micropuncture needle we cannulated the right internal jugular vein.  We advanced our wire exchanged for a micropuncture sheath.  Confirmed  the wire with fluoroscopy.  We then remove the dilator and wire and advanced a stiff Glidewire down to the IVC under fluoroscopic guidance and upsized to a 5 Pashto 10 cm long sheath.  Remove the dilator.  Heparin flush the sheath.  And proceeded to do selective catheterization of the left renal vein with a Glidewire and a C2 catheter.  Venography of the left renal vein revealed that the previously treated left hypogastric vein was occluded.  There were no collaterals.  There was good emptying across the renal vein.    We then proceeded to focus on the pelvic work we cannulated the right iliac vein and subsequent hypogastric vein with an angled glide catheter and a Glidewire.  We ultimately made our way towards the anterior division of the hypogastric vein on the right and cannulated the varices of this division aiming towards the obturator vein as well as some of the varices towards the pelvic area.  We used venography to confirm our location.  We ultimately upsized to a 6 Pashto sheath that was 45 cm long.  We did so a wire a stiff Glidewire.  We wanted to first foam sclerotherapy the venous pelvis and varicosities and then we wanted to do that with balloon occlusion.  The coils which we had today required a lantern catheter and an 038 system.  So we did these in 2 parts.  First to foam the pelvis I advanced a 4 x 20 balloon angioplasty down to the confluence of the anterior division of the hypogastric vein.  And through this I placed an 014 CXI catheter into the pelvic varices  and proceeded to give 5 cc of 3% STS foam sclerotherapy through the CXI catheter.  This was performed after injecting contrast with the balloon up to get a sense of how much volume to give.  I gave contrast and then the foam and observed the stacking of contrast to assure it did not go systemically.  We allowed her to sit for a couple of minutes.  Then we allowed the balloon down.  Removed the CXI catheter advanced the wire and remove the  balloon angioplasty.  Ultimately exchanged for an angled guide catheter and through the angled glide catheter after removing the wire, we advanced the lantern catheter into place where we would land the coil on the way out.  We deployed a 5 mm x 30 cm penumbra coil into desired location with good apposition of the wall.  And follow-up venogram revealed no further reflux into the varicosities.  We then went up and over to the left side similarly cannulating the left common iliac vein into the left hypogastric vein and into the anterior division which we had seen robust varicosities on her diagnostic venogram several months ago.  In the same fashion we performed a foam sclerotherapy on the left side with balloon occlusion for a total of 5 cc of 3% Sotradecol.  Allowing it to sit with the balloon up for couple of minutes and on the way out used a 5 mm x 30 cm penumbra coil for occlusion.  Completion venogram showed a good result with no further contrast into the varicosities as well.    At this point out was completed.  We removed the catheters and sheaths from the right neck and held pressure for about 10 minutes with good hemostasis.  The patient was ultimately extubated.  She was taken to the PACU for recovery in stable condition.  I was present for the entire case.  All counts were correct.  There were no complications.    Complications:  None; patient tolerated the procedure well.    Disposition: PACU - hemodynamically stable.  Condition: stable       Attending Attestation: I was present and scrubbed for the entire procedure.    Marianna Ordaz  Phone Number: [phone]

## 2024-04-18 ENCOUNTER — TELEPHONE (OUTPATIENT)
Dept: VASCULAR SURGERY | Facility: HOSPITAL | Age: 34
End: 2024-04-18
Payer: COMMERCIAL

## 2024-04-18 NOTE — TELEPHONE ENCOUNTER
Patient called with symptoms of redness on her left hand, possibly where an IV was attempted at her recent surgical procedure.  No pain, swelling.  Picture was sent and shown to Dr. Ordaz who said doesn't look concerning, patient can try hydrocortisone cream if it starts to bother her.  Advised to call us if anything changes or worsens.  Patient verbalized understanding.

## 2024-04-18 NOTE — TELEPHONE ENCOUNTER
----- Message from Veronique Fajardo sent at 4/18/2024  9:07 AM EDT -----  Regarding: Medication Reaction  Good morning pt sees NP Breann London, Dr. Marianna Ordaz did a procedure on her recently and the pt states that she may be having an effect from the medication. I transferred her to the nurse line. Just asking someone in the office reach out to the pt about this matter. Thank you and have a nice day.

## 2024-04-19 NOTE — TELEPHONE ENCOUNTER
4/18 messaged was forwarded to Cindy Ramos RN to contact patient to gather more information and forward that information to Dr Ordaz.

## 2024-04-29 DIAGNOSIS — M54.50 LOW BACK PAIN, UNSPECIFIED BACK PAIN LATERALITY, UNSPECIFIED CHRONICITY, UNSPECIFIED WHETHER SCIATICA PRESENT: ICD-10-CM

## 2024-04-29 RX ORDER — CYCLOBENZAPRINE HCL 10 MG
TABLET ORAL
Qty: 30 TABLET | Refills: 0 | Status: SHIPPED | OUTPATIENT
Start: 2024-04-29

## 2024-05-05 ENCOUNTER — ANESTHESIA EVENT (OUTPATIENT)
Dept: GASTROENTEROLOGY | Facility: HOSPITAL | Age: 34
End: 2024-05-05

## 2024-05-05 RX ORDER — SODIUM CHLORIDE, SODIUM LACTATE, POTASSIUM CHLORIDE, CALCIUM CHLORIDE 600; 310; 30; 20 MG/100ML; MG/100ML; MG/100ML; MG/100ML
20 INJECTION, SOLUTION INTRAVENOUS CONTINUOUS
OUTPATIENT
Start: 2024-05-05

## 2024-05-05 NOTE — ANESTHESIA PREPROCEDURE EVALUATION
Patient: Armida Adkins    Procedure Information       Date/Time: 05/06/24 0800    Scheduled providers: Elian Darby MD; Kristina Patrick MD    Procedure: COLONOSCOPY    Location: [facility]            Relevant Problems   Cardiac   (+) AVNRT (AV kat re-entry tachycardia) (CMS-HCC)   (+) Atrial flutter (Multi)   (+) Supraventricular tachycardia (CMS-HCC)      Pulmonary   (+) Asthma, intermittent (HHS-HCC)      Neuro   (+) Carpal tunnel syndrome, bilateral upper limbs   (+) Carpal tunnel syndrome, left   (+) Carpal tunnel syndrome, right   (+) Depression      GI   (+) GERD (gastroesophageal reflux disease)   (+) Irritable bowel syndrome with constipation      Musculoskeletal   (+) Carpal tunnel syndrome, bilateral upper limbs   (+) Carpal tunnel syndrome, left   (+) Carpal tunnel syndrome, right   (+) Scoliosis      HEENT   (+) Acute sinusitis   (+) Bilateral sensorineural hearing loss   (+) Chronic sinusitis   (+) Seasonal allergies   (+) Sinus headache      GYN   (+) Abnormal uterine bleeding (AUB)   (+) Endometriosis     4/15/24  Patient had Foam Sclerotherapy and coil embolization of Bilateral hypogastric veins anterior division and associated varicosities   Clinical information reviewed:                   NPO Detail:  No data recorded     PHYSICAL EXAM    Anesthesia Plan

## 2024-05-06 ENCOUNTER — APPOINTMENT (OUTPATIENT)
Dept: GASTROENTEROLOGY | Facility: HOSPITAL | Age: 34
End: 2024-05-06
Payer: COMMERCIAL

## 2024-05-06 ENCOUNTER — ANESTHESIA (OUTPATIENT)
Dept: GASTROENTEROLOGY | Facility: HOSPITAL | Age: 34
End: 2024-05-06
Payer: COMMERCIAL

## 2024-05-07 ENCOUNTER — APPOINTMENT (OUTPATIENT)
Dept: CARDIOLOGY | Facility: CLINIC | Age: 34
End: 2024-05-07
Payer: COMMERCIAL

## 2024-05-08 ENCOUNTER — HOSPITAL ENCOUNTER (EMERGENCY)
Facility: HOSPITAL | Age: 34
Discharge: HOME | End: 2024-05-08
Payer: COMMERCIAL

## 2024-05-08 VITALS
RESPIRATION RATE: 17 BRPM | TEMPERATURE: 98 F | HEIGHT: 65 IN | BODY MASS INDEX: 22.99 KG/M2 | WEIGHT: 138 LBS | HEART RATE: 88 BPM | SYSTOLIC BLOOD PRESSURE: 117 MMHG | DIASTOLIC BLOOD PRESSURE: 72 MMHG | OXYGEN SATURATION: 100 %

## 2024-05-08 DIAGNOSIS — M89.8X1 PAIN OF RIGHT SCAPULA: Primary | ICD-10-CM

## 2024-05-08 PROCEDURE — 2500000004 HC RX 250 GENERAL PHARMACY W/ HCPCS (ALT 636 FOR OP/ED): Performed by: PHYSICIAN ASSISTANT

## 2024-05-08 PROCEDURE — 96372 THER/PROPH/DIAG INJ SC/IM: CPT | Performed by: PHYSICIAN ASSISTANT

## 2024-05-08 PROCEDURE — 99283 EMERGENCY DEPT VISIT LOW MDM: CPT

## 2024-05-08 RX ORDER — ACETAMINOPHEN 160 MG/5ML
650 LIQUID ORAL EVERY 4 HOURS PRN
Qty: 120 ML | Refills: 0 | Status: SHIPPED | OUTPATIENT
Start: 2024-05-08 | End: 2024-05-18

## 2024-05-08 RX ORDER — TRIPROLIDINE/PSEUDOEPHEDRINE 2.5MG-60MG
600 TABLET ORAL EVERY 6 HOURS PRN
Qty: 840 ML | Refills: 0 | Status: SHIPPED | OUTPATIENT
Start: 2024-05-08 | End: 2024-05-15

## 2024-05-08 RX ORDER — KETOROLAC TROMETHAMINE 30 MG/ML
15 INJECTION, SOLUTION INTRAMUSCULAR; INTRAVENOUS ONCE
Status: COMPLETED | OUTPATIENT
Start: 2024-05-08 | End: 2024-05-08

## 2024-05-08 RX ORDER — LIDOCAINE 560 MG/1
1 PATCH PERCUTANEOUS; TOPICAL; TRANSDERMAL DAILY
Qty: 5 PATCH | Refills: 0 | Status: SHIPPED | OUTPATIENT
Start: 2024-05-08 | End: 2024-05-13

## 2024-05-08 RX ORDER — PREDNISONE 5 MG/ML
40 SOLUTION ORAL DAILY
Qty: 200 ML | Refills: 0 | Status: SHIPPED | OUTPATIENT
Start: 2024-05-08 | End: 2024-05-13

## 2024-05-08 RX ORDER — DIAZEPAM ORAL SOLUTION (CONCENTRATE) 5 MG/ML
5 SOLUTION ORAL NIGHTLY PRN
Qty: 3 ML | Refills: 0 | Status: SHIPPED | OUTPATIENT
Start: 2024-05-08 | End: 2024-05-11

## 2024-05-08 RX ADMIN — KETOROLAC TROMETHAMINE 15 MG: 30 INJECTION, SOLUTION INTRAMUSCULAR; INTRAVENOUS at 09:35

## 2024-05-08 ASSESSMENT — PAIN SCALES - GENERAL
PAINLEVEL_OUTOF10: 10 - WORST POSSIBLE PAIN
PAINLEVEL_OUTOF10: 8
PAINLEVEL_OUTOF10: 0 - NO PAIN

## 2024-05-08 ASSESSMENT — PAIN - FUNCTIONAL ASSESSMENT: PAIN_FUNCTIONAL_ASSESSMENT: 0-10

## 2024-05-08 ASSESSMENT — PAIN DESCRIPTION - LOCATION
LOCATION: NECK
LOCATION: NECK

## 2024-05-08 ASSESSMENT — PAIN DESCRIPTION - ORIENTATION: ORIENTATION: RIGHT

## 2024-05-08 NOTE — Clinical Note
Armida Adkins was seen and treated in our emergency department on 5/8/2024.  She may return to work on 05/11/2024.       If you have any questions or concerns, please don't hesitate to call.      Ade Frost PA-C

## 2024-05-08 NOTE — ED TRIAGE NOTES
C/O RIGHT SIDED NECK PAIN ONSET 12 DAYS, DENIES KNOWN INJURY, INCREASED PAIN WITH ROM, LIMITED ROM, PT HAS BEEN TAKING MUSCLE RELAXER WITH NO RELIEF, AMBULATED TO TRIAGE GAIT STEADY, DENIES TAKING OTC MEDS TODAY

## 2024-05-08 NOTE — ED PROVIDER NOTES
HPI     CC: Neck Pain     HPI: Armida Adkins is a 33 y.o. female with past medical history of fibromyalgia, recurrent right shoulder muscle spasms, atrial flutter treated surgically, asthma, and pelvic pain managed by vascular presents with concern for recurrent right-sided muscle spasms.  Patient states that they started last Friday when she woke up from sleeping.  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  She reports that she has been taking Flexeril as prescribed by her primary care doctor which normally helps this condition.  She states she has never seen a specialist but is often referred to physical therapy which does help temporarily.  She does report that if she has some tingling in her fingertips which is normal when she has this condition.  She has not tried any NSAIDs or Tylenol.  She denies chance of pregnancy.  She reports no fevers, chills, swelling, neck pain, or headaches.  She did have a recent pelvic coil embolization for chronic pelvic pain and vascular congestion and the access was of the right internal jugular but she reports no swelling, redness, or warmth to this area.  This occurred on 4/15.    ROS: 10-point review of systems was performed and is otherwise negative except as noted in HPI.      Past Medical History: Noncontributory except per HPI     Past Surgical History: Noncontributory except per HPI     Family History: Reviewed and noncontributory     Social History:  Noncontributory except per HPI       Allergies   Allergen Reactions    Benzonatate Unknown    Coconut Itching    Erythromycin-Benzoyl Peroxide Unknown    Fruit Flavor Unknown    Hydrocortisone Unknown    Peach Itching    Pineapple Itching    Pollen Extracts Unknown    Sulfamethoxazole-Trimethoprim Swelling       Home Meds:   Current Outpatient Medications   Medication Instructions    acetaminophen (TYLENOL)  "650 mg, oral, Every 4 hours PRN    albuterol 90 mcg/actuation inhaler 1-2 puffs, inhalation, Every 4 hours PRN    albuterol 2.5 mg, inhalation, Every 4 hours PRN    cyclobenzaprine (Flexeril) 10 mg tablet TAKE ONE TABLET BY MOUTH AS NEEDED AT BEDTIME FOR MUSLE SPASMS    diazePAM (VALIUM) 5 mg, oral, Nightly PRN    estradiol (Estrace) 0.01 % (0.1 mg/gram) vaginal cream PLACE A PEA SIZED AMOUNT ON YOUR FINGER AND APPLY TO SORE AREA OF VULVA. USE NIGHTLY FOR ONE MONTH.    etonogestreL-ethinyl estradioL (Nuvaring) 0.12-0.015 mg/24 hr vaginal ring vaginal    hydrocortisone 2.5 % cream Apply twice daily to affected areas    ibuprofen 600 mg, oral, Every 6 hours PRN    lactulose 10 g, oral, 2 times daily PRN    lidocaine (AsperFlex, lidocaine,) 4 % patch 1 patch, transdermal, Daily, Remove & discard patch within 12 hours or as directed by MD.    mometasone (Nasonex) 50 mcg/actuation nasal spray 2 sprays, Each Nostril, 2 times daily    naproxen (Naprosyn) 500 mg tablet TAKE ONE TABLET BY MOUTH TWO TIMES A DAY AS NEEDED FOR MILD PAIN    predniSONE 40 mg, oral, Daily        ED Triage Vitals [05/08/24 0859]   Temperature Heart Rate Respirations BP   36.6 °C (97.8 °F) 90 17 111/83      Pulse Ox Temp src Heart Rate Source Patient Position   100 % -- -- --      BP Location FiO2 (%)     -- --         Heart Rate:  [90]   Temperature:  [36.6 °C (97.8 °F)]   Respirations:  [17]   BP: (111)/(83)   Height:  [164 cm (5' 4.57\")]   Weight:  [62.6 kg (138 lb)]   Pulse Ox:  [100 %]      Physical Exam:  Physical Exam  Constitutional:       General: She is not in acute distress.     Appearance: Normal appearance. She is not ill-appearing.   HENT:      Head: Normocephalic and atraumatic.      Right Ear: External ear normal.      Left Ear: External ear normal.      Nose: Nose normal.      Mouth/Throat:      Mouth: Mucous membranes are moist.   Eyes:      Extraocular Movements: Extraocular movements intact.      Conjunctiva/sclera: Conjunctivae " normal.      Pupils: Pupils are equal, round, and reactive to light.   Cardiovascular:      Rate and Rhythm: Normal rate and regular rhythm.      Pulses: Normal pulses.   Pulmonary:      Effort: Pulmonary effort is normal. No respiratory distress.      Breath sounds: Normal breath sounds.   Abdominal:      General: Abdomen is flat.      Palpations: Abdomen is soft.      Tenderness: There is no abdominal tenderness.   Musculoskeletal:      Cervical back: Normal range of motion and neck supple.      Comments: No right anterior shoulder tenderness to palpation.  No cervical or thoracic midline bony tenderness.  No tenderness of the anterior soft tissue of the neck on the right or left side.  Range of motion of the neck is intact.  Range of motion of the right shoulder is intact.  Localized area of pain is along the medial border of the scapula at the superior aspect.  Made worse with placing hand behind the small of her back with some scapular winging noted.  Able to place arms above head without issue.  Strength and sensation is normal to the upper extremities.  No overlying redness, warmth, or rashes.   Skin:     General: Skin is warm and dry.      Capillary Refill: Capillary refill takes less than 2 seconds.   Neurological:      General: No focal deficit present.      Mental Status: She is alert and oriented to person, place, and time.   Psychiatric:         Mood and Affect: Mood normal.          Diagnostic Results        Labs Reviewed - No data to display      No orders to display                 [place] Coma Scale Score: 15                  Procedure  Procedures    ED Course & MDM   Assessment/Plan:     Medications   ketorolac (Toradol) injection 15 mg (has no administration in time range)        Diagnoses as of 05/08/24 0952   Pain of right scapula       Medical Decision Making    Armida Adkins is a 33 y.o. female with past medical history of fibromyalgia, recurrent right shoulder muscle spasms, atrial flutter  treated surgically, asthma, and pelvic pain managed by vascular presents with concern for recurrent right-sided muscle spasms.  Patient is nontoxic-appearing and her vital signs are normal.  She has no red flag symptoms on exam or based on history.  Based on her presentation and history of recurrent right-sided muscle spasms, differential diagnosis includes impingement of the dorsal scapular nerve on the right side or strained muscle between paraspinal and right scapula.  Since she has had no direct injury and no changes to her symptoms throughout this time, do not feel that imaging is required.  Again, she has no red flag symptoms.  She has not tried any NSAIDs, so we will give 15 mg IM Toradol in the emergency department.  Feel that she will most benefit from seeing a shoulder specialist and short course of steroids to decrease inflammation since she has already tried significant muscle relaxers.  Discussed this plan of care with patient who agrees with this plan of care.    Muscle strain or nerve impingement: Patient was educated about her findings on physical exam.  Given that she has not had any acute changes over the last 5 days, has no red flag symptoms, and has had this exact pain and symptoms in the past, do feel that she could still be treated as an outpatient.  Will give Tylenol, Motrin, Asper flex patch, steroids, and low-dose nightly Valium to try.  She already has Flexeril at home that she could take during the daytime, but do not recommend taking more than prescribed with Valium as it can be addictive and impair judgment.  Patient is agreeable to trying this medication.  She has had it before.  I have also ordered an orthopedic follow-up to meet with a shoulder specialist for further testing/treatment plan.  Patient was written off of work for the next few days to allow this area to heal.  We thoroughly discussed return precautions including new fever, chills, redness, warmth, decreased range of  motion, neck pain, headaches, or any other concerning symptoms.  Patient agreeable to plan of care and felt comfortable returning home.    Disposition: Home    ED Prescriptions       Medication Sig Dispense Start Date End Date Auth. Provider    lidocaine (AsperFlex, lidocaine,) 4 % patch Place 1 patch over 12 hours on the skin once daily for 5 days. Remove & discard patch within 12 hours or as directed by MD. 5 patch 5/8/2024 5/13/2024 Ade Frost PA-C    predniSONE 5 mg/5 mL solution Take 40 mL (40 mg) by mouth once daily for 5 days. 200 mL 5/8/2024 5/13/2024 Ade GLENNY Frost PA-C    acetaminophen (Tylenol) 160 mg/5 mL liquid Take 20.3 mL (650 mg) by mouth every 4 hours if needed for mild pain (1 - 3) or moderate pain (4 - 6) for up to 10 days. 120 mL 5/8/2024 5/18/2024 Ade Frost PA-C    ibuprofen 100 mg/5 mL suspension Take 30 mL (600 mg) by mouth every 6 hours if needed for mild pain (1 - 3) or moderate pain (4 - 6) for up to 7 days. 840 mL 5/8/2024 5/15/2024 Ade Frost PA-C    diazePAM (Valium) 5 mg/mL solution Take 1 mL (5 mg) by mouth as needed at bedtime for muscle spasms for up to 3 days. 3 mL 5/8/2024 5/11/2024 Ade Frost PA-C            Social Determinants Affecting Care: none     Ade Frost PA-C    This note was dictated by speech recognition. Minor errors in transcription may be present.     Ade Frost PA-C  05/08/24 0974

## 2024-05-10 ENCOUNTER — OFFICE VISIT (OUTPATIENT)
Dept: ORTHOPEDIC SURGERY | Facility: HOSPITAL | Age: 34
End: 2024-05-10
Payer: COMMERCIAL

## 2024-05-10 ENCOUNTER — HOSPITAL ENCOUNTER (OUTPATIENT)
Dept: RADIOLOGY | Facility: HOSPITAL | Age: 34
Discharge: HOME | End: 2024-05-10
Payer: COMMERCIAL

## 2024-05-10 VITALS — BODY MASS INDEX: 23.56 KG/M2 | HEIGHT: 64 IN | WEIGHT: 138 LBS

## 2024-05-10 DIAGNOSIS — M25.511 RIGHT SHOULDER PAIN, UNSPECIFIED CHRONICITY: ICD-10-CM

## 2024-05-10 PROCEDURE — 2500000005 HC RX 250 GENERAL PHARMACY W/O HCPCS: Performed by: ORTHOPAEDIC SURGERY

## 2024-05-10 PROCEDURE — 99214 OFFICE O/P EST MOD 30 MIN: CPT | Performed by: ORTHOPAEDIC SURGERY

## 2024-05-10 PROCEDURE — 73030 X-RAY EXAM OF SHOULDER: CPT | Mod: RT

## 2024-05-10 PROCEDURE — 20610 DRAIN/INJ JOINT/BURSA W/O US: CPT | Mod: RT | Performed by: ORTHOPAEDIC SURGERY

## 2024-05-10 PROCEDURE — 73030 X-RAY EXAM OF SHOULDER: CPT | Mod: RIGHT SIDE | Performed by: RADIOLOGY

## 2024-05-10 PROCEDURE — 1036F TOBACCO NON-USER: CPT | Performed by: ORTHOPAEDIC SURGERY

## 2024-05-10 PROCEDURE — 2500000004 HC RX 250 GENERAL PHARMACY W/ HCPCS (ALT 636 FOR OP/ED): Performed by: ORTHOPAEDIC SURGERY

## 2024-05-10 RX ORDER — TRIAMCINOLONE ACETONIDE 40 MG/ML
40 INJECTION, SUSPENSION INTRA-ARTICULAR; INTRAMUSCULAR
Status: COMPLETED | OUTPATIENT
Start: 2024-05-10 | End: 2024-05-10

## 2024-05-10 RX ORDER — LIDOCAINE HYDROCHLORIDE 10 MG/ML
4 INJECTION INFILTRATION; PERINEURAL
Status: COMPLETED | OUTPATIENT
Start: 2024-05-10 | End: 2024-05-10

## 2024-05-10 RX ADMIN — LIDOCAINE HYDROCHLORIDE 4 ML: 10 INJECTION, SOLUTION INFILTRATION; PERINEURAL at 10:51

## 2024-05-10 RX ADMIN — TRIAMCINOLONE ACETONIDE 40 MG: 400 INJECTION, SUSPENSION INTRA-ARTICULAR; INTRAMUSCULAR at 10:51

## 2024-05-10 NOTE — PROGRESS NOTES
Here for evaluation of her right shoulder she has right shoulder pain and spasm worse with elevation and leaning on it at nighttime.  She has had physical therapy and has not helped.  She has taken muscle relaxants which have helped somewhat.    The patient is pleasant and cooperative.  The patient is alert and oriented ×3.  Auditory function is intact.  The patient is a good historian.  The patient is not in acute distress.  Eye exam significant for nonicteric sclera, intact ocular muscle movement.  Breathing is rhythmic symmetric and nonlabored.  Patient is good cervical range of motion her radial pulses easily palpable symmetric bilaterally her  strength is 5/5 bilaterally light touch sensation is intact over both upper extremities right upper extremity integument is intact with exception of tattoos otherwise no laceration abrasions or contusions.  Active shoulder motion elevation 180 external rotation and abduction 90+ with this pain.  No apprehension.  Internal rotation and abduction 70 degrees with pain.  Motor power abduction internal/external rotation 5/5.    Right shoulder x-rays were obtained and they were reviewed with the patient the subacromial glenohumeral spaces are well-preserved no fracture dislocation subluxation or arthritic degenerative changes noted.    Right shoulder impingement    We discussed options for treatment I recommended injections next treatment step injection was performed tolerated well patient has been asked to call next week.  If she does not get relief I recommend an MRI scan of the right shoulder.    This was dictated using voice recognition software and not corrected for grammatical or spelling errors.  L Inj/Asp: R subacromial bursa on 5/10/2024 10:51 AM  Indications: pain  Details: 22 G needle, posterior approach  Medications: 40 mg triamcinolone acetonide 40 mg/mL; 4 mL lidocaine 10 mg/mL (1 %)  Outcome: tolerated well, no immediate complications  Procedure, treatment  alternatives, risks and benefits explained, specific risks discussed. Consent was given by the patient. Immediately prior to procedure a time out was called to verify the correct patient, procedure, equipment, support staff and site/side marked as required.

## 2024-05-14 ENCOUNTER — OFFICE VISIT (OUTPATIENT)
Dept: VASCULAR SURGERY | Facility: CLINIC | Age: 34
End: 2024-05-14
Payer: COMMERCIAL

## 2024-05-14 VITALS
HEIGHT: 64 IN | SYSTOLIC BLOOD PRESSURE: 108 MMHG | BODY MASS INDEX: 25.27 KG/M2 | WEIGHT: 148 LBS | DIASTOLIC BLOOD PRESSURE: 69 MMHG | OXYGEN SATURATION: 97 % | HEART RATE: 97 BPM

## 2024-05-14 DIAGNOSIS — N94.89 PELVIC CONGESTION SYNDROME: Primary | ICD-10-CM

## 2024-05-14 PROCEDURE — 99214 OFFICE O/P EST MOD 30 MIN: CPT | Performed by: NURSE PRACTITIONER

## 2024-05-14 ASSESSMENT — PAIN SCALES - GENERAL: PAINLEVEL: 4

## 2024-05-14 NOTE — PROGRESS NOTES
F/U REASON: pelvic congestion     CURRENT ENCOUNTER:  Armida Adkins is 33 y.o. female here for follow up of pelvic congestion.    She is feeling 50-60% better today. She is still having some pain with sex mostly on insertion, although pain is much less. Denies flank pain.    Meds:     Current Outpatient Medications:     acetaminophen (Tylenol) 160 mg/5 mL liquid, Take 20.3 mL (650 mg) by mouth every 4 hours if needed for mild pain (1 - 3) or moderate pain (4 - 6) for up to 10 days., Disp: 120 mL, Rfl: 0    albuterol 2.5 mg /3 mL (0.083 %) nebulizer solution, Inhale 3 mL (2.5 mg) every 4 hours if needed for wheezing., Disp: , Rfl:     albuterol 90 mcg/actuation inhaler, Inhale 1-2 puffs every 4 hours if needed for shortness of breath., Disp: 18 g, Rfl: 2    cyclobenzaprine (Flexeril) 10 mg tablet, TAKE ONE TABLET BY MOUTH AS NEEDED AT BEDTIME FOR MUSLE SPASMS, Disp: 30 tablet, Rfl: 0    estradiol (Estrace) 0.01 % (0.1 mg/gram) vaginal cream, PLACE A PEA SIZED AMOUNT ON YOUR FINGER AND APPLY TO SORE AREA OF VULVA. USE NIGHTLY FOR ONE MONTH., Disp: , Rfl:     etonogestreL-ethinyl estradioL (Nuvaring) 0.12-0.015 mg/24 hr vaginal ring, Insert into the vagina., Disp: , Rfl:     hydrocortisone 2.5 % cream, Apply twice daily to affected areas, Disp: , Rfl:     ibuprofen 100 mg/5 mL suspension, Take 30 mL (600 mg) by mouth every 6 hours if needed for mild pain (1 - 3) or moderate pain (4 - 6) for up to 7 days., Disp: 840 mL, Rfl: 0    lactulose 10 gram/15 mL (15 mL) solution, Take 10 g by mouth 2 times a day as needed (constipation)., Disp: 1000 mL, Rfl: 3    mometasone (Nasonex) 50 mcg/actuation nasal spray, ADMINISTER 2 SPRAYS INTO EACH NOSTRIL 2 TIMES A DAY., Disp: 17 g, Rfl: 2    naproxen (Naprosyn) 500 mg tablet, TAKE ONE TABLET BY MOUTH TWO TIMES A DAY AS NEEDED FOR MILD PAIN, Disp: 60 tablet, Rfl: 0    diazePAM (Valium) 5 mg/mL solution, Take 1 mL (5 mg) by mouth as needed at bedtime for muscle spasms for up to 3  days., Disp: 3 mL, Rfl: 0    Allergies:   Allergies   Allergen Reactions    Benzonatate Unknown    Coconut Itching    Erythromycin-Benzoyl Peroxide Unknown    Fruit Flavor Unknown    Peach Itching    Pineapple Itching    Pollen Extracts Unknown    Sulfamethoxazole-Trimethoprim Swelling     ROS:  Review of Systems   Constitutional: Negative.    HENT: Negative.     Eyes: Negative.    Respiratory: Negative.  Negative for shortness of breath.    Cardiovascular:  Negative for chest pain and palpitations.   Gastrointestinal: Negative.    Endocrine: Negative.    Genitourinary:  Positive for dyspareunia, menstrual problem, pelvic pain and vaginal pain.   Musculoskeletal: Negative.    Skin: Negative.    Allergic/Immunologic: Negative.    Neurological: Negative.    Psychiatric/Behavioral: Negative.        Objective:  Vitals:  Vitals:    05/14/24 1404   BP: 108/69   Pulse:    SpO2:       Physical Exam  Constitutional:       Appearance: Normal appearance.   HENT:      Head: Normocephalic and atraumatic.      Nose: Nose normal.      Mouth/Throat:      Mouth: Mucous membranes are moist.   Eyes:      Conjunctiva/sclera: Conjunctivae normal.   Cardiovascular:      Rate and Rhythm: Normal rate.      Pulses: Normal pulses.   Pulmonary:      Effort: Pulmonary effort is normal.   Abdominal:      General: Abdomen is flat.   Musculoskeletal:         General: Normal range of motion.      Cervical back: Normal range of motion.   Skin:     General: Skin is warm and dry.      Capillary Refill: Capillary refill takes less than 2 seconds.   Neurological:      General: No focal deficit present.      Mental Status: She is alert and oriented to person, place, and time.   Psychiatric:         Mood and Affect: Mood normal.         Behavior: Behavior normal.         Thought Content: Thought content normal.         Judgment: Judgment normal.         Labs:  Lab Results   Component Value Date    WBC 7.7 04/11/2024    WBC 7.0 02/13/2024    WBC 8.0  11/30/2023    HGB 12.2 04/11/2024    HGB 12.5 02/13/2024    HGB 12.9 11/30/2023    HCT 37.8 04/11/2024    HCT 37.8 02/13/2024    HCT 38.9 11/30/2023    MCV 84 04/11/2024    MCV 82 02/13/2024    MCV 83 11/30/2023     04/11/2024     Lab Results   Component Value Date    CREATININE 0.79 04/11/2024    CREATININE 0.74 02/13/2024    CREATININE 0.83 11/30/2023    BUN 7 04/11/2024    BUN 6 02/13/2024    BUN 8 11/30/2023     04/11/2024     02/13/2024     11/30/2023    K 4.0 04/11/2024    K 3.9 02/13/2024    K 4.0 11/30/2023     04/11/2024     02/13/2024     11/30/2023    CO2 26 04/11/2024    CO2 26 02/13/2024    CO2 27 11/30/2023       Assessment & Plan:  Armida Adkins is 33 y.o. female with pelvic pain, intermittent on the left, with L flank pain.      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 Sanchez 2/9/2023. Pt states that she has been experience intermittent left flank pain and pelvic pain for approximately three years The pelvic pain leads to significant dyspareunia. Pt also experiences left abdominal pain during intercourse that is severe and bloating with abdominal pain during menses. No alleviating or aggravating pain. The pain is intermittent.  Describes it as sharp and severe.  She underwent a venogram which demonstrated a pressure differential between the left renal vein and IVC of 4 mmHg  After the procedure the pt went into afib with RVR and underwent cardioversion, was on Eliquis until May 2023      + left flank pain at random times, no hematuria, + abdominal bloating     12/11/23: ULTRASOUND RIGHT JUGULAR VEIN ACCESS, LEFT RENAL VEIN, BILATERAL ILIOFEMORAL VENOGRAPHY, AND IVUS LEFT GONADAL AND PELVIC VENOGRAM      2/19/24 RIGHT JUGULAR ACCESS WITH FOAM SCLEROTHERAPY, EMBOLIZATION OF GONADAL VEIN with Dr. Marianna Ordaz    4/15/24: Venogram, R jugular approach, pelvic venogram. 3% STS sclerotherapy w/Orlin      Today's plan is as follows:  1) Continue to take vein formula, OTC MPFF  2) Follow-up in 3 months with us    Breann London, SUMA, APRN-CNP, AGPCNP-C, FNP-C, AGACHEOP-BC  Senior Nurse Practitioner, Vascular Surgery  [facility], [facility] [place]  [address], Office [phone]

## 2024-05-14 NOTE — PATIENT INSTRUCTIONS
It was a pleasure taking care of you today and appreciate your seeing us at our [facility].     Today's plan is as follows:  1) Continue to take vein formula, OTC MPFF  2) Follow-up in 3 months with us    Please call the office with any questions at [phone].   You can speak to our secretaries or our clinical nurses for specific questions.   For [facility] specific questions, you can also call [phone] or email at [email or web address]  If you need coordinating your appointments and testing you can do these at the  or by calling my office shortly after your visit.

## 2024-05-15 ASSESSMENT — ENCOUNTER SYMPTOMS
GASTROINTESTINAL NEGATIVE: 1
MUSCULOSKELETAL NEGATIVE: 1
NEUROLOGICAL NEGATIVE: 1
PSYCHIATRIC NEGATIVE: 1
ALLERGIC/IMMUNOLOGIC NEGATIVE: 1
ENDOCRINE NEGATIVE: 1
SHORTNESS OF BREATH: 0
RESPIRATORY NEGATIVE: 1
CONSTITUTIONAL NEGATIVE: 1
EYES NEGATIVE: 1
PALPITATIONS: 0

## 2024-05-20 DIAGNOSIS — Z30.019 ENCOUNTER FOR FEMALE BIRTH CONTROL: Primary | ICD-10-CM

## 2024-05-20 RX ORDER — ETONOGESTREL AND ETHINYL ESTRADIOL VAGINAL RING .015; .12 MG/D; MG/D
1 RING VAGINAL
Qty: 1 EACH | Refills: 12 | Status: SHIPPED | OUTPATIENT
Start: 2024-05-20

## 2024-06-06 ENCOUNTER — APPOINTMENT (OUTPATIENT)
Dept: OBSTETRICS AND GYNECOLOGY | Facility: CLINIC | Age: 34
End: 2024-06-06
Payer: COMMERCIAL

## 2024-06-10 ENCOUNTER — OFFICE VISIT (OUTPATIENT)
Dept: ORTHOPEDIC SURGERY | Facility: HOSPITAL | Age: 34
End: 2024-06-10
Payer: COMMERCIAL

## 2024-06-10 DIAGNOSIS — M75.101 TEAR OF RIGHT ROTATOR CUFF, UNSPECIFIED TEAR EXTENT, UNSPECIFIED WHETHER TRAUMATIC: ICD-10-CM

## 2024-06-10 PROCEDURE — 99212 OFFICE O/P EST SF 10 MIN: CPT | Performed by: ORTHOPAEDIC SURGERY

## 2024-06-10 NOTE — PROGRESS NOTES
Patient continues to have pain in her right shoulder despite subacromial injection injection generally helpful.  She continues to have pain on the supra aspect of her shoulder worse with elevation.    Right shoulder exam manifest full range of motion full motor power in abduction internal/external rotation.  There is tenderness over the acromioclavicular joint.  There is no apprehension.  There is pain at the extremes of the internal rotation abduction arc.    Right shoulder pain recalcitrant to treatment.    Options for further evaluation and were obtained and differential diagnosis was discussed.  I recommended further evaluation by MRI scan.  Follow-up after obtained.    This was dictated using voice recognition software and not corrected for grammatical or spelling errors.

## 2024-06-24 ENCOUNTER — HOSPITAL ENCOUNTER (OUTPATIENT)
Dept: RADIOLOGY | Facility: HOSPITAL | Age: 34
Discharge: HOME | End: 2024-06-24
Payer: COMMERCIAL

## 2024-06-24 DIAGNOSIS — M75.101 TEAR OF RIGHT ROTATOR CUFF, UNSPECIFIED TEAR EXTENT, UNSPECIFIED WHETHER TRAUMATIC: ICD-10-CM

## 2024-06-24 PROCEDURE — 73221 MRI JOINT UPR EXTREM W/O DYE: CPT | Mod: RIGHT SIDE | Performed by: RADIOLOGY

## 2024-06-24 PROCEDURE — 73221 MRI JOINT UPR EXTREM W/O DYE: CPT | Mod: RT

## 2024-06-25 ENCOUNTER — APPOINTMENT (OUTPATIENT)
Dept: CARDIOLOGY | Facility: HOSPITAL | Age: 34
End: 2024-06-25
Payer: COMMERCIAL

## 2024-06-25 ENCOUNTER — APPOINTMENT (OUTPATIENT)
Dept: RADIOLOGY | Facility: HOSPITAL | Age: 34
End: 2024-06-25
Payer: COMMERCIAL

## 2024-06-25 ENCOUNTER — HOSPITAL ENCOUNTER (EMERGENCY)
Facility: HOSPITAL | Age: 34
Discharge: HOME | End: 2024-06-25
Attending: STUDENT IN AN ORGANIZED HEALTH CARE EDUCATION/TRAINING PROGRAM
Payer: COMMERCIAL

## 2024-06-25 VITALS
DIASTOLIC BLOOD PRESSURE: 70 MMHG | WEIGHT: 141 LBS | OXYGEN SATURATION: 100 % | HEART RATE: 76 BPM | RESPIRATION RATE: 18 BRPM | BODY MASS INDEX: 24.07 KG/M2 | SYSTOLIC BLOOD PRESSURE: 109 MMHG | HEIGHT: 64 IN | TEMPERATURE: 97.7 F

## 2024-06-25 DIAGNOSIS — S46.811S: Primary | ICD-10-CM

## 2024-06-25 DIAGNOSIS — M25.511 RIGHT SHOULDER PAIN, UNSPECIFIED CHRONICITY: ICD-10-CM

## 2024-06-25 LAB
ALBUMIN SERPL BCP-MCNC: 3.9 G/DL (ref 3.4–5)
ALP SERPL-CCNC: 74 U/L (ref 33–110)
ALT SERPL W P-5'-P-CCNC: 15 U/L (ref 7–45)
ANION GAP SERPL CALC-SCNC: 10 MMOL/L (ref 10–20)
AST SERPL W P-5'-P-CCNC: 12 U/L (ref 9–39)
ATRIAL RATE: 98 BPM
BASOPHILS # BLD AUTO: 0.04 X10*3/UL (ref 0–0.1)
BASOPHILS NFR BLD AUTO: 0.5 %
BILIRUB SERPL-MCNC: 0.5 MG/DL (ref 0–1.2)
BUN SERPL-MCNC: 6 MG/DL (ref 6–23)
CALCIUM SERPL-MCNC: 9.4 MG/DL (ref 8.6–10.3)
CARDIAC TROPONIN I PNL SERPL HS: <3 NG/L (ref 0–13)
CARDIAC TROPONIN I PNL SERPL HS: <3 NG/L (ref 0–13)
CHLORIDE SERPL-SCNC: 102 MMOL/L (ref 98–107)
CO2 SERPL-SCNC: 28 MMOL/L (ref 21–32)
CREAT SERPL-MCNC: 0.98 MG/DL (ref 0.5–1.05)
EGFRCR SERPLBLD CKD-EPI 2021: 79 ML/MIN/1.73M*2
EOSINOPHIL # BLD AUTO: 0.15 X10*3/UL (ref 0–0.7)
EOSINOPHIL NFR BLD AUTO: 1.8 %
ERYTHROCYTE [DISTWIDTH] IN BLOOD BY AUTOMATED COUNT: 12.5 % (ref 11.5–14.5)
GLUCOSE SERPL-MCNC: 85 MG/DL (ref 74–99)
HCT VFR BLD AUTO: 38.3 % (ref 36–46)
HGB BLD-MCNC: 12.6 G/DL (ref 12–16)
IMM GRANULOCYTES # BLD AUTO: 0.03 X10*3/UL (ref 0–0.7)
IMM GRANULOCYTES NFR BLD AUTO: 0.4 % (ref 0–0.9)
LYMPHOCYTES # BLD AUTO: 2.24 X10*3/UL (ref 1.2–4.8)
LYMPHOCYTES NFR BLD AUTO: 27.1 %
MCH RBC QN AUTO: 27 PG (ref 26–34)
MCHC RBC AUTO-ENTMCNC: 32.9 G/DL (ref 32–36)
MCV RBC AUTO: 82 FL (ref 80–100)
MONOCYTES # BLD AUTO: 0.38 X10*3/UL (ref 0.1–1)
MONOCYTES NFR BLD AUTO: 4.6 %
NEUTROPHILS # BLD AUTO: 5.44 X10*3/UL (ref 1.2–7.7)
NEUTROPHILS NFR BLD AUTO: 65.6 %
NRBC BLD-RTO: 0 /100 WBCS (ref 0–0)
P AXIS: 95 DEGREES
P OFFSET: 210 MS
P ONSET: 176 MS
PLATELET # BLD AUTO: 307 X10*3/UL (ref 150–450)
POTASSIUM SERPL-SCNC: 3.7 MMOL/L (ref 3.5–5.3)
PR INTERVAL: 106 MS
PROT SERPL-MCNC: 7.5 G/DL (ref 6.4–8.2)
Q ONSET: 229 MS
QRS COUNT: 16 BEATS
QRS DURATION: 74 MS
QT INTERVAL: 324 MS
QTC CALCULATION(BAZETT): 413 MS
QTC FREDERICIA: 381 MS
R AXIS: 90 DEGREES
RBC # BLD AUTO: 4.66 X10*6/UL (ref 4–5.2)
SODIUM SERPL-SCNC: 136 MMOL/L (ref 136–145)
T AXIS: 90 DEGREES
T OFFSET: 391 MS
VENTRICULAR RATE: 98 BPM
WBC # BLD AUTO: 8.3 X10*3/UL (ref 4.4–11.3)

## 2024-06-25 PROCEDURE — 93005 ELECTROCARDIOGRAM TRACING: CPT

## 2024-06-25 PROCEDURE — 71045 X-RAY EXAM CHEST 1 VIEW: CPT | Performed by: RADIOLOGY

## 2024-06-25 PROCEDURE — 36415 COLL VENOUS BLD VENIPUNCTURE: CPT | Performed by: EMERGENCY MEDICINE

## 2024-06-25 PROCEDURE — 71045 X-RAY EXAM CHEST 1 VIEW: CPT

## 2024-06-25 PROCEDURE — 96374 THER/PROPH/DIAG INJ IV PUSH: CPT

## 2024-06-25 PROCEDURE — 84075 ASSAY ALKALINE PHOSPHATASE: CPT | Performed by: STUDENT IN AN ORGANIZED HEALTH CARE EDUCATION/TRAINING PROGRAM

## 2024-06-25 PROCEDURE — 36415 COLL VENOUS BLD VENIPUNCTURE: CPT | Performed by: STUDENT IN AN ORGANIZED HEALTH CARE EDUCATION/TRAINING PROGRAM

## 2024-06-25 PROCEDURE — 84484 ASSAY OF TROPONIN QUANT: CPT | Performed by: STUDENT IN AN ORGANIZED HEALTH CARE EDUCATION/TRAINING PROGRAM

## 2024-06-25 PROCEDURE — 99284 EMERGENCY DEPT VISIT MOD MDM: CPT | Mod: 25

## 2024-06-25 PROCEDURE — 2500000004 HC RX 250 GENERAL PHARMACY W/ HCPCS (ALT 636 FOR OP/ED)

## 2024-06-25 PROCEDURE — 85025 COMPLETE CBC W/AUTO DIFF WBC: CPT | Performed by: STUDENT IN AN ORGANIZED HEALTH CARE EDUCATION/TRAINING PROGRAM

## 2024-06-25 PROCEDURE — 84484 ASSAY OF TROPONIN QUANT: CPT | Mod: 91 | Performed by: STUDENT IN AN ORGANIZED HEALTH CARE EDUCATION/TRAINING PROGRAM

## 2024-06-25 RX ORDER — KETOROLAC TROMETHAMINE 30 MG/ML
15 INJECTION, SOLUTION INTRAMUSCULAR; INTRAVENOUS ONCE
Status: COMPLETED | OUTPATIENT
Start: 2024-06-25 | End: 2024-06-25

## 2024-06-25 RX ORDER — IBUPROFEN 600 MG/1
600 TABLET ORAL EVERY 6 HOURS PRN
Qty: 28 TABLET | Refills: 0 | Status: SHIPPED | OUTPATIENT
Start: 2024-06-25 | End: 2024-07-02

## 2024-06-25 RX ORDER — LIDOCAINE 560 MG/1
1 PATCH PERCUTANEOUS; TOPICAL; TRANSDERMAL DAILY
Qty: 10 PATCH | Refills: 0 | Status: SHIPPED | OUTPATIENT
Start: 2024-06-25 | End: 2024-07-05

## 2024-06-25 ASSESSMENT — PAIN DESCRIPTION - ORIENTATION
ORIENTATION: RIGHT

## 2024-06-25 ASSESSMENT — PAIN DESCRIPTION - LOCATION
LOCATION: SHOULDER

## 2024-06-25 ASSESSMENT — PAIN DESCRIPTION - DESCRIPTORS
DESCRIPTORS: THROBBING
DESCRIPTORS: THROBBING

## 2024-06-25 ASSESSMENT — PAIN - FUNCTIONAL ASSESSMENT
PAIN_FUNCTIONAL_ASSESSMENT: 0-10

## 2024-06-25 ASSESSMENT — PAIN DESCRIPTION - PAIN TYPE
TYPE: ACUTE PAIN
TYPE: ACUTE PAIN

## 2024-06-25 ASSESSMENT — PAIN SCALES - GENERAL
PAINLEVEL_OUTOF10: 10 - WORST POSSIBLE PAIN
PAINLEVEL_OUTOF10: 0 - NO PAIN
PAINLEVEL_OUTOF10: 10 - WORST POSSIBLE PAIN
PAINLEVEL_OUTOF10: 10 - WORST POSSIBLE PAIN

## 2024-06-25 NOTE — ED TRIAGE NOTES
Pt states that at roughly 10am she developed back pain in between her shoulder that radiates to her right shoulder. Pt states that the pain gave her chest as well. Pt denies lifting, or moving anything heavy

## 2024-06-25 NOTE — DISCHARGE INSTRUCTIONS
You are seen today due to concerns of right shoulder sprain and have slight tendinopathy of your infraspinatus muscle.  Please follow-up with orthopedic surgery and use rest, ice, compression and elevation as needed for symptom control along with Tylenol Motrin and lidocaine patches.  Return if you have any worsening symptoms

## 2024-06-25 NOTE — ED PROVIDER NOTES
CC: Back Pain, Shoulder Pain, and Chest Pain     HPI:     Patient is a 33-year-old female with history of major depressive disorder, fibromyalgia, right shoulder muscle spasms, atrial flutter status post ablation, asthma presenting due to right scapular pain that has been worsening for the past day.  Patient denies any trauma or muscle strain however reports that this is not new for her.  She reports she recently was discharged from [facility] [facility] without any changes to her medications and has been compliant with her Cymbalta.  Patient has not been taking anything other than Flexeril for her pain and denies any heat or ice to her shoulder recently.  Patient has not had any recent instrumentation but did see an orthopedic surgeon who ordered an MRI of her right shoulder yesterday.  Patient reports slight chest pain that improves when she sits upright and leans towards her left.  She denies any recent URI or viral symptoms, no nausea or vomiting, fevers or chills, shortness of breath, abdominal pain or changes in her urinary or bowel habits.  Patient is not had any syncopal episodes and does have intact range of motion of her entire right shoulder with intact strength that is slightly slow down secondary to her pain.    Limitations to History: none  Additional History Obtained from:     PMHx/PSHx:  Per HPI.   - has a past medical history of Acute sinusitis, unspecified (02/01/2016), Encounter for initial prescription of contraceptive pills (08/10/2018), Encounter for removal of intrauterine contraceptive device (03/01/2016), Encounter for screening for malignant neoplasm of cervix (11/24/2015), Myopia, bilateral (12/01/2014), Other conditions influencing health status, Other specified health status (05/23/2017), Other specified symptoms and signs involving the digestive system and abdomen (07/09/2015), Pain in right shoulder (10/07/2020), Pelvic and perineal pain (05/28/2021), Personal history of other  diseases of the digestive system (07/30/2015), Personal history of other diseases of the female genital tract (08/10/2018), Personal history of other diseases of the female genital tract (08/10/2018), Personal history of other diseases of the female genital tract (09/11/2015), Personal history of other diseases of the musculoskeletal system and connective tissue (10/13/2022), Personal history of other diseases of the respiratory system, Personal history of other specified conditions, and Unspecified astigmatism, bilateral (12/01/2014).  - has a past surgical history that includes Other surgical history (08/18/2020); Other surgical history (07/31/2015); IR angiogram inferior epigastric pelvic (2/9/2023); IR angiogram inferior epigastric pelvic (2/9/2023); IR angiogram inferior epigastric (2/9/2023); IR venogram lower extremity (Left, 12/11/2023); and Invasive Vascular Procedure (N/A, 4/15/2024).    Social History:  - Tobacco:  reports that she has never smoked. She has never used smokeless tobacco.   - Alcohol: Alcohol use questions deferred to the physician.   - Drugs: Drug use questions deferred to the physician.     Medications: Reviewed in EMR.     Allergies:  Benzonatate, Coconut, Erythromycin-benzoyl peroxide, Fruit flavor, Peach, Pineapple, Pollen extracts, and Sulfamethoxazole-trimethoprim    ???????????????????????????????????????????????????????????????  Triage Vitals:  T 36.5 °C (97.7 °F)  HR 97  /84  RR 18  O2 98 % None (Room air)    Physical Exam  Vitals and nursing note reviewed.   Constitutional:       General: She is not in acute distress.     Appearance: She is well-developed.   HENT:      Head: Normocephalic and atraumatic.      Mouth/Throat:      Mouth: Mucous membranes are dry.      Pharynx: Oropharynx is clear.   Eyes:      Conjunctiva/sclera: Conjunctivae normal.   Cardiovascular:      Rate and Rhythm: Normal rate and regular rhythm.      Heart sounds: No murmur heard.  Pulmonary:       Effort: Pulmonary effort is normal. No respiratory distress.      Breath sounds: Normal breath sounds. No wheezing, rhonchi or rales.   Abdominal:      General: There is no distension.      Palpations: Abdomen is soft.      Tenderness: There is no abdominal tenderness. There is no guarding or rebound.   Musculoskeletal:         General: Tenderness (over R scapula that is reproducible with palpation) present. No swelling.      Cervical back: Neck supple.      Comments: Intact internal and external rotation, abduction and adduction of the shoulders bilaterally with sensation intact over axillary region   Skin:     General: Skin is warm and dry.      Capillary Refill: Capillary refill takes less than 2 seconds.   Neurological:      Mental Status: She is alert and oriented to person, place, and time.      Sensory: No sensory deficit.      Motor: No weakness.      Gait: Gait normal.   Psychiatric:         Mood and Affect: Mood normal.       ???????????????????????????????????????????????????????????????  EKG (per my interpretation):  Sinus rhythm with a rate of 98.  No TX or QRS prolongation.  Normal axis without any acute ST or T wave changes.  Poor EKG quality within noisy baseline.  No obvious ST elevations or depressions.  No KERLINE    ED Course       Medical Decision Making:  Patient is a 33-year-old female with past medical history of major depressive disorder, fibromyalgia, right shoulder muscle spasms and atrial flutter presenting due to right shoulder pain that is acute on chronic in nature.  Differentials considered but not limited to tendinitis, capsulitis, musculoskeletal strain, fracture, dislocation, pericarditis, pneumothorax, pneumonia, rib fracture.    Based on patient's history and physical examination basic lab work was obtained with a negative troponin.  Patient's chest x-ray is negative and EKG is reassuring.  I took a look at the patient's MRI that was completed yesterday that does show mild strain at  the myotendinous junction of the infraspinatus muscle that is consistent with her pain.  Patient was told to take anti-inflammatories and follow RICE therapy and follow-up with orthopedic surgery for PT referral.  Patient was given Toradol in the emergency department and discharged with strict return precautions.  Patient care was overseen by attending physician agrees with the plan and disposition.    External records reviewed: recent inpatient, clinic, and prior ED notes  Diagnostic imaging independently reviewed/interpreted by me (as reflected in MDM) includes: CXR  Social Determinants Affecting Care: Mental health issues  Discussion of management with other providers: attending  Prescription Drug Consideration: ibuprofen  Escalation of Care: none    Impression:   Chest Pain  R shoulder pain    Disposition: Discharge      Procedures ? SmartLinks last updated 6/25/2024 12:13 PM     Madison Negrete  PGY-2 Emergency Medicine  [facility]     Madison Negrete MD  Resident  06/25/24 [id number]

## 2024-06-25 NOTE — Clinical Note
Armida Adkins was seen and treated in our emergency department on 6/25/2024.  She may return to work on 06/26/2024.       If you have any questions or concerns, please don't hesitate to call.      Madison Negrete MD

## 2024-07-01 ENCOUNTER — OFFICE VISIT (OUTPATIENT)
Dept: ORTHOPEDIC SURGERY | Facility: HOSPITAL | Age: 34
End: 2024-07-01
Payer: COMMERCIAL

## 2024-07-01 DIAGNOSIS — M54.12 CERVICAL RADICULITIS: ICD-10-CM

## 2024-07-01 PROCEDURE — 99213 OFFICE O/P EST LOW 20 MIN: CPT | Performed by: ORTHOPAEDIC SURGERY

## 2024-07-01 RX ORDER — METHYLPREDNISOLONE 4 MG/1
TABLET ORAL
Qty: 21 TABLET | Refills: 0 | Status: SHIPPED | OUTPATIENT
Start: 2024-07-01 | End: 2024-07-08

## 2024-07-01 NOTE — PROGRESS NOTES
Evaluation of her shoulder and left review of her MRI.  The character of her pain is changes numbness and tingling in her right hand pain down her arm from her neck to the shoulder and down the arm.    Exam today reveals patient slight decreased cervical motion and pain on rotation to the right.    MRI scan shows a strain injury at the insertion of the infra's spinatus but otherwise normal    Right shoulder strain injury with cervical radiculitis.    We discussed treatment for cervical radiculitis and the patient is going to be started on her oral steroid Dosepak and physical therapy consultation prognosis is good.    This was dictated using voice recognition software and not corrected for grammatical or spelling errors.

## 2024-07-01 NOTE — PROGRESS NOTES
Division of Minimally Invasive Gynecologic Surgery  [facility]    06/30/24 Gynecology Visit    CC: Pelvic pain     Armida Adkins is a 33 y.o. G0 who presents in follow up for pelvic pain. Currently on NuvaRing. Status post TPI w/ lidocaine on 1/11/2024. She is s/p foam sclerotherapy and coil embolization of bilateral hypogastric veins w/ Dr. Ordaz on 4/15/2024.     Amenorrheic on NuvaRing. Does report continued vaginal dryness (did 1 month trial of estrogen, uses UberLube w/o improvement). Pain with insertion but less so w/ deep thrusting. She asked today about supplements that might increase vaginal moisture.     She has done well following coil placement, but reports she has noticed return of intermittent sharp pelvic pains. Reports prior TPI in office was painful, but did offer several days of relief but less than a week.     She has not yet established with PFPT. She reports she tried to call a few times but never heard back.     She has tried:  - Vaginal Valium: moderate relief   - TPI (w/ lidocaine):   - Depo shot: tolerated well  - Progesterone IUD: tolerated well   - Robotic excision of endo w/ negative pathology   - Pain Med referral (Richelle): started on duloxetine  - GI referral: IBS-C treated w/ Linzess    Last pap: 2023 negative/negative   PMHx: pilonidal cyst (s/p surgical management), depression, a. Flutter s/p albation, asthma  PSHx: pilonidal cyst, robotic excision of endometriosis (pathology negative for endo), laparoscopic myomectomy    PMHx, PSHx, SHx, Allergies, and Medications updated in Epic.    ROS: reviewed and negative    PE: Wt 63.5 kg (140 lb)   LMP 05/09/2024 (Exact Date) Comment: PT reported 16 day menses for may  BMI 24.03 kg/m²    Constitutional:  No acute distress, well-nourished and well-developed  HEENT: EOM grossly intact, MMM, neck supple and with full ROM  Pulm:  Effort normal. No accessory muscle usage.  No respiratory  distress.  Neurological:  She is alert and oriented to person place and time.  Skin: Warm, no pallor.  Psychiatric:  She has normal mood and affect.    A/P: Armida Adkins is a 33 y.o. G0 who presents in follow up for pelvic pain. Currently on NuvaRing. Status post TPI w/ lidocaine on 1/11/2024. She is s/p foam sclerotherapy and coil embolization of bilateral hypogastric veins w/ Dr. Ordaz on 4/15/2024.   - Schedule for pelvic floor lidocaine injection. PAT: No. Any location. She would prefer injection in OR given how painful it was in clinic.   - Pudendal nerve block vs infiltration of pelvic floor musculature pending OR exam findings   - We discussed causes of vaginal dryness today. Changes due to chronic hormonal suppression less likely as trial of vaginal estrogen cream showed no benefit. Discussed that I can refer to [facility] to discuss holistic options. Also discussed that PFPT may help with this if it can improve her pelvic pain in general.   - Continue NuvaRing   - RTC 2 weeks after pelvic floor lidocaine injection to assess response. Consider Botox pending PFPT response.       Kelsea Stanton MD  Division of Minimally Invasive Gynecologic Surgery  [facility]

## 2024-07-01 NOTE — LETTER
July 1, 2024     Patient: Armida Adkins   YOB: 1990   Date of Visit: 7/1/2024       To Whom It May Concern:    It is my medical opinion that Armida Adkins should remain out of work until 7/8/2024 .    If you have any questions or concerns, please don't hesitate to call.         Sincerely,        Dave Mueller MD    CC: No Recipients

## 2024-07-02 ENCOUNTER — EVALUATION (OUTPATIENT)
Dept: PHYSICAL THERAPY | Facility: CLINIC | Age: 34
End: 2024-07-02
Payer: COMMERCIAL

## 2024-07-02 DIAGNOSIS — M54.12 CERVICAL RADICULITIS: Primary | ICD-10-CM

## 2024-07-02 PROCEDURE — 97110 THERAPEUTIC EXERCISES: CPT | Mod: GP | Performed by: PHYSICAL THERAPIST

## 2024-07-02 PROCEDURE — 97161 PT EVAL LOW COMPLEX 20 MIN: CPT | Mod: GP | Performed by: PHYSICAL THERAPIST

## 2024-07-02 ASSESSMENT — ENCOUNTER SYMPTOMS
DEPRESSION: 0
LOSS OF SENSATION IN FEET: 0
OCCASIONAL FEELINGS OF UNSTEADINESS: 0

## 2024-07-02 ASSESSMENT — PATIENT HEALTH QUESTIONNAIRE - PHQ9
1. LITTLE INTEREST OR PLEASURE IN DOING THINGS: NOT AT ALL
SUM OF ALL RESPONSES TO PHQ9 QUESTIONS 1 AND 2: 0
2. FEELING DOWN, DEPRESSED OR HOPELESS: NOT AT ALL

## 2024-07-02 NOTE — PROGRESS NOTES
Physical Therapy    Physical Therapy Evaluation and Treatment      Patient Name: Armida Adkins  MRN: [id number]  Today's Date: 7/2/2024  Time Calculation  Start Time: 1130  Stop Time: 1215  Time Calculation (min): 45 min        Current Problem:   1. Cervical radiculitis  Referral to Physical Therapy            Referring provider: Dr. Mueller    Insurance:  Visit #: 1  Approved number of visits: ?  Auth Required: yes  Auth date range: ?    Precautions: no fall risk, none    Assessment: Pt is 33 y.o. female c/o insidious onset right scapular pain which radiates down entire RUE into lateral three digits and numbness to the same finger tips. Pt demonstrates slight forward head posture, slight decrease in cervical spine AROM with pain during movement and at end ranges, decreased B shoulder strength but no neuro weakness noted, and trigger points to right upper trap and right suboccipitals. Pt has decreased right forearm pain and hand symptoms following cervical and thoracic extension exercises possibly indicative of lower cervical posterior derangement- will continue to assess. Pt is a good candidate for skilled PT intervention to meet outlined goals and improve function at home and work.     Plan:  Interventions: Biofeedback, Cryotherapy, Dry Needling, Education/Instruction, Electrical Stimulation, Home Program, Hot Pack, Kinesiotaping, Manual Therapy, Neuromuscular Re-education, Self Care/Home Management, self-care, Therapeutic Exercises, Ultrasound, Mechanical Traction, Aquatic Therapy, Vasopneumatic device with cold  Frequency and Duration: 1-2x week for 8 weeks  Rehab Potential: good  Plan of Care Agreement: patient      Goals:  Pt will meet the following goals in 8 weeks  Pt will report <0-2/10 pain with ADL's and functional mobility.  Pt will be independent with HEP at least 3 days per week to maintain gains made in therapy.   Pt will demonstrate full cervical spine AROM without pain allowing for normal  driving, dressing, etc.  Pt will activate TA independently to find neutral sitting and standing posture thus decreasing spinal load  Pt will increase B shoulder strength 5/5 without pain allowing patient to return to lifting, pushing, pulling, needed at work  Pt will sleep full 6 hours without waking due to pain allowing for restorative sleep  NDI < 10/50        Subjective:    Chief Complaint: Pt states she was sitting at home her bed when pain suddenly hit her in the right shoulder blade.  MRI was done for shoulder which showed an irritation in muscle on back of shoulder but pain is most likely from neck.  Right hand dominant     Relevant PMH: asthma     Pain:  Location: right shoulder blade, down entire right arm, and into lateral three fingers.  Lateral three finger tips are numb.    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, scrub showers/steam room, load, refill, mop floors     Exercise: no    Pt goals for PT: be consistent with exercises at home, get rid of pain     Objective:    Posture:  Slight forward head and round shoulders    Neuro screens:  Negative neuro weakness       cervical spine AROM loss:  Flexion: mod *  Ext: min *  RSB: nil * pain down right arm  LSB: nil *pain down right arm   RR: min  LR: nil *pain all the way down right arm        Strength (MMT):    Shoulder flexion: R 4+, L 4+  Shoulder abduction: R 4+, L 4+      Range of Motion (degrees of motion):    B shoulder AROM WNL     Palpation/other:    Baseline 4/10 right forearm pain   Sitting:  Rep cervical retraction 10x W, NW  Rep RSB 10x W,N W  Rep RR 10x   Rep cervical  retraction ext 10x BETTER    Prone sustained cervical ext W    Supine overhead B shoulder flexion with hands clasped 10x BETTER    Outcome Measure:  NDI= 33/50    Pt tender to R upper trap with palpable trigger points     Slight decrease in RUE symptoms with gentle manual cervical traction     Treatment today:   1. Initial evaluation   2. Pt ed on diagnosis, prognosis, goals of PT, expectations   3. HEP (see below) ed on normal vs abnormal response    Access Code: [email or web address]  URL: [email or web address]  Date: 07/02/2024  Prepared by: Savannah Lang    Exercises  - Supine Shoulder Flexion AAROM  - 2 x daily - 7 x weekly - 2 sets - 10 reps  - Seated Correct Posture  - 2 x daily - 7 x weekly - 2 sets - 10 reps  - Seated Cervical Retraction and Extension  - 2 x daily - 7 x weekly - 3-5 sets - 10 reps - 3 hold  - Standing Scapular Retraction  - 2 x daily - 7 x weekly - 2 sets - 10 reps  - Doorway Pec Stretch at 90 Degrees Abduction  - 2 x daily - 7 x weekly - 1 sets - 3 reps - 15 hold

## 2024-07-05 ENCOUNTER — APPOINTMENT (OUTPATIENT)
Dept: OBSTETRICS AND GYNECOLOGY | Facility: CLINIC | Age: 34
End: 2024-07-05
Payer: COMMERCIAL

## 2024-07-05 VITALS — WEIGHT: 140 LBS | BODY MASS INDEX: 24.03 KG/M2

## 2024-07-05 DIAGNOSIS — N89.8 VAGINAL DRYNESS: ICD-10-CM

## 2024-07-05 DIAGNOSIS — Z30.019 ENCOUNTER FOR FEMALE BIRTH CONTROL: ICD-10-CM

## 2024-07-05 DIAGNOSIS — M79.18 MYALGIA, OTHER SITE: ICD-10-CM

## 2024-07-05 DIAGNOSIS — R10.2 PELVIC PAIN: Primary | ICD-10-CM

## 2024-07-05 DIAGNOSIS — Z01.818 PREOP TESTING: ICD-10-CM

## 2024-07-05 PROCEDURE — 99214 OFFICE O/P EST MOD 30 MIN: CPT | Performed by: STUDENT IN AN ORGANIZED HEALTH CARE EDUCATION/TRAINING PROGRAM

## 2024-07-05 RX ORDER — ETONOGESTREL AND ETHINYL ESTRADIOL VAGINAL RING .015; .12 MG/D; MG/D
1 RING VAGINAL
Qty: 90 EACH | Refills: 3 | Status: SHIPPED | OUTPATIENT
Start: 2024-07-05 | End: 2024-07-05

## 2024-07-05 RX ORDER — ETONOGESTREL AND ETHINYL ESTRADIOL VAGINAL RING .015; .12 MG/D; MG/D
1 RING VAGINAL
Qty: 90 EACH | Refills: 3 | Status: SHIPPED | OUTPATIENT
Start: 2024-07-05

## 2024-07-05 ASSESSMENT — ENCOUNTER SYMPTOMS
CARDIOVASCULAR NEGATIVE: 0
HEMATOLOGIC/LYMPHATIC NEGATIVE: 0
ALLERGIC/IMMUNOLOGIC NEGATIVE: 0
ENDOCRINE NEGATIVE: 0
NEUROLOGICAL NEGATIVE: 0
EYES NEGATIVE: 0
MUSCULOSKELETAL NEGATIVE: 0
PSYCHIATRIC NEGATIVE: 0
GASTROINTESTINAL NEGATIVE: 0
RESPIRATORY NEGATIVE: 0
CONSTITUTIONAL NEGATIVE: 0

## 2024-07-05 ASSESSMENT — PAIN SCALES - GENERAL: PAINLEVEL: 0-NO PAIN

## 2024-07-08 ENCOUNTER — APPOINTMENT (OUTPATIENT)
Dept: ORTHOPEDIC SURGERY | Facility: HOSPITAL | Age: 34
End: 2024-07-08
Payer: COMMERCIAL

## 2024-07-08 PROBLEM — M79.18 MYALGIA, OTHER SITE: Status: ACTIVE | Noted: 2024-07-05

## 2024-07-10 ENCOUNTER — OFFICE VISIT (OUTPATIENT)
Dept: ORTHOPEDIC SURGERY | Facility: HOSPITAL | Age: 34
End: 2024-07-10
Payer: COMMERCIAL

## 2024-07-10 VITALS — BODY MASS INDEX: 23.9 KG/M2 | WEIGHT: 140 LBS | HEIGHT: 64 IN

## 2024-07-10 DIAGNOSIS — M54.12 CERVICAL RADICULITIS: ICD-10-CM

## 2024-07-10 PROCEDURE — 1036F TOBACCO NON-USER: CPT | Performed by: ORTHOPAEDIC SURGERY

## 2024-07-10 PROCEDURE — 99212 OFFICE O/P EST SF 10 MIN: CPT | Performed by: ORTHOPAEDIC SURGERY

## 2024-07-10 RX ORDER — MELOXICAM 15 MG/1
15 TABLET ORAL DAILY
Qty: 14 TABLET | Refills: 0 | Status: SHIPPED | OUTPATIENT
Start: 2024-07-10 | End: 2024-07-24

## 2024-07-10 NOTE — PROGRESS NOTES
Patient has persistent radicular symptoms including the index and long finger and thumb.  The shoulder pain is better but she still has neck pain as well    Exam today reveals pain in the neck with extension and rotation to the right or left.  Full range of motion of the right shoulder without pain.    Right shoulder pain with cervical radiculitis    I believe the patient's pain is from cervical radiculitis we discussed options for treatment I recommended evaluation by one of my colleagues specializing in spine problems.  I also have given her prescription for meloxicam to hopefully mitigate the pain in the interim.    This was dictated using voice recognition software and not corrected for grammatical or spelling errors.

## 2024-07-15 ENCOUNTER — APPOINTMENT (OUTPATIENT)
Dept: PHYSICAL THERAPY | Facility: CLINIC | Age: 34
End: 2024-07-15
Payer: COMMERCIAL

## 2024-07-17 ENCOUNTER — EVALUATION (OUTPATIENT)
Dept: PHYSICAL THERAPY | Facility: CLINIC | Age: 34
End: 2024-07-17
Payer: COMMERCIAL

## 2024-07-17 DIAGNOSIS — R10.2 PELVIC PAIN: ICD-10-CM

## 2024-07-17 DIAGNOSIS — M62.89 MUSCLE TIGHTNESS: Primary | ICD-10-CM

## 2024-07-17 PROCEDURE — 97535 SELF CARE MNGMENT TRAINING: CPT | Mod: GP | Performed by: PHYSICAL THERAPIST

## 2024-07-17 PROCEDURE — 97162 PT EVAL MOD COMPLEX 30 MIN: CPT | Mod: GP | Performed by: PHYSICAL THERAPIST

## 2024-07-17 ASSESSMENT — ENCOUNTER SYMPTOMS: DEPRESSION: 1

## 2024-07-17 NOTE — PROGRESS NOTES
Physical Therapy  Physical Therapy Pelvic Floor Evaluation    Name: Armida Adkins  MRN: [id number]  : 1990  Today's Date: 24     Time Calculation  Start Time: 1030  Stop Time: 1130  Time Calculation (min): 60 min    Insurance: [employer] [facility]  Visit # 1 of MN (needs auth after 12 visits)    Current Problem:  1. Muscle tightness  Follow Up In Physical Therapy      2. Pelvic pain  Referral to Physical Therapy    Follow Up In Physical Therapy          General  Reason for Referral: pelvic pain  Referred By: Kelsea Stanton  Precautions  YUNI Fall Risk Score (The score of 4 or more indicates an increased risk of falling): 2  Precautions Comment: chronic pelvic pain  Vital Signs     Pain       Subjective  Chief Complaint: Complex Chronic Pelvic Pain and pain with intercourse, 5+ years + intermittent shocking pains in suprapubic area (2-3x/week)  - Pelvic Floor TPI with lidocaine - helpful, but didn't last very long  - 4/15/24: s/p sclerotherapy & coil embolization, bilateral hypogastric veins -   - stabbing pains in the uterus, grabbing, bent over (with intercourse or randomly when walking)  [place]:   - rectal pressure when on hands and knees or prone/pillow prop  - increased pressure with intercourse due to size of bodies (belly to belly)  - pain every-time (avoiding sex because of pain)  - Amenorrheic on NuvaRing  Onset: 5+ year  BERTHA: unknown    Current Condition: a little better    PAIN  Intensity (0-10): 4-5  Location: suprapubic  Description: shocking, sharp (5 minutes)    Aggravating Factors: with movement and activity  Relieving Factors: pressure and breathe    Relevant Information (PMH & Previous Tests/Imaging): see chart  Previous Interventions/Treatments:   Via Note of :    She has tried:   - Vaginal Valium: moderate relief   - TPI (w/ lidocaine):   - Depo shot: tolerated well  - Progesterone IUD: tolerated well   - Robotic excision of endo w/ negative pathology    - Pain Med referral (Richelle): started on duloxetine  - GI referral: IBS-C treated w/ Linzess    Via  Note 24  Last pap:  negative/negative   PMHx: pilonidal cyst (s/p surgical management), depression, a. 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 No    Painful [place] or vaginal penetration? Yes  Frequency? Every time  Positional?  Better: on top, facing toward, supine/legs in area  Marinoff Scale? 3   0 - no pain during intercourse    1 - pain during intercourse that does not prevent intercourse    2 - pain during intercourse that interrupts intercourse    3 - pain that prevents intercourse     BLADDER  UTIs? 1st time - last year  Urinary Incontinence/Leakage: vaginal discharge - no urine leaking ((sneezing or laughing, maybe a little)  Average Fluid Intake (glasses/day): 40oz, water, Asada - caffeine, KoolAide    BOWEL  Frequency of Bowel Movements: 2-3x/week  Stool Form: Green Scale = Constipation  Management Techniques: running water & rubbing her back  Bowel Incontinence/Leakage? No      Objective  Patient was educated on pelvic floor anatomy, function, and dysfunction.   Patient was educated on an orthopedic assessment & external pelvic floor assessment technique and verbalized consent.    The patient is aware they have full autonomy and can stop the assessment at any time.      Standing Assessment:   Posture: forward head, rounded shoulders  Gait: stiff gait, lack of thoracic rotation or hip extension beyond midstance bilaterally  Standing Lumbar Mobility: moderate tightness all directions  Breathing: accessory breathing, lack of diaphragmatic or lower abdominal movement    OUTCOMES MEASURE:  Hima Pelvic Dysfunction Screening Tool : positive    NIH-CPSI  -Pain: 10  - Urinary Symp: 1  - QOL: 11    Goals:         Education: home exercise program, plan of care, activity modifications, pain management, and injury pathology  Bladder Habits: Just in Case Pee, Hover over the toilet, relax & breathe, squatty potty use  Hydration Recommendation: 50% of your body wt (pounds) in fluid ounces  Bladder Irritants: caffeine, artificial sweeteners, carbonated beverages, alcohol  Fiber Intake Recommendation: 25-30g/day    Treatments: education  - discussed mind-body approach and connection of symptoms with stress/trauma  - emphasis on continued mental health therapy & recommend certified MFT - marriage and family therapist  - discussed multiple factors contributing to vulvo-vaginal dryness  - discussed use of estrogen cream in combination with moisturizer, such as: Replens or Vmagic  - discussed tightness of the pelvic floor contribution to symptoms    Plan for Next Visit: continue objective assessment  - breathwork  - PF stretching  - discussed PF 3 part assessment, education on pelvic floor function and dysfunction    Assessment: Patient presents with signs and symptoms consistent with complex chronic pelvic pain, abnormal posture, muscle tightness, hypomobility, resulting in limited participation in pain-free ADLs and inability to perform at their prior level of function. Pt would benefit from physical therapy to address the impairments found & listed previously in the objective section in order to return to  safe and pain-free ADLs and prior level of function.    Plan:   Planned Interventions include: therapeutic exercise, self-care home management, manual therapy, therapeutic activities, gait training, neuromuscular coordination, aquatic therapy  Patient and/or family understands and agrees with goals and plan documented: yes  Frequency: 2x/month  Duration: 3-4 months     Sangita Ritchie, PT

## 2024-07-18 ENCOUNTER — TREATMENT (OUTPATIENT)
Dept: PHYSICAL THERAPY | Facility: CLINIC | Age: 34
End: 2024-07-18
Payer: COMMERCIAL

## 2024-07-18 DIAGNOSIS — M54.12 CERVICAL RADICULITIS: ICD-10-CM

## 2024-07-18 PROCEDURE — 97012 MECHANICAL TRACTION THERAPY: CPT | Mod: GP,CQ

## 2024-07-18 PROCEDURE — 97110 THERAPEUTIC EXERCISES: CPT | Mod: GP,CQ

## 2024-07-18 NOTE — PROGRESS NOTES
"PHYSICAL THERAPY   TREATMENT NOTE    Patient Name:  Armida Murraying   Patient MRN: [id number]  Date: 7/18/2024    Time Calculation  Start Time: 1230  Stop Time: 1315  Time Calculation (min): 45 min    Insurance:  Insurance Type: [employer] [facility]  Visit number: 2    Approved # of visits 20 needs auth after 12v  Authorization Needed: yes  Cert Date Ends On:     General   Reason for visit: Cervical pain  Referred by: Dr Mueller    Therapy diagnoses:   Problem List Items Addressed This Visit             ICD-10-CM       Neuro    Cervical radiculitis (Chronic) M54.12       Assessment:    Pt meño new news w/o difficulty. Pt experienced dec radicular symptoms in fingers w/ cervical TX.    Plan:  Cont per POC      Subjective  Pt states she is sore on R side of neck today. Pt states she has some numbness in first two fingers on R side. Pt states she is doing exs at home.  - Pain (0-10):3/10    Precautions  PMH: asthma  Fall Risk: none    Objective    Treatment Performed:   Therapeutic Exercise:   SciFit L2x6' fwd/bkwd  Cervical retr 5\"x10 supine  Cerv rot/SB 5\" x10 supine  Cerv ext 5\"x10  Scap retr 5\"x20  Upper trap str 30\"x3   Door str 30\"x3  AROM Flex/abd 2x10 std    Manual Therapy:     Neuromuscular Re-education:     Gait Training:      Modalities: minutes  Cervical TX 16# x10 min 60 sec on/20 sec off       PT Therapeutic Procedures Time Entry  Therapeutic Exercise Time Entry: 35  PT Modalities Time Entry  Mechanical Traction Time Entry: 10               "

## 2024-07-22 ENCOUNTER — DOCUMENTATION (OUTPATIENT)
Dept: PHYSICAL THERAPY | Facility: CLINIC | Age: 34
End: 2024-07-22
Payer: COMMERCIAL

## 2024-07-22 ENCOUNTER — TREATMENT (OUTPATIENT)
Dept: PHYSICAL THERAPY | Facility: CLINIC | Age: 34
End: 2024-07-22
Payer: COMMERCIAL

## 2024-07-22 DIAGNOSIS — M54.12 CERVICAL RADICULITIS: ICD-10-CM

## 2024-07-22 PROCEDURE — 97110 THERAPEUTIC EXERCISES: CPT | Mod: GP | Performed by: PHYSICAL THERAPIST

## 2024-07-22 PROCEDURE — 97140 MANUAL THERAPY 1/> REGIONS: CPT | Mod: GP | Performed by: PHYSICAL THERAPIST

## 2024-07-22 NOTE — PROGRESS NOTES
"PHYSICAL THERAPY   TREATMENT NOTE    Patient Name:  Armida Murraying   Patient MRN: [id number]  Date: 7/22/2024    Time Calculation  Start Time: 1250  Stop Time: 1343  Time Calculation (min): 53 min    Insurance:  Insurance Type: [employer] [facility]  Visit number: 4   Approved # of visits 20 needs auth after 12v  Authorization Needed: yes  Cert Date Ends On:     General   Reason for visit: Cervical pain  Referred by: Dr Mueller    Therapy diagnoses:   Problem List Items Addressed This Visit             ICD-10-CM    Cervical radiculitis (Chronic) M54.12       Assessment:    Pt responding well to current extension POC.  Does well with progression of scapular strength and stabilization without increased pain.      Plan:  Continue with cervical extension DP, scapular strength and stabilization  Pt transferring pelvic floor POC to this location with me as well      Subjective  Pt states her neck is feeling better but it is still lingering in the arm. Traction seemed to help a little bit.  Overall pain intensity is less.   - Pain (0-10): 4/10    Precautions  PMH: asthma  Fall Risk: none    Objective    Treatment Performed:   Therapeutic Exercise:   - SciFit L2x6' fwd/bkwd  - Door stretch 30\"x3   - red band B shoulder rows 10x 2  - red band B shoulder ext 10x 2   - B shoulder ER with scap retraction red 10x  - standing B shoulder horizontal abduction red 10x  - sitting cervical retraction 10x  - sitting cervical retraction ext 5x   - 1/2 foam roll lying with pec stretch 2 minutes  - 1/2 foam roll B OH shoulder flexion     Access Code: [email or web address]  URL: [email or web address]  Date: 07/22/2024  Prepared by: Savannah Lang    Exercises  - Seated Cervical Retraction  - 2 x daily - 7 x weekly - 2 sets - 10 reps  - Shoulder extension with resistance - Neutral  - 2 x daily - 7 x weekly - 2 sets - 10 reps  - Standing Row with Anchored Resistance  - 2 x daily - 7 x weekly - 2 sets - 10 reps  - Standing " Shoulder Horizontal Abduction with Resistance  - 2 x daily - 7 x weekly - 2 sets - 10 reps  - Shoulder External Rotation with Resistance  - 2 x daily - 7 x weekly - 2 sets - 10 reps    Manual Therapy:   - supine manual cervical traction  - gentle PA mobs cervical spine  - suboccipital release  - TPR B UT     Neuromuscular Re-education:     Gait Training:      Modalities: minutes         PT Therapeutic Procedures Time Entry  Manual Therapy Time Entry: 10  Therapeutic Exercise Time Entry: 43

## 2024-07-22 NOTE — PROGRESS NOTES
To assist with continuity of care, patient will be transferring pelvic floor physical therapy to [place] Location with Savannah Lang, PT.     Sangita Ritchie, PT

## 2024-07-23 ENCOUNTER — HOSPITAL ENCOUNTER (OUTPATIENT)
Dept: RADIOLOGY | Facility: CLINIC | Age: 34
Discharge: HOME | End: 2024-07-23
Payer: COMMERCIAL

## 2024-07-23 ENCOUNTER — APPOINTMENT (OUTPATIENT)
Dept: ORTHOPEDIC SURGERY | Facility: CLINIC | Age: 34
End: 2024-07-23
Payer: COMMERCIAL

## 2024-07-23 DIAGNOSIS — M54.12 CERVICAL RADICULITIS: ICD-10-CM

## 2024-07-23 PROCEDURE — 72050 X-RAY EXAM NECK SPINE 4/5VWS: CPT

## 2024-07-23 PROCEDURE — 72050 X-RAY EXAM NECK SPINE 4/5VWS: CPT | Performed by: RADIOLOGY

## 2024-07-23 PROCEDURE — 99214 OFFICE O/P EST MOD 30 MIN: CPT

## 2024-07-23 RX ORDER — KETOROLAC TROMETHAMINE 10 MG/1
10 TABLET, FILM COATED ORAL EVERY 8 HOURS PRN
Qty: 15 TABLET | Refills: 0 | Status: SHIPPED | OUTPATIENT
Start: 2024-07-23 | End: 2024-07-28

## 2024-07-23 RX ORDER — PREDNISONE 20 MG/1
TABLET ORAL
Qty: 30 TABLET | Refills: 0 | Status: SHIPPED | OUTPATIENT
Start: 2024-07-23 | End: 2024-08-07

## 2024-07-23 NOTE — PROGRESS NOTES
HPI:  Armida Adkins is a 34-year-old female who was referred here by Dr. Mueller for a 3-week history of right-sided cervical radicular symptoms.  Her symptoms are in the back of the right shoulder, into the arm, and into the fingertips of all 5 fingers, but mainly in the first 3 fingertips.  Fingers relatively constant, but the pain fluctuates in intensity.  It has been staying the same over the past few weeks.  She has been going to physical therapy which helps somewhat.  Nothing alleviates her pain, she tried a Medrol Dosepak.  Nothing in particular worsens the pain.  She denies left arm involvement.  She denies balance or coordination deficits.    ROS:  Reviewed on EMR and patient intake sheet.    PMH/SH:  Reviewed on EMR and patient intake sheet.    Exam:  MSK: Full strength range of motion of upper extremities bilaterally.  Negative Misti's, negative Spurling's.  General: No acute distress. Awake and conversant.  Eyes: Normal conjunctiva, anicteric. Round symmetric pupils.  ENT: Hearing grossly intact. No nasal discharge.  Neck: Neck is supple. No masses or thyromegaly.  Respiratory: Respirations are non-labored. No wheezing.  Skin: Warm. No rashes or ulcers.  Psych: Alert and oriented. Cooperative, appropriate mood and affect, normal judgement.  CV: No lower extremity edema.  Neuro: Sensation and CN II-XII grossly normal.    Radiology:     Cervical x-rays personally reviewed and demonstrate posterior vertebral osteophytes throughout.  No acute fractures or dislocations.    Diagnosis:    Cervical spondylosis  Cervical radiculopathy    Assessment and Plan:  Patient was seen today for discussion of cervical radicular symptoms that have not improved on their own. We discussed conservative management and the patient was recommended to continue physical therapy. I recommended a Prednisone taper for relief of radicular symptoms. We discussed pharmacologic management of Gabapentin.  I prescribed a course of  Toradol for her acute pain symptoms; she should avoid other NSAID use with this medication.  The patient was recommended to follow up in 4-6 weeks after completion of PT or if their symptoms suddenly worsen. I recommended OTC Tylenol/NSAIDs for pain relief.  I ordered an MRI as her cervical pain has not been improving with PT. At that point, we will also discuss pain management versus surgical options. Patient agrees to the plan above.     Jay Huerta PA-C  Department of Orthopaedic Surgery  10:15 AM  07/23/24    [id number] [place] [address]    Voicemail: [phone]   Appts: [phone]  Fax: [phone]

## 2024-07-24 ENCOUNTER — TREATMENT (OUTPATIENT)
Dept: PHYSICAL THERAPY | Facility: CLINIC | Age: 34
End: 2024-07-24
Payer: COMMERCIAL

## 2024-07-24 DIAGNOSIS — M54.12 CERVICAL RADICULITIS: Primary | ICD-10-CM

## 2024-07-24 PROCEDURE — 97140 MANUAL THERAPY 1/> REGIONS: CPT | Mod: GP,CQ

## 2024-07-24 PROCEDURE — 97110 THERAPEUTIC EXERCISES: CPT | Mod: GP,CQ

## 2024-07-24 NOTE — PROGRESS NOTES
"PHYSICAL THERAPY   TREATMENT NOTE    Patient Name:  Armida Murraying   Patient MRN: [id number]  Date: 7/24/2024    Time Calculation  Start Time: 0325  Stop Time: 0418  Time Calculation (min): 53 min    Insurance:  Insurance Type: [facility]  Visit number: 4   Approved # of visits 20 needs auth after 12v  Authorization Needed: yes  Cert Date Ends On:     General   Reason for visit: Cervical pain  Referred by: Dr Mueller    Therapy diagnoses:   Problem List Items Addressed This Visit             ICD-10-CM    Cervical radiculitis - Primary (Chronic) M54.12     Assessment:    Pt tolerated session well with no increased pain throughout the session. Patient tolerated exercise progression of adding theraband to cervical retraction with no noted fatigue, denying pain. Positive response to manual therapy, with increased tissue mobility and decreased intensity of radicular symptoms to the index finger; will monitor how long symptoms are decreased for post manual.    Plan:  Continue with cervical extension DP, scapular strength and stabilization. Trial cervical mechanical traction.  Pt transferring pelvic floor POC to this location.      Subjective  Pt states everything feels the same, with numbness in R first three fingers, primarily index finger. Cervical MRI scheduled in August. X-ray did not show too much. States she is doing HEP 3x/week.  - Pain (0-10): 3/10    Precautions  PMH: asthma  Fall Risk: none    Objective    Treatment Performed:   Therapeutic Exercise:   - SciFit L2x6' fwd/bkwd  - Door stretch 30\"x3   - red band B shoulder rows 10x 2  - red band B shoulder ext 10x 2   - B shoulder ER with scap retraction red 10x  - standing B shoulder horizontal abduction red 10x  - standing cervical retraction red band 10x  - supine cervical retraction into pillow x 10  - sitting cervical retraction ext 5x NP  - 1/2 foam roll lying with pec stretch 2 minutes  - 1/2 foam roll B OH shoulder flexion x 10  - " "UT/thoracic stretch seated 30\" x 3    Access Code: [email or web address]  URL: [email or web address]  Date: 07/22/2024  Prepared by: Savannah Lang    Exercises  - Seated Cervical Retraction  - 2 x daily - 7 x weekly - 2 sets - 10 reps  - Shoulder extension with resistance - Neutral  - 2 x daily - 7 x weekly - 2 sets - 10 reps  - Standing Row with Anchored Resistance  - 2 x daily - 7 x weekly - 2 sets - 10 reps  - Standing Shoulder Horizontal Abduction with Resistance  - 2 x daily - 7 x weekly - 2 sets - 10 reps  - Shoulder External Rotation with Resistance  - 2 x daily - 7 x weekly - 2 sets - 10 reps    Manual Therapy:   - supine manual cervical traction  - gentle PA mobs cervical spine - performed by PTA  - suboccipital release  - STM/TPR B UT     Neuromuscular Re-education:     Gait Training:      Modalities: CORDELL PeckA under supervision of Gina Gutierrez PTA       PT Therapeutic Procedures Time Entry  Manual Therapy Time Entry: 18  Therapeutic Exercise Time Entry: 35                  "

## 2024-07-25 ENCOUNTER — LAB (OUTPATIENT)
Dept: LAB | Facility: LAB | Age: 34
End: 2024-07-25
Payer: COMMERCIAL

## 2024-07-25 DIAGNOSIS — Z01.818 PREOP TESTING: ICD-10-CM

## 2024-07-25 LAB
ERYTHROCYTE [DISTWIDTH] IN BLOOD BY AUTOMATED COUNT: 12.6 % (ref 11.5–14.5)
HCT VFR BLD AUTO: 34.3 % (ref 36–46)
HGB BLD-MCNC: 11.6 G/DL (ref 12–16)
MCH RBC QN AUTO: 27.6 PG (ref 26–34)
MCHC RBC AUTO-ENTMCNC: 33.8 G/DL (ref 32–36)
MCV RBC AUTO: 82 FL (ref 80–100)
NRBC BLD-RTO: 0 /100 WBCS (ref 0–0)
PLATELET # BLD AUTO: 316 X10*3/UL (ref 150–450)
RBC # BLD AUTO: 4.21 X10*6/UL (ref 4–5.2)
WBC # BLD AUTO: 16.2 X10*3/UL (ref 4.4–11.3)

## 2024-07-25 PROCEDURE — 85027 COMPLETE CBC AUTOMATED: CPT

## 2024-07-25 PROCEDURE — 86901 BLOOD TYPING SEROLOGIC RH(D): CPT

## 2024-07-25 PROCEDURE — 36415 COLL VENOUS BLD VENIPUNCTURE: CPT

## 2024-07-25 PROCEDURE — 86900 BLOOD TYPING SEROLOGIC ABO: CPT

## 2024-07-25 PROCEDURE — 86850 RBC ANTIBODY SCREEN: CPT

## 2024-07-25 PROCEDURE — 80048 BASIC METABOLIC PNL TOTAL CA: CPT

## 2024-07-26 LAB
ABO GROUP (TYPE) IN BLOOD: NORMAL
ANION GAP SERPL CALC-SCNC: 14 MMOL/L (ref 10–20)
ANTIBODY SCREEN: NORMAL
BUN SERPL-MCNC: 11 MG/DL (ref 6–23)
CALCIUM SERPL-MCNC: 9.5 MG/DL (ref 8.6–10.6)
CHLORIDE SERPL-SCNC: 107 MMOL/L (ref 98–107)
CO2 SERPL-SCNC: 23 MMOL/L (ref 21–32)
CREAT SERPL-MCNC: 0.86 MG/DL (ref 0.5–1.05)
EGFRCR SERPLBLD CKD-EPI 2021: >90 ML/MIN/1.73M*2
GLUCOSE SERPL-MCNC: 122 MG/DL (ref 74–99)
POTASSIUM SERPL-SCNC: 4.8 MMOL/L (ref 3.5–5.3)
RH FACTOR (ANTIGEN D): NORMAL
SODIUM SERPL-SCNC: 139 MMOL/L (ref 136–145)

## 2024-07-29 ENCOUNTER — DOCUMENTATION (OUTPATIENT)
Dept: PHYSICAL THERAPY | Facility: CLINIC | Age: 34
End: 2024-07-29
Payer: COMMERCIAL

## 2024-07-29 ENCOUNTER — ANESTHESIA EVENT (OUTPATIENT)
Dept: OPERATING ROOM | Facility: HOSPITAL | Age: 34
End: 2024-07-29
Payer: COMMERCIAL

## 2024-07-29 ENCOUNTER — HOSPITAL ENCOUNTER (EMERGENCY)
Facility: HOSPITAL | Age: 34
Discharge: HOME | End: 2024-07-29
Payer: COMMERCIAL

## 2024-07-29 VITALS
SYSTOLIC BLOOD PRESSURE: 136 MMHG | RESPIRATION RATE: 16 BRPM | OXYGEN SATURATION: 99 % | WEIGHT: 140 LBS | TEMPERATURE: 97.7 F | BODY MASS INDEX: 23.9 KG/M2 | HEIGHT: 64 IN | HEART RATE: 75 BPM | DIASTOLIC BLOOD PRESSURE: 91 MMHG

## 2024-07-29 DIAGNOSIS — M54.31 BILATERAL SCIATICA: Primary | ICD-10-CM

## 2024-07-29 DIAGNOSIS — M54.32 BILATERAL SCIATICA: Primary | ICD-10-CM

## 2024-07-29 DIAGNOSIS — R39.15 URINARY URGENCY: ICD-10-CM

## 2024-07-29 DIAGNOSIS — E87.6 HYPOKALEMIA: ICD-10-CM

## 2024-07-29 LAB
ALBUMIN SERPL BCP-MCNC: 3.4 G/DL (ref 3.4–5)
ALP SERPL-CCNC: 106 U/L (ref 33–110)
ALT SERPL W P-5'-P-CCNC: 17 U/L (ref 7–45)
ANION GAP SERPL CALC-SCNC: 12 MMOL/L (ref 10–20)
APPEARANCE UR: CLEAR
AST SERPL W P-5'-P-CCNC: 11 U/L (ref 9–39)
BASOPHILS # BLD MANUAL: 0 X10*3/UL (ref 0–0.1)
BASOPHILS NFR BLD MANUAL: 0 %
BILIRUB SERPL-MCNC: 0.3 MG/DL (ref 0–1.2)
BILIRUB UR STRIP.AUTO-MCNC: NEGATIVE MG/DL
BITE CELLS BLD QL SMEAR: PRESENT
BUN SERPL-MCNC: 13 MG/DL (ref 6–23)
BURR CELLS BLD QL SMEAR: ABNORMAL
CALCIUM SERPL-MCNC: 8.4 MG/DL (ref 8.6–10.3)
CHLORIDE SERPL-SCNC: 103 MMOL/L (ref 98–107)
CLUE CELLS SPEC QL WET PREP: NORMAL
CO2 SERPL-SCNC: 25 MMOL/L (ref 21–32)
COLOR UR: COLORLESS
CREAT SERPL-MCNC: 1.1 MG/DL (ref 0.5–1.05)
DACRYOCYTES BLD QL SMEAR: ABNORMAL
EGFRCR SERPLBLD CKD-EPI 2021: 69 ML/MIN/1.73M*2
EOSINOPHIL # BLD MANUAL: 0.48 X10*3/UL (ref 0–0.7)
EOSINOPHIL NFR BLD MANUAL: 3 %
ERYTHROCYTE [DISTWIDTH] IN BLOOD BY AUTOMATED COUNT: 12.9 % (ref 11.5–14.5)
GLUCOSE SERPL-MCNC: 103 MG/DL (ref 74–99)
GLUCOSE UR STRIP.AUTO-MCNC: NORMAL MG/DL
HCG UR QL IA.RAPID: NEGATIVE
HCT VFR BLD AUTO: 36.9 % (ref 36–46)
HGB BLD-MCNC: 12.5 G/DL (ref 12–16)
IMM GRANULOCYTES # BLD AUTO: 0.46 X10*3/UL (ref 0–0.7)
IMM GRANULOCYTES NFR BLD AUTO: 2.9 % (ref 0–0.9)
KETONES UR STRIP.AUTO-MCNC: NEGATIVE MG/DL
LEUKOCYTE ESTERASE UR QL STRIP.AUTO: NEGATIVE
LYMPHOCYTES # BLD MANUAL: 4.77 X10*3/UL (ref 1.2–4.8)
LYMPHOCYTES NFR BLD MANUAL: 30 %
MAGNESIUM SERPL-MCNC: 2 MG/DL (ref 1.6–2.4)
MCH RBC QN AUTO: 27.7 PG (ref 26–34)
MCHC RBC AUTO-ENTMCNC: 33.9 G/DL (ref 32–36)
MCV RBC AUTO: 82 FL (ref 80–100)
METAMYELOCYTES # BLD MANUAL: 0.48 X10*3/UL
METAMYELOCYTES NFR BLD MANUAL: 3 %
MONOCYTES # BLD MANUAL: 0.16 X10*3/UL (ref 0.1–1)
MONOCYTES NFR BLD MANUAL: 1 %
NEUTS SEG # BLD MANUAL: 8.75 X10*3/UL (ref 1.2–7)
NEUTS SEG NFR BLD MANUAL: 55 %
NITRITE UR QL STRIP.AUTO: NEGATIVE
NRBC BLD-RTO: 0.1 /100 WBCS (ref 0–0)
PH UR STRIP.AUTO: 6 [PH]
PLATELET # BLD AUTO: 331 X10*3/UL (ref 150–450)
POLYCHROMASIA BLD QL SMEAR: ABNORMAL
POTASSIUM SERPL-SCNC: 3.1 MMOL/L (ref 3.5–5.3)
PROT SERPL-MCNC: 6.1 G/DL (ref 6.4–8.2)
PROT UR STRIP.AUTO-MCNC: NEGATIVE MG/DL
RBC # BLD AUTO: 4.52 X10*6/UL (ref 4–5.2)
RBC # UR STRIP.AUTO: NEGATIVE /UL
RBC MORPH BLD: ABNORMAL
SCHISTOCYTES BLD QL SMEAR: ABNORMAL
SODIUM SERPL-SCNC: 137 MMOL/L (ref 136–145)
SP GR UR STRIP.AUTO: 1.01
T VAGINALIS SPEC QL WET PREP: NORMAL
TOTAL CELLS COUNTED BLD: 100
UROBILINOGEN UR STRIP.AUTO-MCNC: NORMAL MG/DL
VARIANT LYMPHS # BLD MANUAL: 1.27 X10*3/UL (ref 0–0.5)
VARIANT LYMPHS NFR BLD: 8 %
WBC # BLD AUTO: 15.9 X10*3/UL (ref 4.4–11.3)
WBC VAG QL WET PREP: NORMAL
YEAST VAG QL WET PREP: NORMAL

## 2024-07-29 PROCEDURE — 85027 COMPLETE CBC AUTOMATED: CPT | Performed by: PHYSICIAN ASSISTANT

## 2024-07-29 PROCEDURE — 85007 BL SMEAR W/DIFF WBC COUNT: CPT | Performed by: PHYSICIAN ASSISTANT

## 2024-07-29 PROCEDURE — 36415 COLL VENOUS BLD VENIPUNCTURE: CPT | Performed by: PHYSICIAN ASSISTANT

## 2024-07-29 PROCEDURE — 96374 THER/PROPH/DIAG INJ IV PUSH: CPT

## 2024-07-29 PROCEDURE — 87210 SMEAR WET MOUNT SALINE/INK: CPT | Performed by: PHYSICIAN ASSISTANT

## 2024-07-29 PROCEDURE — 99284 EMERGENCY DEPT VISIT MOD MDM: CPT | Mod: 25

## 2024-07-29 PROCEDURE — 80053 COMPREHEN METABOLIC PANEL: CPT | Performed by: PHYSICIAN ASSISTANT

## 2024-07-29 PROCEDURE — 96375 TX/PRO/DX INJ NEW DRUG ADDON: CPT

## 2024-07-29 PROCEDURE — 81025 URINE PREGNANCY TEST: CPT | Performed by: PHYSICIAN ASSISTANT

## 2024-07-29 PROCEDURE — 83735 ASSAY OF MAGNESIUM: CPT | Performed by: PHYSICIAN ASSISTANT

## 2024-07-29 PROCEDURE — 2500000004 HC RX 250 GENERAL PHARMACY W/ HCPCS (ALT 636 FOR OP/ED): Performed by: PHYSICIAN ASSISTANT

## 2024-07-29 PROCEDURE — 2500000002 HC RX 250 W HCPCS SELF ADMINISTERED DRUGS (ALT 637 FOR MEDICARE OP, ALT 636 FOR OP/ED): Performed by: PHYSICIAN ASSISTANT

## 2024-07-29 PROCEDURE — 81003 URINALYSIS AUTO W/O SCOPE: CPT | Performed by: PHYSICIAN ASSISTANT

## 2024-07-29 RX ORDER — ACETAMINOPHEN 325 MG/1
975 TABLET ORAL ONCE
Status: COMPLETED | OUTPATIENT
Start: 2024-07-29 | End: 2024-07-29

## 2024-07-29 RX ORDER — POTASSIUM CHLORIDE 20 MEQ/1
40 TABLET, EXTENDED RELEASE ORAL ONCE
Status: COMPLETED | OUTPATIENT
Start: 2024-07-29 | End: 2024-07-29

## 2024-07-29 RX ORDER — METRONIDAZOLE 500 MG/1
500 TABLET ORAL 2 TIMES DAILY
Qty: 14 TABLET | Refills: 0 | Status: SHIPPED | OUTPATIENT
Start: 2024-07-29 | End: 2024-08-05

## 2024-07-29 RX ORDER — KETOROLAC TROMETHAMINE 30 MG/ML
30 INJECTION, SOLUTION INTRAMUSCULAR; INTRAVENOUS ONCE
Status: COMPLETED | OUTPATIENT
Start: 2024-07-29 | End: 2024-07-29

## 2024-07-29 RX ORDER — HYDROCODONE BITARTRATE AND ACETAMINOPHEN 5; 325 MG/1; MG/1
1 TABLET ORAL EVERY 6 HOURS PRN
Qty: 5 TABLET | Refills: 0 | Status: SHIPPED | OUTPATIENT
Start: 2024-07-29

## 2024-07-29 SDOH — HEALTH STABILITY: MENTAL HEALTH: CURRENT SMOKER: 0

## 2024-07-29 ASSESSMENT — COLUMBIA-SUICIDE SEVERITY RATING SCALE - C-SSRS
6. HAVE YOU EVER DONE ANYTHING, STARTED TO DO ANYTHING, OR PREPARED TO DO ANYTHING TO END YOUR LIFE?: NO
2. HAVE YOU ACTUALLY HAD ANY THOUGHTS OF KILLING YOURSELF?: NO
1. IN THE PAST MONTH, HAVE YOU WISHED YOU WERE DEAD OR WISHED YOU COULD GO TO SLEEP AND NOT WAKE UP?: NO

## 2024-07-29 ASSESSMENT — PAIN DESCRIPTION - ONSET: ONSET: ONGOING

## 2024-07-29 ASSESSMENT — PAIN DESCRIPTION - DESCRIPTORS: DESCRIPTORS: ACHING;SHARP

## 2024-07-29 ASSESSMENT — PAIN - FUNCTIONAL ASSESSMENT: PAIN_FUNCTIONAL_ASSESSMENT: 0-10

## 2024-07-29 ASSESSMENT — PAIN DESCRIPTION - PAIN TYPE: TYPE: ACUTE PAIN

## 2024-07-29 ASSESSMENT — PAIN DESCRIPTION - ORIENTATION: ORIENTATION: MID;LOWER

## 2024-07-29 ASSESSMENT — PAIN SCALES - GENERAL: PAINLEVEL_OUTOF10: 10 - WORST POSSIBLE PAIN

## 2024-07-29 ASSESSMENT — PAIN DESCRIPTION - LOCATION: LOCATION: BACK

## 2024-07-29 ASSESSMENT — PAIN DESCRIPTION - FREQUENCY: FREQUENCY: CONSTANT/CONTINUOUS

## 2024-07-29 NOTE — ANESTHESIA PREPROCEDURE EVALUATION
Patient: Armida Adkins    Procedure Information       Date/Time: 24 0830    Procedures:       Pelvic Floor Injection of Lidocaine (Pelvis)      Gynecologic Exam Under Anesthesia (Pelvis)    Location: [facility] A OR 04 /  [facility] OR    Surgeons: Kelsea Stanton MD            Relevant Problems   Cardiac   (+) AVNRT (AV kat re-entry tachycardia) (CMS-HCC)   (+) Atrial flutter (Multi)   (+) Supraventricular tachycardia (CMS-HCC)      Pulmonary   (+) Asthma, intermittent (HHS-HCC)      Neuro   (+) Carpal tunnel syndrome, bilateral upper limbs   (+) Carpal tunnel syndrome, left   (+) Carpal tunnel syndrome, right   (+) Cervical radiculitis   (+) Depression      GI   (+) GERD (gastroesophageal reflux disease)   (+) Irritable bowel syndrome with constipation      Musculoskeletal   (+) Carpal tunnel syndrome, bilateral upper limbs   (+) Carpal tunnel syndrome, left   (+) Carpal tunnel syndrome, right   (+) Scoliosis      HEENT   (+) Acute sinusitis   (+) Bilateral sensorineural hearing loss   (+) Chronic sinusitis   (+) Seasonal allergies   (+) Sinus headache      GYN   (+) Abnormal uterine bleeding (AUB)   (+) Endometriosis       Clinical information reviewed:                    Past Medical History:   Diagnosis Date    Acute sinusitis, unspecified 2016    Acute rhinosinusitis    Encounter for initial prescription of contraceptive pills 08/10/2018    Encounter for initial prescription of contraceptive pills    Encounter for removal of intrauterine contraceptive device 2016    Encounter for IUD removal    Encounter for screening for malignant neoplasm of cervix 2015    Pap smear for cervical cancer screening    Myopia, bilateral 2014    Bilateral myopia    Other conditions influencing health status      0    Other specified health status 2017    Contraception    Other specified symptoms and signs involving the digestive system and abdomen 2015    Difficulty swallowing  pills    Pain in right shoulder 10/07/2020    Right shoulder pain    Pelvic and perineal pain 05/28/2021    Pelvic pain    Personal history of other diseases of the digestive system 07/30/2015    History of hemorrhoids    Personal history of other diseases of the female genital tract 08/10/2018    History of vaginal discharge    Personal history of other diseases of the female genital tract 08/10/2018    History of vulvodynia    Personal history of other diseases of the female genital tract 09/11/2015    History of metrorrhagia    Personal history of other diseases of the musculoskeletal system and connective tissue 10/13/2022    History of scoliosis    Personal history of other diseases of the respiratory system     History of asthma    Personal history of other specified conditions     H/O shortness of breath    Unspecified astigmatism, bilateral 12/01/2014    Astigmatism, bilateral      Past Surgical History:   Procedure Laterality Date    INVASIVE VASCULAR PROCEDURE N/A 4/15/2024    Procedure: VENOGRAM;  Surgeon: Marianna Ordaz MD;  Location: [facility];  Service: Vascular Surgery;  Laterality: N/A;  R jugular approach, pelvic venogram. 3% STS sclerotherapy    IR ANGIOGRAM INFERIOR EPIGASTRIC  2/9/2023    IR ANGIOGRAM INFERIOR EPIGASTRIC 2/9/2023 [facility]    IR ANGIOGRAM INFERIOR EPIGASTRIC PELVIC  2/9/2023    IR ANGIOGRAM INFERIOR EPIGASTRIC PELVIC 2/9/2023 [facility]    IR ANGIOGRAM INFERIOR EPIGASTRIC PELVIC  2/9/2023    IR ANGIOGRAM INFERIOR EPIGASTRIC PELVIC 2/9/2023 [facility]ICAL [facility]    IR VENOGRAM LOWER EXTREMITY Left 12/11/2023    IR VENOGRAM LOWER EXTREMITY 12/11/2023 Marianna Ordaz MD [facility]GIO    OTHER SURGICAL HISTORY  08/18/2020    Removal    OTHER SURGICAL HISTORY  07/31/2015    Surgery Suture Of Gum Laceration     Social History     Tobacco Use    Smoking status: Never    Smokeless tobacco: Never   Substance Use Topics    Alcohol use: Defer    Drug use: Defer       Current Outpatient Medications   Medication Instructions    albuterol 90 mcg/actuation inhaler 1-2 puffs, inhalation, Every 4 hours PRN    albuterol 2.5 mg, inhalation, Every 4 hours PRN    cyclobenzaprine (Flexeril) 10 mg tablet TAKE ONE TABLET BY MOUTH AS NEEDED AT BEDTIME FOR MUSLE SPASMS    diazePAM (VALIUM) 5 mg, oral, Nightly PRN    estradiol (Estrace) 0.01 % (0.1 mg/gram) vaginal cream PLACE A PEA SIZED AMOUNT ON YOUR FINGER AND APPLY TO SORE AREA OF VULVA. USE NIGHTLY FOR ONE MONTH.    etonogestreL-ethinyl estradioL (Nuvaring) 0.12-0.015 mg/24 hr vaginal ring 1 each, vaginal, Every 28 days    HYDROcodone-acetaminophen (Norco) 5-325 mg tablet 1 tablet, oral, Every 6 hours PRN    hydrocortisone 2.5 % cream Apply twice daily to affected areas    lactulose 10 g, oral, 2 times daily PRN    metroNIDAZOLE (FLAGYL) 500 mg, oral, 2 times daily    mometasone (Nasonex) 50 mcg/actuation nasal spray 2 sprays, Each Nostril, 2 times daily    naproxen (Naprosyn) 500 mg tablet TAKE ONE TABLET BY MOUTH TWO TIMES A DAY AS NEEDED FOR MILD PAIN    predniSONE (Deltasone) 20 mg tablet Take 1 tablet (20 mg) by mouth 3 times a day for 5 days, THEN 1 tablet (20 mg) 2 times a day for 5 days, THEN 1 tablet (20 mg) once daily for 5 days.      Allergies   Allergen Reactions    Benzonatate Unknown    Coconut Itching    Erythromycin-Benzoyl Peroxide Unknown    Fruit Flavor Unknown    Peach Itching    Pineapple Itching    Pollen Extracts Unknown    Sulfamethoxazole-Trimethoprim Swelling        Chemistry    Lab Results   Component Value Date/Time     07/29/2024 0427    K 3.1 (L) 07/29/2024 0427     07/29/2024 0427    CO2 25 07/29/2024 0427    BUN 13 07/29/2024 0427    CREATININE 1.10 (H) 07/29/2024 0427    Lab Results   Component Value Date/Time    CALCIUM 8.4 (L) 07/29/2024 0427    ALKPHOS 106 07/29/2024 0427    AST 11 07/29/2024 0427    ALT 17 07/29/2024 0427    BILITOT 0.3 07/29/2024 0427          Lab Results   Component  "Value Date    HGBA1C 4.7 12/08/2022     Lab Results   Component Value Date/Time    WBC 15.9 (H) 07/29/2024 0427    HGB 12.5 07/29/2024 0427    HCT 36.9 07/29/2024 0427     07/29/2024 0427     Lab Results   Component Value Date/Time    PROTIME 13.9 (H) 04/07/2023 1510    INR 1.2 (H) 04/07/2023 1510     No results found for: \"ABORH\"    No results found for this or any previous visit from the past 1095 days.       Visit Vitals  LMP 05/09/2024 (Exact Date) Comment: PT reported 16 day menses for may   OB Status Having periods   Smoking Status Never     No data recorded     Physical Exam    Airway  Mallampati: II  TM distance: >3 FB  Neck ROM: full     Cardiovascular - normal exam     Dental - normal exam     Pulmonary - normal exam     Abdominal - normal exam              Anesthesia Plan    History of general anesthesia?: yes  History of complications of general anesthesia?: no    ASA 2     MAC     The patient is not a current smoker.    intravenous induction   Postoperative administration of opioids is intended.  Anesthetic plan and risks discussed with patient.  Use of blood products discussed with patient who.    Plan discussed with CAA.        "

## 2024-07-29 NOTE — ED TRIAGE NOTES
Patient stated she had been dealing with lower back pain that extended into both her legs. The patient stated she had a history of scoliosis and nerve pain in her neck. Patient also complains of increased urge to urinate and an increased odor.

## 2024-07-29 NOTE — DISCHARGE INSTRUCTIONS
Please continue Tylenol and/or ibuprofen as needed for pain.  May take Norco for breakthrough pain.  This is a narcotic.  Do not drive, drink alcohol or operate heavy machinery when taking this medication.  Do not use other sedating medicines while using this medication.  Increase water and fiber intake.  Kidney showed that she seemed a little hydrated today.  Drink extra water and fluids over the next few days.  Follow-up with primary care provider or PMR/physical therapy as needed.  If BV or Trichomonas is positive begin metronidazole.  Medication twice per day x 7 days.  Complete full course of antibiotics even if symptoms improve.  If you have to take this medicine do not drink alcohol while taking it or can cause you to break out in a rash.  Return to ER for any new or worsening symptoms.

## 2024-07-29 NOTE — ED PROVIDER NOTES
"Chief Complaint   Patient presents with    Back Pain     HPI:   Armida Adkins is an 34 y.o. with complicated PMH including GERD, migraine disorder, endometriosis, anemia, SVT, vitamin deficiency, chronic neck pain, asthma that presents to the ED for evaluation of back pain.  She said pain began on Thursday.  Denies any trauma or injury.  Localizes it to her bilateral buttock and radiates down both legs.  She says pain is worse with sitting, standing, lying on her back.  She describes it as \"sharp and sore\".  Rates it 9/10.  Took naproxen yesterday around 8 PM with no relief.  She said she tried to get in the hot tub and it made it worse.  She tried to elevate her legs and her toes began to tingle.  She denies any numbness, muscle weakness, loss of bowel or bladder, urinary retention, saddle anesthesia, history of IVDA or cancer, fever, chills, chest pain, shortness of breath or weight loss.  Does endorse urinary frequency, urgency and foul urinary odor.  She is on oral contraceptives.  LMP was May.  Patient states that she was recently on Medrol Dosepak for neck pain and upper extremity paresthesias    Medications:  Soc HX:  Allergies   Allergen Reactions    Benzonatate Unknown    Coconut Itching    Erythromycin-Benzoyl Peroxide Unknown    Fruit Flavor Unknown    Peach Itching    Pineapple Itching    Pollen Extracts Unknown    Sulfamethoxazole-Trimethoprim Swelling   :  Past Medical History:   Diagnosis Date    Acute sinusitis, unspecified 02/01/2016    Acute rhinosinusitis    Encounter for initial prescription of contraceptive pills 08/10/2018    Encounter for initial prescription of contraceptive pills    Encounter for removal of intrauterine contraceptive device 03/01/2016    Encounter for IUD removal    Encounter for screening for malignant neoplasm of cervix 11/24/2015    Pap smear for cervical cancer screening    Myopia, bilateral 12/01/2014    Bilateral myopia    Other conditions influencing health status  "     0    Other specified health status 2017    Contraception    Other specified symptoms and signs involving the digestive system and abdomen 2015    Difficulty swallowing pills    Pain in right shoulder 10/07/2020    Right shoulder pain    Pelvic and perineal pain 2021    Pelvic pain    Personal history of other diseases of the digestive system 2015    History of hemorrhoids    Personal history of other diseases of the female genital tract 08/10/2018    History of vaginal discharge    Personal history of other diseases of the female genital tract 08/10/2018    History of vulvodynia    Personal history of other diseases of the female genital tract 2015    History of metrorrhagia    Personal history of other diseases of the musculoskeletal system and connective tissue 10/13/2022    History of scoliosis    Personal history of other diseases of the respiratory system     History of asthma    Personal history of other specified conditions     H/O shortness of breath    Unspecified astigmatism, bilateral 2014    Astigmatism, bilateral     Past Surgical History:   Procedure Laterality Date    INVASIVE VASCULAR PROCEDURE N/A 4/15/2024    Procedure: VENOGRAM;  Surgeon: Marianna Ordaz MD;  Location: [facility];  Service: Vascular Surgery;  Laterality: N/A;  R jugular approach, pelvic venogram. 3% STS sclerotherapy    IR ANGIOGRAM INFERIOR EPIGASTRIC  2023    IR ANGIOGRAM INFERIOR EPIGASTRIC 2023 [facility] CLINICAL LEGACY    IR ANGIOGRAM INFERIOR EPIGASTRIC PELVIC  2023    IR ANGIOGRAM INFERIOR EPIGASTRIC PELVIC 2023 [facility] CLINICAL LEGACY    IR ANGIOGRAM INFERIOR EPIGASTRIC PELVIC  2023    IR ANGIOGRAM INFERIOR EPIGASTRIC PELVIC 2023 [facility] CLINICAL LEGACY    IR VENOGRAM LOWER EXTREMITY Left 2023    IR VENOGRAM LOWER EXTREMITY 2023 Marianna Ordaz [place] [facility] [place]    OTHER SURGICAL HISTORY  2020    Removal    OTHER SURGICAL HISTORY  2015     Surgery Suture Of Gum Laceration     Family History   Problem Relation Name Age of Onset    Uterine cancer Mother      Seizures Mother      Other (cerebrovascular accident) Father      Uterine cancer Sister      Colon cancer Mother's Brother        Physical Exam  Vitals and nursing note reviewed.   Constitutional:       General: She is not in acute distress.     Appearance: Normal appearance. She is not ill-appearing or toxic-appearing.      Comments: Patient lying prone half on the bed with right leg outstretched and half off the bed with left leg bent and forward step position with foot on the floor   HENT:      Right Ear: External ear normal.      Left Ear: External ear normal.      Mouth/Throat:      Mouth: Mucous membranes are moist.   Eyes:      Pupils: Pupils are equal, round, and reactive to light.   Cardiovascular:      Rate and Rhythm: Normal rate and regular rhythm.      Pulses: Normal pulses.      Heart sounds: Normal heart sounds.   Pulmonary:      Effort: Pulmonary effort is normal.      Breath sounds: Normal breath sounds.   Abdominal:      General: Bowel sounds are normal.      Palpations: Abdomen is soft.      Tenderness: There is no abdominal tenderness. There is no right CVA tenderness, left CVA tenderness, guarding or rebound.   Genitourinary:     Comments: Chaperone FERMÍN Tello.  Normal external vagina.  Copious thin milky white vaginal discharge noted in vaginal vault.  Musculoskeletal:         General: Normal range of motion.      Cervical back: Normal range of motion. No tenderness.      Comments: 5/5 strength bilateral upper and lower extremities   Skin:     General: Skin is warm and dry.      Capillary Refill: Capillary refill takes less than 2 seconds.   Neurological:      General: No focal deficit present.      Mental Status: She is alert.      Cranial Nerves: No cranial nerve deficit.      Sensory: No sensory deficit.      Gait: Gait normal.      Comments: Positive straight leg raise  bilaterally.  No midline TTP of C/T/L-spine.    Denying red flag symptoms including IV drug use, alcohol abuse, diabetes, renal failure, cancer, spinal surgeries, fevers, night pain, immunosuppression, incontinence, retention     L1-L2 cremasteric reflex: Not tested   L2 adduct thigh, cross legs: Intact   L3 extend knee: Intact   L4 dorsiflex ankle (up): Intact   L5 point great toe up: Intact   S1 flex knee: Intact   S2 plantarflex toes: Intact   S3-5: Groin/perianal sensation: Sensation to touch intact.  Did not assess rectal tone.     T1 move fingers apart: Intact   T2-12 trunk sensation: Intact     C1-2, 3, 4 sensation to head and neck: Intact   C5 deltoid motor: Intact   C6 biceps motor: Intact   C7 extension of the wrist and fingers: Intact    C8 flex fingers: Intact     VS: As documented in the triage note and EMR flowsheet from this visit were reviewed.    External Records Reviewed: I reviewed recent and relevant outside records including: Patient is scheduled for pelvic floor injection of lidocaine tomorrow.  Reviewed Ortho note 7/23/2024.  She is seen by Ortho for cervical radiculopathy.  Recommended gabapentin, Toradol, PT, Tylenol.  MRI of C-spine was ordered.      Medical Decision Making:   ED Course as of 07/29/24 0554   Mon Jul 29, 2024   0340 Vitals Reviewed: Afebrile. Normotensive. Not tachycardic nor tachypneic. No hypoxia.   [KA]   4378 Patient is a 34-year-old female presents to the ED for evaluation of atraumatic low back pain.  On exam she has no midline spinal tenderness.  No step-off.  She has positive straight leg raise bilaterally.  Otherwise normal neuroexam and high-sensitivity neuroexam is not concerning for cauda equina or spinal epidural abscess.  Do not feel she necessitates urgent MRI imaging.  She is endorsing some urinary symptoms and subjective paresthesias.  Will obtain labs, urine.  Patient to be given IV Toradol and IV Solu-Medrol because she says she cannot swallow pills. [KA]    0429 Viewed [facility] with no concerning findings [KA]   0503 I personally viewed labs.  CBC shows leukocytosis 15.9 which is improved from 4 days ago.  Patient has recently been on Medrol Dosepak.  Urine without signs of infection.  Magnesium normal.  CMP shows creatinine 1.10 which is increased from 0.80 4 days ago.  Mild hypokalemia with potassium 3.1.  I have ordered 1 L normal saline and 40 mEq of repletion.  Liver function normal. [KA]   0535 Patient already recently on steroids.  Did not want to prescribe another steroid pack.  She will be sent home with short course of Norco.  Recommended continue Tylenol and NSAIDs.  Follow-up with primary care provider and/or PMR.  When discussing results we did discuss obtaining wet prep for evaluation of BV given her follow-up vaginal odor and urinary urgency.  Will send home with paper prescription for Flagyl.  Advised her that if BV is positive she should fill prescription and complete full course of antibiotics.  Also advise no alcohol if she does have to take it. [KA]      ED Course User Index  [KA] Karolyn Joseph PA-C         Diagnoses as of 07/29/24 0554   Bilateral sciatica   Hypokalemia   Urinary urgency      Escalation of Care: Appropriate for outpatient manage    Counseling: Spoke with the patient and discussed today´s findings, in addition to providing specific details for the plan of care and expected course.  Patient was given the opportunity to ask questions.    Discussed return precautions and importance of follow-up.  Advised to follow-up with PCP and/or PMR.  Advised to return to the ED for changing or worsening symptoms, new symptoms, complaint specific precautions, and precautions listed on the discharge paperwork.  Educated on the common potential side effects of medications prescribed.    I advised the patient that the emergency evaluation and treatment provided today doesn't end their need for medical care. It is very important that they follow-up  with their primary care provider or other specialist as instructed.    The plan of care was mutually agreed upon with the patient. The patient and/or family were given the opportunity to ask questions. All questions asked today in the ED were answered to the best of my ability with today's information.    I specifically advised the patient to return to the ED for changing or worsening symptoms, worrisome new symptoms, or for any complaint specific precautions listed on the discharge paperwork.    This patient was cared for in the setting of nationwide stress on resources and staffing.    This report was transcribed using voice recognition software.  Every effort was made to ensure accuracy, however, inadvertently computerized transcription errors may be present.       Karolyn Joseph PA-C  07/29/24 0558

## 2024-07-29 NOTE — PROGRESS NOTES
Physical Therapy                 Therapy Communication Note    Patient Name: Armida Adkins  MRN: [id number]  Today's Date: 7/29/2024     Discipline: Physical Therapy    Missed Visit Reason:      Missed Time: No Show    Comment: reviewed chart and patient went to ED today for back pain.  Left message regarding next appt.

## 2024-07-30 ENCOUNTER — TELEPHONE (OUTPATIENT)
Dept: OBSTETRICS AND GYNECOLOGY | Facility: CLINIC | Age: 34
End: 2024-07-30

## 2024-07-30 ENCOUNTER — ANESTHESIA (OUTPATIENT)
Dept: OPERATING ROOM | Facility: HOSPITAL | Age: 34
End: 2024-07-30
Payer: COMMERCIAL

## 2024-07-30 ENCOUNTER — HOSPITAL ENCOUNTER (OUTPATIENT)
Facility: HOSPITAL | Age: 34
Setting detail: OUTPATIENT SURGERY
Discharge: HOME | End: 2024-07-30
Attending: STUDENT IN AN ORGANIZED HEALTH CARE EDUCATION/TRAINING PROGRAM | Admitting: STUDENT IN AN ORGANIZED HEALTH CARE EDUCATION/TRAINING PROGRAM
Payer: COMMERCIAL

## 2024-07-30 VITALS
DIASTOLIC BLOOD PRESSURE: 88 MMHG | OXYGEN SATURATION: 98 % | HEART RATE: 73 BPM | HEIGHT: 64 IN | BODY MASS INDEX: 25.63 KG/M2 | SYSTOLIC BLOOD PRESSURE: 133 MMHG | RESPIRATION RATE: 14 BRPM | WEIGHT: 150.13 LBS | TEMPERATURE: 97.2 F

## 2024-07-30 DIAGNOSIS — Z98.890 POSTOPERATIVE STATE: ICD-10-CM

## 2024-07-30 DIAGNOSIS — M79.18 MYALGIA, OTHER SITE: ICD-10-CM

## 2024-07-30 DIAGNOSIS — N94.819 VULVODYNIA: ICD-10-CM

## 2024-07-30 DIAGNOSIS — R10.2 PELVIC PAIN: Primary | ICD-10-CM

## 2024-07-30 LAB
ABO GROUP (TYPE) IN BLOOD: NORMAL
ANTIBODY SCREEN: NORMAL
PREGNANCY TEST URINE, POC: NEGATIVE
RH FACTOR (ANTIGEN D): NORMAL

## 2024-07-30 PROCEDURE — 20553 NJX 1/MLT TRIGGER POINTS 3/>: CPT | Performed by: STUDENT IN AN ORGANIZED HEALTH CARE EDUCATION/TRAINING PROGRAM

## 2024-07-30 PROCEDURE — 2500000004 HC RX 250 GENERAL PHARMACY W/ HCPCS (ALT 636 FOR OP/ED): Performed by: ANESTHESIOLOGIST ASSISTANT

## 2024-07-30 PROCEDURE — 7100000001 HC RECOVERY ROOM TIME - INITIAL BASE CHARGE: Performed by: STUDENT IN AN ORGANIZED HEALTH CARE EDUCATION/TRAINING PROGRAM

## 2024-07-30 PROCEDURE — 2500000005 HC RX 250 GENERAL PHARMACY W/O HCPCS: Performed by: ANESTHESIOLOGY

## 2024-07-30 PROCEDURE — 3600000003 HC OR TIME - INITIAL BASE CHARGE - PROCEDURE LEVEL THREE: Performed by: STUDENT IN AN ORGANIZED HEALTH CARE EDUCATION/TRAINING PROGRAM

## 2024-07-30 PROCEDURE — 86901 BLOOD TYPING SEROLOGIC RH(D): CPT | Performed by: STUDENT IN AN ORGANIZED HEALTH CARE EDUCATION/TRAINING PROGRAM

## 2024-07-30 PROCEDURE — 3600000008 HC OR TIME - EACH INCREMENTAL 1 MINUTE - PROCEDURE LEVEL THREE: Performed by: STUDENT IN AN ORGANIZED HEALTH CARE EDUCATION/TRAINING PROGRAM

## 2024-07-30 PROCEDURE — 3700000002 HC GENERAL ANESTHESIA TIME - EACH INCREMENTAL 1 MINUTE: Performed by: STUDENT IN AN ORGANIZED HEALTH CARE EDUCATION/TRAINING PROGRAM

## 2024-07-30 PROCEDURE — 7100000010 HC PHASE TWO TIME - EACH INCREMENTAL 1 MINUTE: Performed by: STUDENT IN AN ORGANIZED HEALTH CARE EDUCATION/TRAINING PROGRAM

## 2024-07-30 PROCEDURE — 81025 URINE PREGNANCY TEST: CPT | Performed by: STUDENT IN AN ORGANIZED HEALTH CARE EDUCATION/TRAINING PROGRAM

## 2024-07-30 PROCEDURE — A20553 PR INJECT TRIGGER POINTS, > 3: Performed by: ANESTHESIOLOGIST ASSISTANT

## 2024-07-30 PROCEDURE — 7100000002 HC RECOVERY ROOM TIME - EACH INCREMENTAL 1 MINUTE: Performed by: STUDENT IN AN ORGANIZED HEALTH CARE EDUCATION/TRAINING PROGRAM

## 2024-07-30 PROCEDURE — 7100000009 HC PHASE TWO TIME - INITIAL BASE CHARGE: Performed by: STUDENT IN AN ORGANIZED HEALTH CARE EDUCATION/TRAINING PROGRAM

## 2024-07-30 PROCEDURE — 2500000004 HC RX 250 GENERAL PHARMACY W/ HCPCS (ALT 636 FOR OP/ED): Mod: JZ | Performed by: STUDENT IN AN ORGANIZED HEALTH CARE EDUCATION/TRAINING PROGRAM

## 2024-07-30 PROCEDURE — 36415 COLL VENOUS BLD VENIPUNCTURE: CPT | Performed by: STUDENT IN AN ORGANIZED HEALTH CARE EDUCATION/TRAINING PROGRAM

## 2024-07-30 PROCEDURE — 3700000001 HC GENERAL ANESTHESIA TIME - INITIAL BASE CHARGE: Performed by: STUDENT IN AN ORGANIZED HEALTH CARE EDUCATION/TRAINING PROGRAM

## 2024-07-30 PROCEDURE — A20553 PR INJECT TRIGGER POINTS, > 3: Performed by: ANESTHESIOLOGY

## 2024-07-30 RX ORDER — ACETAMINOPHEN 325 MG/1
650 TABLET ORAL EVERY 6 HOURS PRN
Qty: 30 TABLET | Refills: 0 | Status: SHIPPED | OUTPATIENT
Start: 2024-07-30

## 2024-07-30 RX ORDER — OXYCODONE HYDROCHLORIDE 5 MG/1
5 TABLET ORAL EVERY 4 HOURS PRN
Status: DISCONTINUED | OUTPATIENT
Start: 2024-07-30 | End: 2024-07-30 | Stop reason: HOSPADM

## 2024-07-30 RX ORDER — MIDAZOLAM HYDROCHLORIDE 1 MG/ML
INJECTION INTRAMUSCULAR; INTRAVENOUS AS NEEDED
Status: DISCONTINUED | OUTPATIENT
Start: 2024-07-30 | End: 2024-07-30

## 2024-07-30 RX ORDER — ACETAMINOPHEN 325 MG/1
650 TABLET ORAL EVERY 4 HOURS PRN
Status: DISCONTINUED | OUTPATIENT
Start: 2024-07-30 | End: 2024-07-30 | Stop reason: HOSPADM

## 2024-07-30 RX ORDER — ONDANSETRON HYDROCHLORIDE 2 MG/ML
INJECTION, SOLUTION INTRAVENOUS AS NEEDED
Status: DISCONTINUED | OUTPATIENT
Start: 2024-07-30 | End: 2024-07-30

## 2024-07-30 RX ORDER — SODIUM CHLORIDE, SODIUM LACTATE, POTASSIUM CHLORIDE, CALCIUM CHLORIDE 600; 310; 30; 20 MG/100ML; MG/100ML; MG/100ML; MG/100ML
100 INJECTION, SOLUTION INTRAVENOUS CONTINUOUS
Status: DISCONTINUED | OUTPATIENT
Start: 2024-07-30 | End: 2024-07-30 | Stop reason: HOSPADM

## 2024-07-30 RX ORDER — LIDOCAINE HYDROCHLORIDE 10 MG/ML
0.1 INJECTION, SOLUTION EPIDURAL; INFILTRATION; INTRACAUDAL; PERINEURAL ONCE
Status: DISCONTINUED | OUTPATIENT
Start: 2024-07-30 | End: 2024-07-30 | Stop reason: HOSPADM

## 2024-07-30 RX ORDER — BUPIVACAINE HYDROCHLORIDE 5 MG/ML
INJECTION, SOLUTION EPIDURAL; INTRACAUDAL AS NEEDED
Status: DISCONTINUED | OUTPATIENT
Start: 2024-07-30 | End: 2024-07-30 | Stop reason: HOSPADM

## 2024-07-30 RX ORDER — OXYCODONE HYDROCHLORIDE 5 MG/1
5 TABLET ORAL EVERY 6 HOURS PRN
Qty: 3 TABLET | Refills: 0 | Status: SHIPPED | OUTPATIENT
Start: 2024-07-30

## 2024-07-30 RX ORDER — FENTANYL CITRATE 50 UG/ML
INJECTION, SOLUTION INTRAMUSCULAR; INTRAVENOUS AS NEEDED
Status: DISCONTINUED | OUTPATIENT
Start: 2024-07-30 | End: 2024-07-30

## 2024-07-30 RX ORDER — PROMETHAZINE HYDROCHLORIDE 25 MG/ML
6.25 INJECTION, SOLUTION INTRAMUSCULAR; INTRAVENOUS ONCE AS NEEDED
Status: DISCONTINUED | OUTPATIENT
Start: 2024-07-30 | End: 2024-07-30 | Stop reason: HOSPADM

## 2024-07-30 RX ORDER — PROPOFOL 10 MG/ML
INJECTION, EMULSION INTRAVENOUS CONTINUOUS PRN
Status: DISCONTINUED | OUTPATIENT
Start: 2024-07-30 | End: 2024-07-30

## 2024-07-30 ASSESSMENT — PAIN - FUNCTIONAL ASSESSMENT
PAIN_FUNCTIONAL_ASSESSMENT: 0-10

## 2024-07-30 ASSESSMENT — PAIN SCALES - GENERAL
PAINLEVEL_OUTOF10: 0 - NO PAIN

## 2024-07-30 NOTE — H&P
History Of Present Illness  Armida Adkins is a 34 y.o. female  with history of pelvic pain and status post TPI w/ lidocaine on 2024. She is s/p foam sclerotherapy and coil embolization of bilateral hypogastric veins w/ Dr. Ordaz on 4/15/2024 presenting for exam under anesthesia and pelvic floor lidocaine injections.     Past Medical History  Past Medical History:   Diagnosis Date    Acute sinusitis, unspecified 2016    Acute rhinosinusitis    Encounter for initial prescription of contraceptive pills 08/10/2018    Encounter for initial prescription of contraceptive pills    Encounter for removal of intrauterine contraceptive device 2016    Encounter for IUD removal    Encounter for screening for malignant neoplasm of cervix 2015    Pap smear for cervical cancer screening    Myopia, bilateral 2014    Bilateral myopia    Other conditions influencing health status      0    Other specified health status 2017    Contraception    Other specified symptoms and signs involving the digestive system and abdomen 2015    Difficulty swallowing pills    Pain in right shoulder 10/07/2020    Right shoulder pain    Pelvic and perineal pain 2021    Pelvic pain    Personal history of other diseases of the digestive system 2015    History of hemorrhoids    Personal history of other diseases of the female genital tract 08/10/2018    History of vaginal discharge    Personal history of other diseases of the female genital tract 08/10/2018    History of vulvodynia    Personal history of other diseases of the female genital tract 2015    History of metrorrhagia    Personal history of other diseases of the musculoskeletal system and connective tissue 10/13/2022    History of scoliosis    Personal history of other diseases of the respiratory system     History of asthma    Personal history of other specified conditions     H/O shortness of breath    Unspecified astigmatism,  bilateral 12/01/2014    Astigmatism, bilateral       Surgical History  Past Surgical History:   Procedure Laterality Date    INVASIVE VASCULAR PROCEDURE N/A 4/15/2024    Procedure: VENOGRAM;  Surgeon: Marianna Ordaz MD;  Location: [facility];  Service: Vascular Surgery;  Laterality: N/A;  R jugular approach, pelvic venogram. 3% STS sclerotherapy    IR ANGIOGRAM INFERIOR EPIGASTRIC  2/9/2023    IR ANGIOGRAM INFERIOR EPIGASTRIC 2/9/2023 [facility] CLINICAL LEGACY    IR ANGIOGRAM INFERIOR EPIGASTRIC PELVIC  2/9/2023    IR ANGIOGRAM INFERIOR EPIGASTRIC PELVIC 2/9/2023 [facility] CLINICAL LEGACY    IR ANGIOGRAM INFERIOR EPIGASTRIC PELVIC  2/9/2023    IR ANGIOGRAM INFERIOR EPIGASTRIC PELVIC 2/9/2023 [facility] CLINICAL LEGACY    IR VENOGRAM LOWER EXTREMITY Left 12/11/2023    IR VENOGRAM LOWER EXTREMITY 12/11/2023 Marianna Ordaz MD [facility] ANGIO    OTHER SURGICAL HISTORY  08/18/2020    Removal    OTHER SURGICAL HISTORY  07/31/2015    Surgery Suture Of Gum Laceration        Social History  She reports that she has never smoked. She has never used smokeless tobacco. Alcohol use questions deferred to the physician. Drug use questions deferred to the physician.    Family History  Family History   Problem Relation Name Age of Onset    Uterine cancer Mother      Seizures Mother      Other (cerebrovascular accident) Father      Uterine cancer Sister      Colon cancer Mother's Brother          Allergies  Benzonatate, Coconut, Erythromycin-benzoyl peroxide, Fruit flavor, Peach, Pineapple, Pollen extracts, and Sulfamethoxazole-trimethoprim    Review of Systems     Physical Exam     Last Recorded Vitals  Last menstrual period 05/09/2024.      Assessment/Plan   Principal Problem:    Myalgia, other site  Active Problems:    Pelvic pain      34 y.o. female  with history of pelvic pain presenting for exam under anesthesia and pelvic floor lidocaine injections.    - To OR for procedure as above          Seen and discussed with Dr. Maximino Fontaine,  MD PGY-3

## 2024-07-30 NOTE — NURSING NOTE
0915: Handoff received from Quincy KOVACS, assumed care for patient at this time    0917: Oral airway removed at this time    0928: Patient tolerating sips of apple juice    0935: Patient tolerating pretzels at this time    0959: Patient meets phase one discharge criteria at this time    1000: Patient to phase two at this time in stable condition with all belongings. Handoff given to Cindy KOVACS.

## 2024-07-30 NOTE — PERIOPERATIVE NURSING NOTE
1000 Arrival to Phase 2. Awake and alert.  brought to bedside.    1015 Discharge instructions reviewed with pt and , verbalized understanding. PIV removed and pt getting dressed.     1036 Transported to [facility] via wheelchair and discharged to home with .

## 2024-07-30 NOTE — OP NOTE
Pelvic Floor Trigger Point Injection, Gynecologic Exam Under Anesthesia Operative Note     Date: 2024  OR Location: [facility] OR    Name: Armida Adkins, : 1990, Age: 34 y.o., MRN: [id number], Sex: female    Diagnosis  Pre-op Diagnosis      * Pelvic pain [R10.2]     * Myalgia, other site [M79.18] Post-op Diagnosis     * Pelvic pain [R10.2]     * Myalgia, other site [M79.18]     Procedures  Pelvic Floor Trigger Point Injection   - WI INJECTION SINGLE/MLT TRIGGER POINT 3/> MUSCLES    Gynecologic Exam Under Anesthesia  74000 - WI PELVIC EXAMINATION W/ANESTHESIA OTHER THAN LOCAL      Surgeons      * Kelsea Stanton - Primary    Resident/Fellow/Other Assistant:  Surgeons and Role:  * No surgeons found with a matching role *    Procedure Summary  Anesthesia: Monitor Anesthesia Care  ASA: II  Anesthesia Staff: Anesthesiologist: Augustus Hanna MD  C-AA: TRA Gonzalez  Estimated Blood Loss: 5 mL  Intra-op Medications:   Administrations occurring from 0830 to 0930 on 24:   Medication Name Total Dose   bupivacaine PF (Marcaine) 0.5 % (5 mg/mL) injection 20 mL              Anesthesia Record               Intraprocedure I/O Totals       None           Specimen: No specimens collected     Staff:   Circulator: Nico Hernandezub Person: Jodie         Drains and/or Catheters: * None in log *    Findings: Grossly normal external genitalia and vaginal mucosa    Indications: Armida Adkins is an 34 y.o. female who is having surgery for Pelvic pain [R10.2]  Myalgia, other site [M79.18].     Patient was brought to the operating room where anesthesia was established. [facility] Vaginal prep was performed. Pelvic floor muscle exam was performed demonstrating tenderness and spasm of R and L levators and R OI. A total of 20 cc of 5% Marcaine was then injected in bilateral LA and OI muscles.  Post procedure observation for bleeding showed excellent hemostasis. Patient  tolerated the procedure well.     Complications:  None; patient tolerated the procedure well.    Disposition: PACU - hemodynamically stable.  Condition: stable       Kelsea Stanton  Phone Number: [phone]

## 2024-07-30 NOTE — TELEPHONE ENCOUNTER
Patient called overnight. She had a trigger point injection today. She reports that she is able to void a large amount of urine but does not feel like she is emptying completely. Still feels some urgency.     Recommend coming in overnight if unable to void. Otherwise. Will have Dr. Stanton's office reach out to her tomorrow.

## 2024-07-30 NOTE — ANESTHESIA POSTPROCEDURE EVALUATION
Patient: Armida Huttonulding    Procedure Summary       Date: 07/30/24 Room / Location: [facility] A OR 04 / Virtual [facility] OR    Anesthesia Start: 0827 Anesthesia Stop: 0903    Procedures:       Pelvic Floor Trigger Point Injection (Pelvis)      Gynecologic Exam Under Anesthesia (Pelvis) Diagnosis:       Pelvic pain      Myalgia, other site      (Pelvic pain [R10.2])      (Myalgia, other site [M79.18])    Surgeons: Kelsea Stanton MD Responsible Provider: Augustus Hanna MD    Anesthesia Type: MAC ASA Status: 2            Anesthesia Type: MAC    Vitals Value Taken Time   /90 07/30/24 0946   Temp 36 °C (96.8 °F) 07/30/24 0859   Pulse 72 07/30/24 0955   Resp 14 07/30/24 0945   SpO2 99 % 07/30/24 0955   Vitals shown include unfiled device data.    Anesthesia Post Evaluation    Patient location during evaluation: PACU  Patient participation: complete - patient participated  Level of consciousness: awake  Pain management: adequate  Airway patency: patent  Cardiovascular status: acceptable  Respiratory status: acceptable  Hydration status: acceptable  Postoperative Nausea and Vomiting: none        No notable events documented.

## 2024-07-31 ENCOUNTER — LAB (OUTPATIENT)
Dept: LAB | Facility: LAB | Age: 34
End: 2024-07-31
Payer: COMMERCIAL

## 2024-07-31 ENCOUNTER — TELEPHONE (OUTPATIENT)
Dept: OBSTETRICS AND GYNECOLOGY | Facility: CLINIC | Age: 34
End: 2024-07-31
Payer: COMMERCIAL

## 2024-07-31 DIAGNOSIS — R30.0 DYSURIA: ICD-10-CM

## 2024-07-31 DIAGNOSIS — R30.0 DYSURIA: Primary | ICD-10-CM

## 2024-07-31 PROCEDURE — 81001 URINALYSIS AUTO W/SCOPE: CPT

## 2024-07-31 NOTE — TELEPHONE ENCOUNTER
Called patient to follow up from on call provider.  Patient states she is urinating more than normal, normal to large amounts of urine. Patient states very mild discomfort when going to the bathroom, denies burning with urination or odor. Discussed with Dr. Stanton will have patient get UA with reflex. Left message back with patient to review plan.

## 2024-08-01 ENCOUNTER — APPOINTMENT (OUTPATIENT)
Dept: PHYSICAL THERAPY | Facility: CLINIC | Age: 34
End: 2024-08-01
Payer: COMMERCIAL

## 2024-08-01 LAB
APPEARANCE UR: CLEAR
BILIRUB UR STRIP.AUTO-MCNC: NEGATIVE MG/DL
COLOR UR: ABNORMAL
GLUCOSE UR STRIP.AUTO-MCNC: NORMAL MG/DL
KETONES UR STRIP.AUTO-MCNC: NEGATIVE MG/DL
LEUKOCYTE ESTERASE UR QL STRIP.AUTO: NEGATIVE
MUCOUS THREADS #/AREA URNS AUTO: NORMAL /LPF
NITRITE UR QL STRIP.AUTO: NEGATIVE
PH UR STRIP.AUTO: 6.5 [PH]
PROT UR STRIP.AUTO-MCNC: NEGATIVE MG/DL
RBC # UR STRIP.AUTO: ABNORMAL /UL
RBC #/AREA URNS AUTO: NORMAL /HPF
SP GR UR STRIP.AUTO: 1.01
SQUAMOUS #/AREA URNS AUTO: NORMAL /HPF
UROBILINOGEN UR STRIP.AUTO-MCNC: NORMAL MG/DL
WBC #/AREA URNS AUTO: NORMAL /HPF

## 2024-08-02 ENCOUNTER — APPOINTMENT (OUTPATIENT)
Dept: PHYSICAL THERAPY | Facility: CLINIC | Age: 34
End: 2024-08-02
Payer: COMMERCIAL

## 2024-08-05 ENCOUNTER — TREATMENT (OUTPATIENT)
Dept: PHYSICAL THERAPY | Facility: CLINIC | Age: 34
End: 2024-08-05
Payer: COMMERCIAL

## 2024-08-05 DIAGNOSIS — M54.12 CERVICAL RADICULITIS: ICD-10-CM

## 2024-08-05 PROCEDURE — 97140 MANUAL THERAPY 1/> REGIONS: CPT | Mod: GP | Performed by: PHYSICAL THERAPIST

## 2024-08-05 PROCEDURE — 97110 THERAPEUTIC EXERCISES: CPT | Mod: GP | Performed by: PHYSICAL THERAPIST

## 2024-08-05 NOTE — PROGRESS NOTES
PHYSICAL THERAPY   TREATMENT NOTE    Patient Name:  Armida Adkins   Patient MRN: [id number]  Date: 8/5/2024    Time Calculation  Start Time: 1220  Stop Time: 1300  Time Calculation (min): 40 min    Insurance:  Insurance Type: [employer]  Visit number: 5  Approved # of visits 20 needs auth after 12v  Authorization Needed: yes  Cert Date Ends On:     General   Reason for visit: Cervical pain  Referred by: Dr Mueller    Therapy diagnoses:   Problem List Items Addressed This Visit             ICD-10-CM    Cervical radiculitis (Chronic) M54.12     Assessment:    Pt has widespread tightness to deep pelvic floor muscles along with tightness to bilateral adductors .  Pt has difficulty adarsh pelvic floor but palpable contraction with cues for elevator lift/lower.  Started patient with stretches to pelvic floor.  Will focus on down training pelvic floor, constipation management, and strength/stabilization of core/hips.     Plan:  Follow up regarding new pelvic floor stretches  Add in core and hip strength/stabilization  C/s treatment as appropriate       Subjective  Pt went to ER Monday for back pain on left side and was told that this from her kidneys.  Was told to just drink more water but pain has not improved.  Left arm pain is gone but left index finger is still hurting and cramping.  Yesterday was walking and felt a sharp pain to pubic region.      Pt request pelvic floor focus today.    - Pain (0-10): 3/10    Precautions  PMH: asthma  Fall Risk: none    Objective  Lumbar AROM loss:  Flexion: min  Extension: min  RSB: nil  LSB: nil    SLS: > 20 seconds with level pelvis    Flexibility:   Min loss B hamstrings  Nil loss B quads  Mod loss B adductors    Strength:  Hip flexion: R 5, L 5  Hip abduction: R 3+, L 3+  Hip Extension: R 4-, L 4-    Abdominal activation:  Palpable TA contraction     Other: tight to B adductors with tenderness distally R/L     Pelvic Floor:    External:    Atrophy/erythema/prolapse/hemorrhoids: negative to all  Tender to levator ani with external palpation (left > right)     Internal:  Strength: vaginal   P: 2  E: -  R: -  F: -    Palpation: widespread tenderness to palpation deep pelvic floor muscles, B OI    Treatment Performed:   Therapeutic Exercise:   Happy baby  Supine butterfly  Child's pose  Diaphragmatic breathing    Manual Therapy:   LE and core assessment including internal vaginal and external assessment of pelvic floor muscles- verbal consent obtained     Neuromuscular Re-education:     Gait Training:      Modalities: minutes         PT Therapeutic Procedures Time Entry  Manual Therapy Time Entry: 25  Therapeutic Exercise Time Entry: 15

## 2024-08-06 ENCOUNTER — HOSPITAL ENCOUNTER (OUTPATIENT)
Dept: RADIOLOGY | Facility: HOSPITAL | Age: 34
Discharge: HOME | End: 2024-08-06
Payer: COMMERCIAL

## 2024-08-06 DIAGNOSIS — M54.12 CERVICAL RADICULITIS: ICD-10-CM

## 2024-08-06 PROCEDURE — 72141 MRI NECK SPINE W/O DYE: CPT | Performed by: RADIOLOGY

## 2024-08-06 PROCEDURE — 72141 MRI NECK SPINE W/O DYE: CPT

## 2024-08-08 ENCOUNTER — TREATMENT (OUTPATIENT)
Dept: PHYSICAL THERAPY | Facility: CLINIC | Age: 34
End: 2024-08-08
Payer: COMMERCIAL

## 2024-08-08 DIAGNOSIS — M54.12 CERVICAL RADICULITIS: ICD-10-CM

## 2024-08-08 PROCEDURE — 97535 SELF CARE MNGMENT TRAINING: CPT | Mod: GP | Performed by: PHYSICAL THERAPIST

## 2024-08-08 PROCEDURE — 97140 MANUAL THERAPY 1/> REGIONS: CPT | Mod: GP | Performed by: PHYSICAL THERAPIST

## 2024-08-08 NOTE — PROGRESS NOTES
PHYSICAL THERAPY   TREATMENT NOTE    Patient Name:  Armida Adkins   Patient MRN: [id number]  Date: 2024    Time Calculation  Start Time: 1218  Stop Time: 1305  Time Calculation (min): 47 min    Insurance:  Insurance Type: [employer] [facility]  Visit number: 6  Approved # of visits 20 needs auth after 12v  Authorization Needed: yes  Cert Date Ends On:     General   Reason for visit: Cervical pain  Referred by: Dr Mueller    Therapy diagnoses:   Problem List Items Addressed This Visit             ICD-10-CM    Cervical radiculitis (Chronic) M54.12     Assessment:    Focused on manual trigger point release today as patient has had increased pelvic pain since last visit.  Pt tolerates treatment well and open to performing at home.      Plan:  Follow up with manual work and wand at home   Add in core and hip strength/stabilization  C/s treatment as appropriate       Subjective  Pt states she had butt pain while driving yesterday and the day before had but and pubic pain.  Had MRI of neck completed but has to call and make appointment to discuss with MD.     - Pain (0-10): 3/10    Precautions  PMH: asthma  Fall Risk: none    Objective        Treatment Performed:   Therapeutic Exercise:       Manual Therapy:   - TPR to all layers of pelvic floor, widespread  - Education on how to perform at home with wand    Neuromuscular Re-education:     Gait Training:      Modalities: minutes    Self care:  -TPR and internal stretching using pelvic wand  - lubricants  - constipation management: food, water intake, benefiber          PT Therapeutic Procedures Time Entry  Manual Therapy Time Entry: 27  Self-Care/Home Mgmt Trainin

## 2024-08-12 ENCOUNTER — APPOINTMENT (OUTPATIENT)
Dept: ORTHOPEDIC SURGERY | Facility: CLINIC | Age: 34
End: 2024-08-12
Payer: COMMERCIAL

## 2024-08-12 NOTE — PROGRESS NOTES
Division of Minimally Invasive Gynecologic Surgery  [facility]    24 Gynecology Visit    CC: No chief complaint on file.      Armida Adkins is a 34 y.o. who is a patient of Dr. Stanton    She had Trigger point injection in the OR on  with Dr. Stanton    She follows with Dr. Stanton for pelvic pain. She is s/p sclerotherapy and embolization of bilateral hypogastric veins.   She is using Nuvaring for contraception ***.     ***PFPT    She has tried:  - Vaginal Valium: moderate relief   - TPI (w/ lidocaine):   - Depo shot: tolerated well  - Progesterone IUD: tolerated well   - Robotic excision of endo w/ negative pathology   - Pain Med referral (Richelle): started on duloxetine  - GI referral: IBS-C treated w/ Linzess    GI symptoms: ***  Urinary symptoms: ***  Sexual activity: ***    Prior Therapies: ***    Imaging: ***  Labs: ***     GYN Hx  Gynecologic history:  Contraception/menstrual regulation: ***  Desires Childbearing: ***  Last pap: NILM neg HPV   *** family history of breast, ovarian, uterine, colon or endometrial cancer.     OBHx: ***  Pertinent PMH: depression, a. Flutter s/p ablation, asthma  Pertient PSH: Robotic EOE-neg path, lpsc myomectomy    PMHx, PSHx, SHx, Allergies, and Medications updated in Epic.  Past Medical History:   Diagnosis Date    Acute sinusitis, unspecified 2016    Acute rhinosinusitis    Encounter for initial prescription of contraceptive pills 08/10/2018    Encounter for initial prescription of contraceptive pills    Encounter for removal of intrauterine contraceptive device 2016    Encounter for IUD removal    Encounter for screening for malignant neoplasm of cervix 2015    Pap smear for cervical cancer screening    Myopia, bilateral 2014    Bilateral myopia    Other conditions influencing health status      0    Other specified health status 2017    Contraception    Other specified symptoms and  signs involving the digestive system and abdomen 07/09/2015    Difficulty swallowing pills    Pain in right shoulder 10/07/2020    Right shoulder pain    Pelvic and perineal pain 05/28/2021    Pelvic pain    Personal history of other diseases of the digestive system 07/30/2015    History of hemorrhoids    Personal history of other diseases of the female genital tract 08/10/2018    History of vaginal discharge    Personal history of other diseases of the female genital tract 08/10/2018    History of vulvodynia    Personal history of other diseases of the female genital tract 09/11/2015    History of metrorrhagia    Personal history of other diseases of the musculoskeletal system and connective tissue 10/13/2022    History of scoliosis    Personal history of other diseases of the respiratory system     History of asthma    Personal history of other specified conditions     H/O shortness of breath    Unspecified astigmatism, bilateral 12/01/2014    Astigmatism, bilateral     Past Surgical History:   Procedure Laterality Date    INVASIVE VASCULAR PROCEDURE N/A 4/15/2024    Procedure: VENOGRAM;  Surgeon: Marianna Ordaz MD;  Location: [facility];  Service: Vascular Surgery;  Laterality: N/A;  R jugular approach, pelvic venogram. 3% STS sclerotherapy    IR ANGIOGRAM INFERIOR EPIGASTRIC  2/9/2023    IR ANGIOGRAM INFERIOR EPIGASTRIC 2/9/2023 [facility]ICAL [facility]GAC[facility]    IR ANGIOGRAM INFERIOR EPIGASTRIC PELVIC  2/9/2023    IR ANGIOGRAM INFERIOR EPIGASTRIC PELVIC 2/9/2023 [facility] CLINICAL LEGAC[facility]    IR ANGIOGRAM INFERIOR EPIGASTRIC PELVIC  2/9/2023    IR ANGIOGRAM INFERIOR EPIGASTRIC PELVIC 2/9/2023 [facility] CLINICAL LEGACY    IR VENOGRAM LOWER EXTREMITY Left 12/11/2023    IR VENOGRAM LOWER EXTREMITY 12/11/2023 Marianna Ordaz MD [facility] ANGIO    OTHER SURGICAL HISTORY  08/18/2020    Removal    OTHER SURGICAL HISTORY  07/31/2015    Surgery Suture Of Gum Laceration     Allergies   Allergen Reactions    Benzonatate Unknown    Coconut Itching     Erythromycin-Benzoyl Peroxide Unknown    Fruit Flavor Unknown    Peach Itching    Pineapple Itching    Pollen Extracts Unknown    Sulfamethoxazole-Trimethoprim Swelling       Current Outpatient Medications:     acetaminophen (Tylenol) 325 mg tablet, Take 2 tablets (650 mg) by mouth every 6 hours if needed for mild pain (1 - 3)., Disp: 30 tablet, Rfl: 0    albuterol 2.5 mg /3 mL (0.083 %) nebulizer solution, Inhale 3 mL (2.5 mg) every 4 hours if needed for wheezing., Disp: , Rfl:     albuterol 90 mcg/actuation inhaler, Inhale 1-2 puffs every 4 hours if needed for shortness of breath., Disp: 18 g, Rfl: 2    cyclobenzaprine (Flexeril) 10 mg tablet, TAKE ONE TABLET BY MOUTH AS NEEDED AT BEDTIME FOR MUSLE SPASMS, Disp: 30 tablet, Rfl: 0    diazePAM (Valium) 5 mg/mL solution, Take 1 mL (5 mg) by mouth as needed at bedtime for muscle spasms for up to 3 days., Disp: 3 mL, Rfl: 0    estradiol (Estrace) 0.01 % (0.1 mg/gram) vaginal cream, PLACE A PEA SIZED AMOUNT ON YOUR FINGER AND APPLY TO SORE AREA OF VULVA. USE NIGHTLY FOR ONE MONTH., Disp: , Rfl:     etonogestreL-ethinyl estradioL (Nuvaring) 0.12-0.015 mg/24 hr vaginal ring, INSERT 1 EACH INTO THE VAGINA EVERY 28 (TWENTY-EIGHT) DAYS., Disp: 90 each, Rfl: 3    HYDROcodone-acetaminophen (Norco) 5-325 mg tablet, Take 1 tablet by mouth every 6 hours if needed for severe pain (7 - 10)., Disp: 5 tablet, Rfl: 0    hydrocortisone 2.5 % cream, Apply twice daily to affected areas, Disp: , Rfl:     lactulose 10 gram/15 mL (15 mL) solution, Take 10 g by mouth 2 times a day as needed (constipation)., Disp: 1000 mL, Rfl: 3    mometasone (Nasonex) 50 mcg/actuation nasal spray, ADMINISTER 2 SPRAYS INTO EACH NOSTRIL 2 TIMES A DAY., Disp: 17 g, Rfl: 2    naproxen (Naprosyn) 500 mg tablet, TAKE ONE TABLET BY MOUTH TWO TIMES A DAY AS NEEDED FOR MILD PAIN, Disp: 60 tablet, Rfl: 0    oxyCODONE (Roxicodone) 5 mg immediate release tablet, Take 1 tablet (5 mg) by mouth every 6 hours if needed  for severe pain (7 - 10)., Disp: 3 tablet, Rfl: 0  Social History     Socioeconomic History    Marital status:      Spouse name: Not on file    Number of children: Not on file    Years of education: Not on file    Highest education level: Not on file   Occupational History    Not on file   Tobacco Use    Smoking status: Never    Smokeless tobacco: Never   Substance and Sexual Activity    Alcohol use: Defer    Drug use: Defer    Sexual activity: Not on file   Other Topics Concern    Not on file   Social History Narrative    Not on file     Social Determinants of Health     Financial Resource Strain: Not on file   Food Insecurity: Not on file   Transportation Needs: Not on file   Physical Activity: Not on file   Stress: Not on file   Social Connections: Not on file   Intimate Partner Violence: Not on file   Housing Stability: Not on file     Family History   Problem Relation Name Age of Onset    Uterine cancer Mother      Seizures Mother      Other (cerebrovascular accident) Father      Uterine cancer Sister      Colon cancer Mother's Brother       OB History    No obstetric history on file.            ROS: reviewed and negative    PE:LMP 05/09/2024 (Exact Date) Comment: hcg negative  Gen: NAD  Psych: AAOx3  Skin: no lesions  Pulm: nonlabored breathing, normal respiratory effort  Cards: normal peripheral perfusion  Lymph: no LAD in axilla or groin  GI: soft, non-tender, non-distended, no rebound/guarding, no masses  :  External Genitalia: vulva without lesions, normal hair distribution  Urethral meatus: normal size and without massesBladder: non-tender, no masses or tenderness  Vagina: normal discharge, no lesions or masses  Cervix: without discharge or lesions, no cervical masses, no CMT  Uterus: non tender and not enlarged, mobile  Adnexa: non tender and not enlarged  Anus/Perineum: normal appearance      A/P: Armida Adkins is a 34 y.o. with chronic pelvic pain, high tone pelvic floor    - s/p bilateral  sclerotherapy of hypogastric veins  - s/p TPI on 7/29/24  - continue nuvaring ***      She will be posted for ***    RTC ***    Sridevi Vuong MD  Division of Minimally Invasive Gynecologic Surgery  [facility]

## 2024-08-14 ENCOUNTER — APPOINTMENT (OUTPATIENT)
Dept: VASCULAR SURGERY | Facility: CLINIC | Age: 34
End: 2024-08-14
Payer: COMMERCIAL

## 2024-08-14 VITALS
HEART RATE: 96 BPM | DIASTOLIC BLOOD PRESSURE: 79 MMHG | WEIGHT: 151.9 LBS | HEIGHT: 64 IN | SYSTOLIC BLOOD PRESSURE: 116 MMHG | BODY MASS INDEX: 25.93 KG/M2

## 2024-08-14 DIAGNOSIS — N94.89 PELVIC CONGESTION SYNDROME: Primary | ICD-10-CM

## 2024-08-14 PROCEDURE — 3008F BODY MASS INDEX DOCD: CPT | Performed by: NURSE PRACTITIONER

## 2024-08-14 PROCEDURE — 99214 OFFICE O/P EST MOD 30 MIN: CPT | Performed by: NURSE PRACTITIONER

## 2024-08-14 PROCEDURE — 1036F TOBACCO NON-USER: CPT | Performed by: NURSE PRACTITIONER

## 2024-08-14 ASSESSMENT — ENCOUNTER SYMPTOMS
OCCASIONAL FEELINGS OF UNSTEADINESS: 0
EYES NEGATIVE: 1
SHORTNESS OF BREATH: 0
CONSTITUTIONAL NEGATIVE: 1
NEUROLOGICAL NEGATIVE: 1
PALPITATIONS: 0
DEPRESSION: 0
MUSCULOSKELETAL NEGATIVE: 1
LOSS OF SENSATION IN FEET: 0
PSYCHIATRIC NEGATIVE: 1
RESPIRATORY NEGATIVE: 1
ALLERGIC/IMMUNOLOGIC NEGATIVE: 1
GASTROINTESTINAL NEGATIVE: 1
ENDOCRINE NEGATIVE: 1

## 2024-08-14 NOTE — PROGRESS NOTES
F/U REASON: pelvic pain    CURRENT ENCOUNTER:  Armida Adkins is 34 y.o. female here for follow up of pelvic congestion. She feels her symptoms are worse since we saw her last. She reports her symptoms are 20% better than before her pelvic congestion treatment.     Her last pelvic trigger injection last 1-2 weeks.     The depo shot causes her to have longer periods. She has since switched to the Neuvaring although this has not changed many of her symptoms.     Meds:     Current Outpatient Medications:     acetaminophen (Tylenol) 325 mg tablet, Take 2 tablets (650 mg) by mouth every 6 hours if needed for mild pain (1 - 3)., Disp: 30 tablet, Rfl: 0    albuterol 2.5 mg /3 mL (0.083 %) nebulizer solution, Inhale 3 mL (2.5 mg) every 4 hours if needed for wheezing., Disp: , Rfl:     albuterol 90 mcg/actuation inhaler, Inhale 1-2 puffs every 4 hours if needed for shortness of breath., Disp: 18 g, Rfl: 2    cyclobenzaprine (Flexeril) 10 mg tablet, TAKE ONE TABLET BY MOUTH AS NEEDED AT BEDTIME FOR MUSLE SPASMS, Disp: 30 tablet, Rfl: 0    diazePAM (Valium) 5 mg/mL solution, Take 1 mL (5 mg) by mouth as needed at bedtime for muscle spasms for up to 3 days., Disp: 3 mL, Rfl: 0    estradiol (Estrace) 0.01 % (0.1 mg/gram) vaginal cream, PLACE A PEA SIZED AMOUNT ON YOUR FINGER AND APPLY TO SORE AREA OF VULVA. USE NIGHTLY FOR ONE MONTH., Disp: , Rfl:     etonogestreL-ethinyl estradioL (Nuvaring) 0.12-0.015 mg/24 hr vaginal ring, INSERT 1 EACH INTO THE VAGINA EVERY 28 (TWENTY-EIGHT) DAYS., Disp: 90 each, Rfl: 3    HYDROcodone-acetaminophen (Norco) 5-325 mg tablet, Take 1 tablet by mouth every 6 hours if needed for severe pain (7 - 10)., Disp: 5 tablet, Rfl: 0    hydrocortisone 2.5 % cream, Apply twice daily to affected areas, Disp: , Rfl:     lactulose 10 gram/15 mL (15 mL) solution, Take 10 g by mouth 2 times a day as needed (constipation)., Disp: 1000 mL, Rfl: 3    mometasone (Nasonex) 50 mcg/actuation nasal spray, ADMINISTER 2  "SPRAYS INTO EACH NOSTRIL 2 TIMES A DAY., Disp: 17 g, Rfl: 2    naproxen (Naprosyn) 500 mg tablet, TAKE ONE TABLET BY MOUTH TWO TIMES A DAY AS NEEDED FOR MILD PAIN, Disp: 60 tablet, Rfl: 0    oxyCODONE (Roxicodone) 5 mg immediate release tablet, Take 1 tablet (5 mg) by mouth every 6 hours if needed for severe pain (7 - 10)., Disp: 3 tablet, Rfl: 0    Allergies:   Allergies   Allergen Reactions    Benzonatate Unknown    Coconut Itching    Erythromycin-Benzoyl Peroxide Unknown    Fruit Flavor Unknown    Peach Itching    Pineapple Itching    Pollen Extracts Unknown    Sulfamethoxazole-Trimethoprim Swelling     ROS:  Review of Systems   Constitutional: Negative.    HENT: Negative.     Eyes: Negative.    Respiratory: Negative.  Negative for shortness of breath.    Cardiovascular:  Negative for chest pain and palpitations.   Gastrointestinal: Negative.    Endocrine: Negative.    Genitourinary:  Positive for dyspareunia, menstrual problem, pelvic pain, vaginal discharge and vaginal pain.   Musculoskeletal: Negative.    Skin: Negative.    Allergic/Immunologic: Negative.    Neurological: Negative.    Psychiatric/Behavioral: Negative.       otherwise unremarkable    Objective:  Vitals:  There were no vitals filed for this visit.   /79 (BP Location: Left arm)   Pulse 96   Ht 1.626 m (5' 4\")   Wt 68.9 kg (151 lb 14.4 oz)   LMP 05/09/2024 (Exact Date) Comment: hcg negative  BMI 26.07 kg/m²     Physical Exam  Constitutional:       Appearance: Normal appearance.   HENT:      Head: Normocephalic and atraumatic.      Nose: Nose normal.      Mouth/Throat:      Mouth: Mucous membranes are moist.   Eyes:      Conjunctiva/sclera: Conjunctivae normal.   Cardiovascular:      Rate and Rhythm: Normal rate.      Pulses: Normal pulses.   Pulmonary:      Effort: Pulmonary effort is normal.   Musculoskeletal:         General: Normal range of motion.      Cervical back: Normal range of motion.   Skin:     General: Skin is warm and dry. "      Capillary Refill: Capillary refill takes less than 2 seconds.   Neurological:      General: No focal deficit present.      Mental Status: She is alert and oriented to person, place, and time.   Psychiatric:         Mood and Affect: Mood normal.         Behavior: Behavior normal.         Thought Content: Thought content normal.         Judgment: Judgment normal.     Labs:  Lab Results   Component Value Date    WBC 15.9 (H) 07/29/2024    WBC 16.2 (H) 07/25/2024    WBC 8.3 06/25/2024    HGB 12.5 07/29/2024    HGB 11.6 (L) 07/25/2024    HGB 12.6 06/25/2024    HCT 36.9 07/29/2024    HCT 34.3 (L) 07/25/2024    HCT 38.3 06/25/2024    MCV 82 07/29/2024    MCV 82 07/25/2024    MCV 82 06/25/2024     07/29/2024     Lab Results   Component Value Date    CREATININE 1.10 (H) 07/29/2024    CREATININE 0.86 07/25/2024    CREATININE 0.98 06/25/2024    BUN 13 07/29/2024    BUN 11 07/25/2024    BUN 6 06/25/2024     07/29/2024     07/25/2024     06/25/2024    K 3.1 (L) 07/29/2024    K 4.8 07/25/2024    K 3.7 06/25/2024     07/29/2024     07/25/2024     06/25/2024    CO2 25 07/29/2024    CO2 23 07/25/2024    CO2 28 06/25/2024       Imaging:      Assessment & Plan:  Armida Adkins is 34 y.o. female  with pelvic pain, intermittent on the left, with L flank pain.      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 Sanchez 2/9/2023. Pt states that she has been experience intermittent left flank pain and pelvic pain for approximately three years The pelvic pain leads to significant dyspareunia. Pt also experiences left abdominal pain during intercourse that is severe and bloating with abdominal pain during menses. No alleviating or aggravating pain. The pain is intermittent.  Describes it as sharp and severe.  She underwent a venogram which demonstrated a pressure differential between the left renal vein and IVC of 4 mmHg  After the procedure the  pt went into afib with RVR and underwent cardioversion, was on Eliquis until May 2023      + left flank pain at random times, no hematuria, + abdominal bloating     12/11/23: ULTRASOUND RIGHT JUGULAR VEIN ACCESS, LEFT RENAL VEIN, BILATERAL ILIOFEMORAL VENOGRAPHY, AND IVUS LEFT GONADAL AND PELVIC VENOGRAM      2/19/24 RIGHT JUGULAR ACCESS WITH FOAM SCLEROTHERAPY, EMBOLIZATION OF GONADAL VEIN with Dr. Marianna Ordaz     4/15/24: Venogram, R jugular approach, pelvic venogram. 3% STS sclerotherapy w/Orlin     Today's plan is as follows:  1) Continue to take vein formula, OTC MPFF  2) Follow-up in 6-9 months with us    Patient seen and plan of care discussed with Dr. Orlin London, DNP, APRN-CNP, AGPCNP-C, FNP-C, AGACNP-BC  Senior Nurse Practitioner, Vascular Surgery  [facility], [facility] [place]  [address], Office [phone]

## 2024-08-19 ENCOUNTER — APPOINTMENT (OUTPATIENT)
Dept: PHYSICAL THERAPY | Facility: CLINIC | Age: 34
End: 2024-08-19
Payer: COMMERCIAL

## 2024-08-19 ENCOUNTER — DOCUMENTATION (OUTPATIENT)
Dept: PHYSICAL THERAPY | Facility: CLINIC | Age: 34
End: 2024-08-19
Payer: COMMERCIAL

## 2024-08-19 NOTE — PROGRESS NOTES
Physical Therapy                 Therapy Communication Note    Patient Name: Armida Adikns  MRN: [id number]  Today's Date: 8/19/2024     Discipline: Physical Therapy    Missed Visit Reason:      Missed Time: No Show    Comment: no further visits scheduled.

## 2024-08-20 ENCOUNTER — OFFICE VISIT (OUTPATIENT)
Dept: ORTHOPEDIC SURGERY | Facility: CLINIC | Age: 34
End: 2024-08-20
Payer: COMMERCIAL

## 2024-08-20 VITALS — HEIGHT: 64 IN | BODY MASS INDEX: 25.78 KG/M2 | WEIGHT: 151 LBS

## 2024-08-20 DIAGNOSIS — M48.02 STENOSIS, CERVICAL SPINE: Primary | ICD-10-CM

## 2024-08-20 DIAGNOSIS — M50.20 HERNIATED DISC, CERVICAL: ICD-10-CM

## 2024-08-20 PROCEDURE — 3008F BODY MASS INDEX DOCD: CPT

## 2024-08-20 PROCEDURE — 99213 OFFICE O/P EST LOW 20 MIN: CPT

## 2024-08-20 ASSESSMENT — PAIN - FUNCTIONAL ASSESSMENT: PAIN_FUNCTIONAL_ASSESSMENT: NO/DENIES PAIN

## 2024-08-20 NOTE — PROGRESS NOTES
HPI:  Armida Adkins is a 34-year-old female who presents today for follow-up appointment and MRI review of cervical spine.  When I saw her last, on 7/23/2024, she was experiencing right-sided cervical radicular symptoms into the shoulder, arm, and to all 5 fingertips.  This was relatively constant at this time, but the patient has been doing physical therapy and a round of prednisone.  Currently, she is pain-free and would like to review her MRI with me today.    ROS:  Reviewed on EMR and patient intake sheet.    PMH/SH:  Reviewed on EMR and patient intake sheet.    Exam:  MSK: Full strength range of motion of lower extremities bilaterally.  Negative Misti's, negative Spurling's.  General: No acute distress. Awake and conversant.  Eyes: Normal conjunctiva, anicteric. Round symmetric pupils.  ENT: Hearing grossly intact. No nasal discharge.  Neck: Neck is supple. No masses or thyromegaly.  Respiratory: Respirations are non-labored. No wheezing.  Skin: Warm. No rashes or ulcers.  Psych: Alert and oriented. Cooperative, appropriate mood and affect, normal judgement.  CV: No lower extremity edema.  Neuro: Sensation and CN II-XII grossly normal.    Radiology:     MRI results from 8/6/2024 imported and demonstrate:    IMPRESSION:  Mild multilevel degenerative changes of the cervical spine. There is  mild spinal canal stenosis at C6-C7 secondary to disc bulge with  superimposed right paracentral/foraminal disc herniation resulting in  partial effacement of the ventral CSF space. There is moderate right  C6-C7 neural foraminal stenosis.      I personally reviewed the images/study and I agree with the findings  as stated by Resident Belkis Subramanian. This study was interpreted at  [facility], [place], [place].      MACRO:  None      Signed by: Jerry Danielson 8/6/2024 7:31 PM    Diagnosis:    Cervical canal stenosis  Foraminal disc herniation    Assessment and Plan:  Patient was seen today  evaluated for history of cervical radicular symptoms and for a cervical MRI review.  At this time, the patient is pain-free after completing physical therapy.  At this time, no further treatment is indicated unless symptoms return.  At that point, we will discuss alternative management such as pain management, surgical intervention.  She is free to follow-up at any time.  Patient feels all questions were answered today.  Patient agrees with plan above.    Jay Huerta PA-C  Department of Orthopaedic Surgery  11:34 AM  08/20/24    [address] [place] [address]    Voicemail: [phone]   Appts: [phone]  Fax: [phone]

## 2024-08-21 NOTE — PROGRESS NOTES
Division of Minimally Invasive Gynecologic Surgery  [facility]    24 Gynecology Visit    CC: No chief complaint on file.      Armida Adkins is a 34 y.o. who is a patient of Dr. Stanton    She had Trigger point injection in the OR on  with Dr. Stanton    She follows with Dr. Stanton for pelvic pain. She is s/p sclerotherapy and embolization of bilateral hypogastric veins.   She is using Nuvaring for contraception ***.     ***PFPT    She has tried:  - Vaginal Valium: moderate relief   - TPI (w/ lidocaine):   - Depo shot: tolerated well  - Progesterone IUD: tolerated well   - Robotic excision of endo w/ negative pathology   - Pain Med referral (Richelle): started on duloxetine  - GI referral: IBS-C treated w/ Linzess    GI symptoms: ***  Urinary symptoms: ***  Sexual activity: ***    Prior Therapies: ***    Imaging: ***  Labs: ***     GYN Hx  Gynecologic history:  Contraception/menstrual regulation: ***  Desires Childbearing: ***  Last pap: NILM neg HPV   *** family history of breast, ovarian, uterine, colon or endometrial cancer.     OBHx: ***  Pertinent PMH: depression, a. Flutter s/p ablation, asthma  Pertient PSH: Robotic EOE-neg path, lpsc myomectomy    PMHx, PSHx, SHx, Allergies, and Medications updated in Epic.  Past Medical History:   Diagnosis Date    Acute sinusitis, unspecified 2016    Acute rhinosinusitis    Encounter for initial prescription of contraceptive pills 08/10/2018    Encounter for initial prescription of contraceptive pills    Encounter for removal of intrauterine contraceptive device 2016    Encounter for IUD removal    Encounter for screening for malignant neoplasm of cervix 2015    Pap smear for cervical cancer screening    Myopia, bilateral 2014    Bilateral myopia    Other conditions influencing health status      0    Other specified health status 2017    Contraception    Other specified symptoms and  signs involving the digestive system and abdomen 07/09/2015    Difficulty swallowing pills    Pain in right shoulder 10/07/2020    Right shoulder pain    Pelvic and perineal pain 05/28/2021    Pelvic pain    Personal history of other diseases of the digestive system 07/30/2015    History of hemorrhoids    Personal history of other diseases of the female genital tract 08/10/2018    History of vaginal discharge    Personal history of other diseases of the female genital tract 08/10/2018    History of vulvodynia    Personal history of other diseases of the female genital tract 09/11/2015    History of metrorrhagia    Personal history of other diseases of the musculoskeletal system and connective tissue 10/13/2022    History of scoliosis    Personal history of other diseases of the respiratory system     History of asthma    Personal history of other specified conditions     H/O shortness of breath    Unspecified astigmatism, bilateral 12/01/2014    Astigmatism, bilateral     Past Surgical History:   Procedure Laterality Date    INVASIVE VASCULAR PROCEDURE N/A 4/15/2024    Procedure: VENOGRAM;  Surgeon: Marianna Ordaz MD;  Location: [facility];  Service: Vascular Surgery;  Laterality: N/A;  R jugular approach, pelvic venogram. 3% STS sclerotherapy    IR ANGIOGRAM INFERIOR EPIGASTRIC  2/9/2023    IR ANGIOGRAM INFERIOR EPIGASTRIC 2/9/2023 [facility] CLINICAL LEGAC[facility]    IR ANGIOGRAM INFERIOR EPIGASTRIC PELVIC  2/9/2023    IR ANGIOGRAM INFERIOR EPIGASTRIC PELVIC 2/9/2023 [facility] CLINICAL LEGAC[facility]    IR ANGIOGRAM INFERIOR EPIGASTRIC PELVIC  2/9/2023    IR ANGIOGRAM INFERIOR EPIGASTRIC PELVIC 2/9/2023 [facility] CLINICAL LEGAC[facility]    IR VENOGRAM LOWER EXTREMITY Left 12/11/2023    IR VENOGRAM LOWER EXTREMITY 12/11/2023 [place] Ordaz [place] [facility] [place]    OTHER SURGICAL HISTORY  08/18/2020    Removal    OTHER SURGICAL HISTORY  07/31/2015    Surgery Suture Of Gum Laceration     Allergies   Allergen Reactions    Benzonatate Unknown    Coconut Itching     Erythromycin-Benzoyl Peroxide Unknown    Fruit Flavor Unknown    Peach Itching    Pineapple Itching    Pollen Extracts Unknown    Sulfamethoxazole-Trimethoprim Swelling       Current Outpatient Medications:     acetaminophen (Tylenol) 325 mg tablet, Take 2 tablets (650 mg) by mouth every 6 hours if needed for mild pain (1 - 3)., Disp: 30 tablet, Rfl: 0    albuterol 2.5 mg /3 mL (0.083 %) nebulizer solution, Inhale 3 mL (2.5 mg) every 4 hours if needed for wheezing., Disp: , Rfl:     albuterol 90 mcg/actuation inhaler, Inhale 1-2 puffs every 4 hours if needed for shortness of breath., Disp: 18 g, Rfl: 2    cyclobenzaprine (Flexeril) 10 mg tablet, TAKE ONE TABLET BY MOUTH AS NEEDED AT BEDTIME FOR MUSLE SPASMS, Disp: 30 tablet, Rfl: 0    diazePAM (Valium) 5 mg/mL solution, Take 1 mL (5 mg) by mouth as needed at bedtime for muscle spasms for up to 3 days., Disp: 3 mL, Rfl: 0    estradiol (Estrace) 0.01 % (0.1 mg/gram) vaginal cream, PLACE A PEA SIZED AMOUNT ON YOUR FINGER AND APPLY TO SORE AREA OF VULVA. USE NIGHTLY FOR ONE MONTH., Disp: , Rfl:     etonogestreL-ethinyl estradioL (Nuvaring) 0.12-0.015 mg/24 hr vaginal ring, INSERT 1 EACH INTO THE VAGINA EVERY 28 (TWENTY-EIGHT) DAYS., Disp: 90 each, Rfl: 3    HYDROcodone-acetaminophen (Norco) 5-325 mg tablet, Take 1 tablet by mouth every 6 hours if needed for severe pain (7 - 10)., Disp: 5 tablet, Rfl: 0    hydrocortisone 2.5 % cream, Apply twice daily to affected areas, Disp: , Rfl:     lactulose 10 gram/15 mL (15 mL) solution, Take 10 g by mouth 2 times a day as needed (constipation)., Disp: 1000 mL, Rfl: 3    mometasone (Nasonex) 50 mcg/actuation nasal spray, ADMINISTER 2 SPRAYS INTO EACH NOSTRIL 2 TIMES A DAY., Disp: 17 g, Rfl: 2    naproxen (Naprosyn) 500 mg tablet, TAKE ONE TABLET BY MOUTH TWO TIMES A DAY AS NEEDED FOR MILD PAIN, Disp: 60 tablet, Rfl: 0    oxyCODONE (Roxicodone) 5 mg immediate release tablet, Take 1 tablet (5 mg) by mouth every 6 hours if needed  for severe pain (7 - 10). (Patient not taking: Reported on 8/14/2024), Disp: 3 tablet, Rfl: 0  Social History     Socioeconomic History    Marital status:      Spouse name: Not on file    Number of children: Not on file    Years of education: Not on file    Highest education level: Not on file   Occupational History    Not on file   Tobacco Use    Smoking status: Never    Smokeless tobacco: Never   Substance and Sexual Activity    Alcohol use: Defer    Drug use: Defer    Sexual activity: Not on file   Other Topics Concern    Not on file   Social History Narrative    Not on file     Social Determinants of Health     Financial Resource Strain: Not on file   Food Insecurity: Not on file   Transportation Needs: Not on file   Physical Activity: Not on file   Stress: Not on file   Social Connections: Not on file   Intimate Partner Violence: Not on file   Housing Stability: Not on file     Family History   Problem Relation Name Age of Onset    Uterine cancer Mother      Seizures Mother      Other (cerebrovascular accident) Father      Uterine cancer Sister      Colon cancer Mother's Brother       OB History    No obstetric history on file.            ROS: reviewed and negative    PE:LMP 05/09/2024 (Exact Date) Comment: hcg negative  Gen: NAD  Psych: AAOx3  Skin: no lesions  Pulm: nonlabored breathing, normal respiratory effort  Cards: normal peripheral perfusion  Lymph: no LAD in axilla or groin  GI: soft, non-tender, non-distended, no rebound/guarding, no masses  :  External Genitalia: vulva without lesions, normal hair distribution  Urethral meatus: normal size and without massesBladder: non-tender, no masses or tenderness  Vagina: normal discharge, no lesions or masses  Cervix: without discharge or lesions, no cervical masses, no CMT  Uterus: non tender and not enlarged, mobile  Adnexa: non tender and not enlarged  Anus/Perineum: normal appearance      A/P: Armida REGGIE HuttonLucille is a 34 y.o. with chronic pelvic  pain, high tone pelvic floor    - s/p bilateral sclerotherapy of hypogastric veins  - s/p TPI on 7/29/24  - continue nuvaring ***      She will be posted for ***    RTC ***    Sridevi Vuong MD  Division of Minimally Invasive Gynecologic Surgery  [facility]

## 2024-08-22 ENCOUNTER — APPOINTMENT (OUTPATIENT)
Dept: OBSTETRICS AND GYNECOLOGY | Facility: CLINIC | Age: 34
End: 2024-08-22
Payer: COMMERCIAL

## 2024-09-05 ENCOUNTER — APPOINTMENT (OUTPATIENT)
Dept: OBSTETRICS AND GYNECOLOGY | Facility: CLINIC | Age: 34
End: 2024-09-05
Payer: COMMERCIAL

## 2024-09-06 ENCOUNTER — APPOINTMENT (OUTPATIENT)
Dept: PRIMARY CARE | Facility: CLINIC | Age: 34
End: 2024-09-06
Payer: COMMERCIAL

## 2024-09-06 VITALS
BODY MASS INDEX: 25.1 KG/M2 | WEIGHT: 147 LBS | HEART RATE: 80 BPM | SYSTOLIC BLOOD PRESSURE: 112 MMHG | DIASTOLIC BLOOD PRESSURE: 75 MMHG | HEIGHT: 64 IN

## 2024-09-06 DIAGNOSIS — E87.6 HYPOKALEMIA: Primary | ICD-10-CM

## 2024-09-06 DIAGNOSIS — Z00.00 ROUTINE GENERAL MEDICAL EXAMINATION AT HEALTH CARE FACILITY: ICD-10-CM

## 2024-09-06 DIAGNOSIS — Z00.00 HEALTH CARE MAINTENANCE: ICD-10-CM

## 2024-09-06 DIAGNOSIS — J45.20 MILD INTERMITTENT ASTHMA WITHOUT COMPLICATION (HHS-HCC): ICD-10-CM

## 2024-09-06 DIAGNOSIS — E55.9 VITAMIN D DEFICIENCY: ICD-10-CM

## 2024-09-06 PROBLEM — I48.92 ATRIAL FLUTTER (MULTI): Status: RESOLVED | Noted: 2023-03-21 | Resolved: 2024-09-06

## 2024-09-06 PROBLEM — I47.19 AVNRT (AV NODAL RE-ENTRY TACHYCARDIA) (CMS-HCC): Status: RESOLVED | Noted: 2023-08-23 | Resolved: 2024-09-06

## 2024-09-06 PROBLEM — I47.10 SUPRAVENTRICULAR TACHYCARDIA (CMS-HCC): Status: RESOLVED | Noted: 2023-02-10 | Resolved: 2024-09-06

## 2024-09-06 ASSESSMENT — PATIENT HEALTH QUESTIONNAIRE - PHQ9
1. LITTLE INTEREST OR PLEASURE IN DOING THINGS: NOT AT ALL
1. LITTLE INTEREST OR PLEASURE IN DOING THINGS: SEVERAL DAYS
2. FEELING DOWN, DEPRESSED OR HOPELESS: SEVERAL DAYS
10. IF YOU CHECKED OFF ANY PROBLEMS, HOW DIFFICULT HAVE THESE PROBLEMS MADE IT FOR YOU TO DO YOUR WORK, TAKE CARE OF THINGS AT HOME, OR GET ALONG WITH OTHER PEOPLE: SOMEWHAT DIFFICULT
SUM OF ALL RESPONSES TO PHQ9 QUESTIONS 1 AND 2: 2
SUM OF ALL RESPONSES TO PHQ9 QUESTIONS 1 AND 2: 0
2. FEELING DOWN, DEPRESSED OR HOPELESS: NOT AT ALL

## 2024-09-06 ASSESSMENT — ENCOUNTER SYMPTOMS
BACK PAIN: 1
OCCASIONAL FEELINGS OF UNSTEADINESS: 0
NECK PAIN: 1
DEPRESSION: 1
FREQUENCY: 1
LOSS OF SENSATION IN FEET: 0

## 2024-09-06 ASSESSMENT — ACTIVITIES OF DAILY LIVING (ADL)
GROCERY_SHOPPING: INDEPENDENT
DOING_HOUSEWORK: INDEPENDENT
TAKING_MEDICATION: INDEPENDENT
DRESSING: INDEPENDENT
BATHING: INDEPENDENT
MANAGING_FINANCES: INDEPENDENT

## 2024-09-06 ASSESSMENT — LIFESTYLE VARIABLES
HOW MANY STANDARD DRINKS CONTAINING ALCOHOL DO YOU HAVE ON A TYPICAL DAY: PATIENT DOES NOT DRINK
SKIP TO QUESTIONS 9-10: 1
HOW OFTEN DO YOU HAVE A DRINK CONTAINING ALCOHOL: NEVER
AUDIT-C TOTAL SCORE: 0
HOW OFTEN DO YOU HAVE SIX OR MORE DRINKS ON ONE OCCASION: NEVER

## 2024-09-06 NOTE — PROGRESS NOTES
I reviewed the resident/fellow's documentation and discussed the patient with the resident/fellow. I agree with the resident/fellow's medical decision making as documented in the note.  As the attending physician, I certify that I personally reviewed the patient's history and personally examined the patient to confirm the physical findings described above, and that I reviewed the relevant imaging studies and available reports. I also discussed the differential diagnosis and all of the proposed management plans with the patient and individuals accompanying the patient to this visit. They had the opportunity to ask questions about the proposed management plans and to have those questions answered.     Lorin Chung MD

## 2024-09-06 NOTE — ASSESSMENT & PLAN NOTE
Orders:    Vitamin D 25-Hydroxy,Total (for eval of Vitamin D levels); Future  -Takes over-the-counter calcium and vitamin D supplementation

## 2024-09-06 NOTE — ASSESSMENT & PLAN NOTE
Orders:    Magnesium; Future    Magnesium, RBC; Future  - Currently not on any KCl supplementation    # Chronic pelvic pain   - follows with OB/GYN    # Chronic back pain  -Follows with Ortho pain management  -On Flexeril 10 mg as needed    # Depression  -PHQ-9 score 2  -Not suicidal  -On Cymbalta 30 mg for 14 days prescribed by Mark Davis  -Will follow-up with psychiatrist this month

## 2024-10-14 ENCOUNTER — ANESTHESIA EVENT (OUTPATIENT)
Dept: GASTROENTEROLOGY | Facility: HOSPITAL | Age: 34
End: 2024-10-14
Payer: COMMERCIAL

## 2024-10-15 ENCOUNTER — HOSPITAL ENCOUNTER (OUTPATIENT)
Dept: GASTROENTEROLOGY | Facility: HOSPITAL | Age: 34
Discharge: HOME | End: 2024-10-15
Payer: COMMERCIAL

## 2024-10-15 ENCOUNTER — ANESTHESIA (OUTPATIENT)
Dept: GASTROENTEROLOGY | Facility: HOSPITAL | Age: 34
End: 2024-10-15
Payer: COMMERCIAL

## 2024-10-15 VITALS
DIASTOLIC BLOOD PRESSURE: 73 MMHG | HEIGHT: 64 IN | OXYGEN SATURATION: 97 % | HEART RATE: 79 BPM | WEIGHT: 153.22 LBS | SYSTOLIC BLOOD PRESSURE: 109 MMHG | RESPIRATION RATE: 16 BRPM | BODY MASS INDEX: 26.16 KG/M2 | TEMPERATURE: 97.3 F

## 2024-10-15 DIAGNOSIS — R93.5 ABNORMAL CT OF THE ABDOMEN: ICD-10-CM

## 2024-10-15 DIAGNOSIS — K92.1 HEMATOCHEZIA: Primary | ICD-10-CM

## 2024-10-15 LAB — PREGNANCY TEST URINE, POC: NEGATIVE

## 2024-10-15 PROCEDURE — 2500000004 HC RX 250 GENERAL PHARMACY W/ HCPCS (ALT 636 FOR OP/ED)

## 2024-10-15 PROCEDURE — G0121 COLON CA SCRN NOT HI RSK IND: HCPCS | Performed by: STUDENT IN AN ORGANIZED HEALTH CARE EDUCATION/TRAINING PROGRAM

## 2024-10-15 PROCEDURE — 7100000010 HC PHASE TWO TIME - EACH INCREMENTAL 1 MINUTE

## 2024-10-15 PROCEDURE — 7100000009 HC PHASE TWO TIME - INITIAL BASE CHARGE

## 2024-10-15 PROCEDURE — 81025 URINE PREGNANCY TEST: CPT | Performed by: INTERNAL MEDICINE

## 2024-10-15 PROCEDURE — 45378 DIAGNOSTIC COLONOSCOPY: CPT | Performed by: STUDENT IN AN ORGANIZED HEALTH CARE EDUCATION/TRAINING PROGRAM

## 2024-10-15 PROCEDURE — 3700000002 HC GENERAL ANESTHESIA TIME - EACH INCREMENTAL 1 MINUTE

## 2024-10-15 PROCEDURE — 3700000001 HC GENERAL ANESTHESIA TIME - INITIAL BASE CHARGE

## 2024-10-15 RX ORDER — PROPOFOL 10 MG/ML
INJECTION, EMULSION INTRAVENOUS CONTINUOUS PRN
Status: DISCONTINUED | OUTPATIENT
Start: 2024-10-15 | End: 2024-10-15

## 2024-10-15 RX ORDER — SODIUM CHLORIDE, SODIUM LACTATE, POTASSIUM CHLORIDE, CALCIUM CHLORIDE 600; 310; 30; 20 MG/100ML; MG/100ML; MG/100ML; MG/100ML
100 INJECTION, SOLUTION INTRAVENOUS CONTINUOUS
Status: ACTIVE | OUTPATIENT
Start: 2024-10-15 | End: 2024-10-15

## 2024-10-15 RX ORDER — MIDAZOLAM HYDROCHLORIDE 1 MG/ML
INJECTION INTRAMUSCULAR; INTRAVENOUS AS NEEDED
Status: DISCONTINUED | OUTPATIENT
Start: 2024-10-15 | End: 2024-10-15

## 2024-10-15 RX ORDER — LIDOCAINE HYDROCHLORIDE 20 MG/ML
INJECTION, SOLUTION EPIDURAL; INFILTRATION; INTRACAUDAL; PERINEURAL AS NEEDED
Status: DISCONTINUED | OUTPATIENT
Start: 2024-10-15 | End: 2024-10-15

## 2024-10-15 RX ORDER — SODIUM CHLORIDE, SODIUM LACTATE, POTASSIUM CHLORIDE, CALCIUM CHLORIDE 600; 310; 30; 20 MG/100ML; MG/100ML; MG/100ML; MG/100ML
20 INJECTION, SOLUTION INTRAVENOUS CONTINUOUS
Status: DISCONTINUED | OUTPATIENT
Start: 2024-10-15 | End: 2024-10-16 | Stop reason: HOSPADM

## 2024-10-15 RX ORDER — ONDANSETRON HYDROCHLORIDE 2 MG/ML
4 INJECTION, SOLUTION INTRAVENOUS ONCE AS NEEDED
Status: DISCONTINUED | OUTPATIENT
Start: 2024-10-15 | End: 2024-10-16 | Stop reason: HOSPADM

## 2024-10-15 SDOH — HEALTH STABILITY: MENTAL HEALTH: CURRENT SMOKER: 0

## 2024-10-15 ASSESSMENT — PAIN SCALES - GENERAL
PAINLEVEL_OUTOF10: 0 - NO PAIN

## 2024-10-15 ASSESSMENT — PAIN - FUNCTIONAL ASSESSMENT
PAIN_FUNCTIONAL_ASSESSMENT: 0-10

## 2024-10-15 NOTE — ANESTHESIA POSTPROCEDURE EVALUATION
Patient: Armida Adkins    Procedure Summary       Date: 10/15/24 Room / Location: [facility]    Anesthesia Start: 1010 Anesthesia Stop: 1049    Procedure: COLONOSCOPY Diagnosis:       Abnormal CT of the abdomen      Hematochezia    Scheduled Providers: Cynthia Lizarraga MD; TRA Benavidez; Rene Murphy DO; Jamilah Emmanuel RN; Sheila James RN Responsible Provider: Rene Murphy DO    Anesthesia Type: MAC ASA Status: 2            Anesthesia Type: MAC    Vitals Value Taken Time   /73 10/15/24 1117   Temp 36.3 °C (97.3 °F) 10/15/24 1047   Pulse 79 10/15/24 1117   Resp 16 10/15/24 1117   SpO2 97 % 10/15/24 1117       Anesthesia Post Evaluation    Patient location during evaluation: PACU  Patient participation: complete - patient participated  Level of consciousness: awake and alert  Pain management: adequate  Airway patency: patent  Cardiovascular status: acceptable and blood pressure returned to baseline  Respiratory status: acceptable  Hydration status: acceptable  Postoperative Nausea and Vomiting: none        No notable events documented.

## 2024-10-15 NOTE — H&P
History Of Present Illness  Armida Adkins is a 34 y.o. female presenting with colonoscopy for abnormal CT scan and hematochezia. .     Past Medical History  Past Medical History:   Diagnosis Date    Acute sinusitis, unspecified 2016    Acute rhinosinusitis    Encounter for initial prescription of contraceptive pills 08/10/2018    Encounter for initial prescription of contraceptive pills    Encounter for removal of intrauterine contraceptive device 2016    Encounter for IUD removal    Encounter for screening for malignant neoplasm of cervix 2015    Pap smear for cervical cancer screening    Myopia, bilateral 2014    Bilateral myopia    Other conditions influencing health status      0    Other specified health status 2017    Contraception    Other specified symptoms and signs involving the digestive system and abdomen 2015    Difficulty swallowing pills    Pain in right shoulder 10/07/2020    Right shoulder pain    Pelvic and perineal pain 2021    Pelvic pain    Personal history of other diseases of the digestive system 2015    History of hemorrhoids    Personal history of other diseases of the female genital tract 08/10/2018    History of vaginal discharge    Personal history of other diseases of the female genital tract 08/10/2018    History of vulvodynia    Personal history of other diseases of the female genital tract 2015    History of metrorrhagia    Personal history of other diseases of the musculoskeletal system and connective tissue 10/13/2022    History of scoliosis    Personal history of other diseases of the respiratory system     History of asthma    Personal history of other specified conditions     H/O shortness of breath    Unspecified astigmatism, bilateral 2014    Astigmatism, bilateral     Surgical History  Past Surgical History:   Procedure Laterality Date    INVASIVE VASCULAR PROCEDURE N/A 4/15/2024    Procedure: VENOGRAM;   Surgeon: Marianna Ordaz MD;  Location: [facility];  Service: Vascular Surgery;  Laterality: N/A;  R jugular approach, pelvic venogram. 3% STS sclerotherapy    IR ANGIOGRAM INFERIOR EPIGASTRIC  2/9/2023    IR ANGIOGRAM INFERIOR EPIGASTRIC 2/9/2023 [facility] CLINICAL LEGAC[facility]    IR ANGIOGRAM INFERIOR EPIGASTRIC PELVIC  2/9/2023    IR ANGIOGRAM INFERIOR EPIGASTRIC PELVIC 2/9/2023 [facility] CLINICAL LEGAC[facility]    IR ANGIOGRAM INFERIOR EPIGASTRIC PELVIC  2/9/2023    IR ANGIOGRAM INFERIOR EPIGASTRIC PELVIC 2/9/2023 [facility] CLINICAL LEGAC[facility]    IR VENOGRAM LOWER EXTREMITY Left 12/11/2023    IR VENOGRAM LOWER EXTREMITY 12/11/2023 Marianna Ordaz MD [facility]GIO    OTHER SURGICAL HISTORY  08/18/2020    Removal    OTHER SURGICAL HISTORY  07/31/2015    Surgery Suture Of Gum Laceration     Social History  She reports that she has never smoked. She has been exposed to tobacco smoke. She has never used smokeless tobacco. She reports that she does not currently use alcohol. She reports that she does not use drugs.    Family History  Family History   Problem Relation Name Age of Onset    Uterine cancer Mother      Seizures Mother      Other (cerebrovascular accident) Father      Uterine cancer Sister      Colon cancer Mother's Brother          Allergies  Allergies   Allergen Reactions    Benzonatate Unknown    Coconut Itching    Erythromycin-Benzoyl Peroxide Unknown    Fruit Flavor Unknown    Peach Itching    Pineapple Itching    Pollen Extracts Unknown    Sulfamethoxazole-Trimethoprim Swelling     Review of Systems     Physical Exam  Constitutional:       General: She is awake.      Appearance: Normal appearance.   HENT:      Head: Normocephalic and atraumatic.      Nose: Nose normal.      Mouth/Throat:      Mouth: Mucous membranes are moist.   Eyes:      Pupils: Pupils are equal, round, and reactive to light.   Neck:      Thyroid: No thyroid mass.      Trachea: Phonation normal.   Cardiovascular:      Rate and Rhythm: Normal rate and regular rhythm.  "     Heart sounds: Normal heart sounds. No murmur heard.     No gallop.   Pulmonary:      Effort: Pulmonary effort is normal. No respiratory distress.      Breath sounds: Normal air entry. No decreased breath sounds, wheezing, rhonchi or rales.   Abdominal:      General: Bowel sounds are normal. There is no distension.      Palpations: Abdomen is soft.      Tenderness: There is no abdominal tenderness.   Musculoskeletal:      Cervical back: Neck supple.      Right lower leg: No edema.      Left lower leg: No edema.   Skin:     General: Skin is warm.      Capillary Refill: Capillary refill takes less than 2 seconds.   Neurological:      General: No focal deficit present.      Mental Status: She is alert and oriented to person, place, and time. Mental status is at baseline.      Cranial Nerves: Cranial nerves 2-12 are intact.      Motor: Motor function is intact.   Psychiatric:         Attention and Perception: Attention and perception normal.         Mood and Affect: Mood normal.         Speech: Speech normal.         Behavior: Behavior normal.          Last Recorded Vitals  Blood pressure 110/77, pulse 88, temperature 36.2 °C (97.2 °F), temperature source Temporal, resp. rate 14, height 1.626 m (5' 4\"), weight 69.5 kg (153 lb 3.5 oz), SpO2 96%.    Assessment/Plan   colonoscopy for abnormal CT scan and hematochezia. .     Cynthia Lizarraga MD  "

## 2024-10-15 NOTE — PERIOPERATIVE NURSING NOTE
1047: Phase 2 care begins  1105: Dr. Lizarraga bedside speaking to pt  1115: Discharge instructions reviewed with pt and family  1134: IV removed  1137: Pt transported to [facility]

## 2024-10-15 NOTE — ANESTHESIA PREPROCEDURE EVALUATION
Patient: Armida Adkins    Procedure Information       Date/Time: 10/15/24 1000    Scheduled providers: Cynthia Lizarraga MD; TRA Benavidez; Rene Murphy DO; Jamilah Emmanuel, FERMÍN; Sheila James, RN    Procedure: COLONOSCOPY    Location: [facility]            Relevant Problems   Anesthesia (within normal limits)      Cardiac  S/p AVNRT RFA      Pulmonary   (+) Asthma, intermittent (HHS-HCC)      Neuro   (+) Carpal tunnel syndrome, bilateral upper limbs   (+) Carpal tunnel syndrome, left   (+) Carpal tunnel syndrome, right   (+) Cervical radiculitis   (+) Depression      GI   (+) GERD (gastroesophageal reflux disease)   (+) Irritable bowel syndrome with constipation      Musculoskeletal   (+) Carpal tunnel syndrome, bilateral upper limbs   (+) Carpal tunnel syndrome, left   (+) Carpal tunnel syndrome, right   (+) Scoliosis      HEENT   (+) Acute sinusitis   (+) Bilateral sensorineural hearing loss   (+) Chronic sinusitis   (+) Seasonal allergies   (+) Sinus headache      GYN   (+) Abnormal uterine bleeding (AUB)   (+) Endometriosis   4/15/24  Patient had Foam Sclerotherapy and coil embolization of 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 3% STS sclerotherapy    IR ANGIOGRAM INFERIOR EPIGASTRIC  2/9/2023    IR ANGIOGRAM INFERIOR EPIGASTRIC 2/9/2023 [facility] CLINICAL LEGACY    IR ANGIOGRAM INFERIOR EPIGASTRIC PELVIC  2/9/2023    IR ANGIOGRAM INFERIOR EPIGASTRIC PELVIC 2/9/2023 [facility] CLINICAL LEGACY    IR ANGIOGRAM INFERIOR EPIGASTRIC PELVIC  2/9/2023    IR ANGIOGRAM INFERIOR EPIGASTRIC PELVIC  2023 [facility] CLINICAL LEGAC[facility]    IR VENOGRAM LOWER EXTREMITY Left 2023    IR VENOGRAM LOWER EXTREMITY 2023 Marianna Ordaz MD [facility] ANGIO    OTHER SURGICAL HISTORY  2020    Removal    OTHER SURGICAL HISTORY  2015    Surgery Suture Of Gum Laceration     Past Medical History:   Diagnosis Date    Acute sinusitis, unspecified 2016    Acute rhinosinusitis    Encounter for initial prescription of contraceptive pills 08/10/2018    Encounter for initial prescription of contraceptive pills    Encounter for removal of intrauterine contraceptive device 2016    Encounter for IUD removal    Encounter for screening for malignant neoplasm of cervix 2015    Pap smear for cervical cancer screening    Myopia, bilateral 2014    Bilateral myopia    Other conditions influencing health status      0    Other specified health status 2017    Contraception    Other specified symptoms and signs involving the digestive system and abdomen 2015    Difficulty swallowing pills    Pain in right shoulder 10/07/2020    Right shoulder pain    Pelvic and perineal pain 2021    Pelvic pain    Personal history of other diseases of the digestive system 2015    History of hemorrhoids    Personal history of other diseases of the female genital tract 08/10/2018    History of vaginal discharge    Personal history of other diseases of the female genital tract 08/10/2018    History of vulvodynia    Personal history of other diseases of the female genital tract 2015    History of metrorrhagia    Personal history of other diseases of the musculoskeletal system and connective tissue 10/13/2022    History of scoliosis    Personal history of other diseases of the respiratory system     History of asthma    Personal history of other specified conditions     H/O shortness of breath    Unspecified astigmatism, bilateral 2014    Astigmatism, bilateral       Current Outpatient  Medications:     acetaminophen (Tylenol) 325 mg tablet, Take 2 tablets (650 mg) by mouth every 6 hours if needed for mild pain (1 - 3)., Disp: 30 tablet, Rfl: 0    albuterol 90 mcg/actuation inhaler, Inhale 1-2 puffs every 4 hours if needed for shortness of breath., Disp: 18 g, Rfl: 2    cyclobenzaprine (Flexeril) 10 mg tablet, TAKE ONE TABLET BY MOUTH AS NEEDED AT BEDTIME FOR MUSLE SPASMS, Disp: 30 tablet, Rfl: 0    estradiol (Estrace) 0.01 % (0.1 mg/gram) vaginal cream, PLACE A PEA SIZED AMOUNT ON YOUR FINGER AND APPLY TO SORE AREA OF VULVA. USE NIGHTLY FOR ONE MONTH., Disp: , Rfl:     etonogestreL-ethinyl estradioL (Nuvaring) 0.12-0.015 mg/24 hr vaginal ring, INSERT 1 EACH INTO THE VAGINA EVERY 28 (TWENTY-EIGHT) DAYS., Disp: 90 each, Rfl: 3    HYDROcodone-acetaminophen (Norco) 5-325 mg tablet, Take 1 tablet by mouth every 6 hours if needed for severe pain (7 - 10)., Disp: 5 tablet, Rfl: 0    lactulose 10 gram/15 mL (15 mL) solution, Take 10 g by mouth 2 times a day as needed (constipation)., Disp: 1000 mL, Rfl: 3    mometasone (Nasonex) 50 mcg/actuation nasal spray, ADMINISTER 2 SPRAYS INTO EACH NOSTRIL 2 TIMES A DAY., Disp: 17 g, Rfl: 2    naproxen (Naprosyn) 500 mg tablet, TAKE ONE TABLET BY MOUTH TWO TIMES A DAY AS NEEDED FOR MILD PAIN, Disp: 60 tablet, Rfl: 0    oxyCODONE (Roxicodone) 5 mg immediate release tablet, Take 1 tablet (5 mg) by mouth every 6 hours if needed for severe pain (7 - 10)., Disp: 3 tablet, Rfl: 0    albuterol 2.5 mg /3 mL (0.083 %) nebulizer solution, Inhale 3 mL (2.5 mg) every 4 hours if needed for wheezing., Disp: , Rfl:     diazePAM (Valium) 5 mg/mL solution, Take 1 mL (5 mg) by mouth as needed at bedtime for muscle spasms for up to 3 days., Disp: 3 mL, Rfl: 0    hydrocortisone 2.5 % cream, Apply twice daily to affected areas, Disp: , Rfl:     Current Facility-Administered Medications:     lactated Ringer's infusion, 20 mL/hr, intravenous, Continuous, Elian Darby MD  Prior to  Admission medications    Medication Sig Start Date End Date Taking? Authorizing Provider   acetaminophen (Tylenol) 325 mg tablet Take 2 tablets (650 mg) by mouth every 6 hours if needed for mild pain (1 - 3). 7/30/24  Yes Chelsea Fontaine MD   albuterol 90 mcg/actuation inhaler Inhale 1-2 puffs every 4 hours if needed for shortness of breath. 5/26/23  Yes Lorin Chung MD   cyclobenzaprine (Flexeril) 10 mg tablet TAKE ONE TABLET BY MOUTH AS NEEDED AT BEDTIME FOR MUSLE SPASMS 4/29/24  Yes Lorin Chung MD   estradiol (Estrace) 0.01 % (0.1 mg/gram) vaginal cream PLACE A PEA SIZED AMOUNT ON YOUR FINGER AND APPLY TO SORE AREA OF VULVA. USE NIGHTLY FOR ONE MONTH. 12/11/23  Yes Historical Provider, MD   etonogestreL-ethinyl estradioL (Nuvaring) 0.12-0.015 mg/24 hr vaginal ring INSERT 1 EACH INTO THE VAGINA EVERY 28 (TWENTY-EIGHT) DAYS. 7/5/24  Yes Kelsea Stanton MD   HYDROcodone-acetaminophen (Norco) 5-325 mg tablet Take 1 tablet by mouth every 6 hours if needed for severe pain (7 - 10). 7/29/24  Yes Karolyn Joseph PA-C   lactulose 10 gram/15 mL (15 mL) solution Take 10 g by mouth 2 times a day as needed (constipation). 2/7/24 2/6/25 Yes ALISON Hernandez-CNP   mometasone (Nasonex) 50 mcg/actuation nasal spray ADMINISTER 2 SPRAYS INTO EACH NOSTRIL 2 TIMES A DAY. 12/12/23 12/11/24 Yes Bakari Mae MD   naproxen (Naprosyn) 500 mg tablet TAKE ONE TABLET BY MOUTH TWO TIMES A DAY AS NEEDED FOR MILD PAIN 3/26/24  Yes Lorin Chung MD   oxyCODONE (Roxicodone) 5 mg immediate release tablet Take 1 tablet (5 mg) by mouth every 6 hours if needed for severe pain (7 - 10). 7/30/24  Yes Chelsea Fontaine MD   albuterol 2.5 mg /3 mL (0.083 %) nebulizer solution Inhale 3 mL (2.5 mg) every 4 hours if needed for wheezing. 7/26/16   Historical Provider, MD   diazePAM (Valium) 5 mg/mL solution Take 1 mL (5 mg) by mouth as needed at bedtime for muscle spasms for up to 3 days. 5/8/24 5/11/24  Ade Frost,  FRANCISCO JAVIER   hydrocortisone 2.5 % cream Apply twice daily to affected areas 8/18/22   Historical Provider, MD     Allergies   Allergen Reactions    Benzonatate Unknown    Coconut Itching    Erythromycin-Benzoyl Peroxide Unknown    Fruit Flavor Unknown    Peach Itching    Pineapple Itching    Pollen Extracts Unknown    Sulfamethoxazole-Trimethoprim Swelling     Social History     Tobacco Use    Smoking status: Never     Passive exposure: Past    Smokeless tobacco: Never   Substance Use Topics    Alcohol use: Not Currently         Chemistry    Lab Results   Component Value Date/Time     07/29/2024 0427    K 3.1 (L) 07/29/2024 0427     07/29/2024 0427    CO2 25 07/29/2024 0427    BUN 13 07/29/2024 0427    CREATININE 1.10 (H) 07/29/2024 0427    Lab Results   Component Value Date/Time    CALCIUM 8.4 (L) 07/29/2024 0427    ALKPHOS 106 07/29/2024 0427    AST 11 07/29/2024 0427    ALT 17 07/29/2024 0427    BILITOT 0.3 07/29/2024 0427          Lab Results   Component Value Date/Time    WBC 15.9 (H) 07/29/2024 0427    HGB 12.5 07/29/2024 0427    HCT 36.9 07/29/2024 0427     07/29/2024 0427     Lab Results   Component Value Date/Time    PROTIME 13.9 (H) 04/07/2023 1510    INR 1.2 (H) 04/07/2023 1510       Clinical information reviewed:   Tobacco  Allergies  Meds   Med Hx  Surg Hx  OB Status  Fam Hx  Soc   Hx        NPO Detail:  NPO/Void Status  Carbohydrate Drink Given Prior to Surgery? : N  Date of Last Liquid: 10/15/24  Time of Last Liquid: 0500  Date of Last Solid: 10/14/24  Time of Last Solid: 0930  Last Intake Type: Clear fluids  Time of Last Void: 0939    HCG negative     Physical Exam    Airway  Mallampati: II  TM distance: >3 FB  Neck ROM: full     Cardiovascular   Rhythm: regular  Rate: normal     Dental   Comments: Upper and lower permanent retainer   Pulmonary   Breath sounds clear to auscultation     Abdominal            Anesthesia Plan    History of general anesthesia?: yes  History of  complications of general anesthesia?: no    ASA 2     MAC     The patient is not a current smoker.    Anesthetic plan and risks discussed with patient.  Use of blood products discussed with patient who consented to blood products.    Plan discussed with CAA.

## 2024-10-16 ENCOUNTER — HOSPITAL ENCOUNTER (EMERGENCY)
Facility: HOSPITAL | Age: 34
Discharge: HOME | End: 2024-10-16
Payer: COMMERCIAL

## 2024-10-16 VITALS
DIASTOLIC BLOOD PRESSURE: 89 MMHG | TEMPERATURE: 98.2 F | OXYGEN SATURATION: 98 % | WEIGHT: 149 LBS | SYSTOLIC BLOOD PRESSURE: 126 MMHG | HEART RATE: 94 BPM | HEIGHT: 64 IN | BODY MASS INDEX: 25.44 KG/M2 | RESPIRATION RATE: 16 BRPM

## 2024-10-16 DIAGNOSIS — R10.9 FLANK PAIN: Primary | ICD-10-CM

## 2024-10-16 LAB
ALBUMIN SERPL BCP-MCNC: 4 G/DL (ref 3.4–5)
ALP SERPL-CCNC: 110 U/L (ref 33–110)
ALT SERPL W P-5'-P-CCNC: 9 U/L (ref 7–45)
ANION GAP SERPL CALC-SCNC: 11 MMOL/L (ref 10–20)
APPEARANCE UR: CLEAR
AST SERPL W P-5'-P-CCNC: 12 U/L (ref 9–39)
BASOPHILS # BLD AUTO: 0.04 X10*3/UL (ref 0–0.1)
BASOPHILS NFR BLD AUTO: 0.4 %
BILIRUB SERPL-MCNC: 0.3 MG/DL (ref 0–1.2)
BILIRUB UR STRIP.AUTO-MCNC: NEGATIVE MG/DL
BUN SERPL-MCNC: 7 MG/DL (ref 6–23)
CALCIUM SERPL-MCNC: 9.3 MG/DL (ref 8.6–10.3)
CHLORIDE SERPL-SCNC: 104 MMOL/L (ref 98–107)
CO2 SERPL-SCNC: 27 MMOL/L (ref 21–32)
COLOR UR: NORMAL
CREAT SERPL-MCNC: 0.89 MG/DL (ref 0.5–1.05)
EGFRCR SERPLBLD CKD-EPI 2021: 88 ML/MIN/1.73M*2
EOSINOPHIL # BLD AUTO: 0.24 X10*3/UL (ref 0–0.7)
EOSINOPHIL NFR BLD AUTO: 2.6 %
ERYTHROCYTE [DISTWIDTH] IN BLOOD BY AUTOMATED COUNT: 12.5 % (ref 11.5–14.5)
GLUCOSE SERPL-MCNC: 88 MG/DL (ref 74–99)
GLUCOSE UR STRIP.AUTO-MCNC: NORMAL MG/DL
HCG UR QL IA.RAPID: NEGATIVE
HCT VFR BLD AUTO: 36.5 % (ref 36–46)
HGB BLD-MCNC: 12 G/DL (ref 12–16)
IMM GRANULOCYTES # BLD AUTO: 0.05 X10*3/UL (ref 0–0.7)
IMM GRANULOCYTES NFR BLD AUTO: 0.5 % (ref 0–0.9)
KETONES UR STRIP.AUTO-MCNC: NEGATIVE MG/DL
LEUKOCYTE ESTERASE UR QL STRIP.AUTO: NEGATIVE
LIPASE SERPL-CCNC: 37 U/L (ref 9–82)
LYMPHOCYTES # BLD AUTO: 2.93 X10*3/UL (ref 1.2–4.8)
LYMPHOCYTES NFR BLD AUTO: 31.8 %
MCH RBC QN AUTO: 27 PG (ref 26–34)
MCHC RBC AUTO-ENTMCNC: 32.9 G/DL (ref 32–36)
MCV RBC AUTO: 82 FL (ref 80–100)
MONOCYTES # BLD AUTO: 0.66 X10*3/UL (ref 0.1–1)
MONOCYTES NFR BLD AUTO: 7.2 %
NEUTROPHILS # BLD AUTO: 5.3 X10*3/UL (ref 1.2–7.7)
NEUTROPHILS NFR BLD AUTO: 57.5 %
NITRITE UR QL STRIP.AUTO: NEGATIVE
NRBC BLD-RTO: 0 /100 WBCS (ref 0–0)
PH UR STRIP.AUTO: 6 [PH]
PLATELET # BLD AUTO: 312 X10*3/UL (ref 150–450)
POTASSIUM SERPL-SCNC: 3.6 MMOL/L (ref 3.5–5.3)
PROT SERPL-MCNC: 7.2 G/DL (ref 6.4–8.2)
PROT UR STRIP.AUTO-MCNC: NEGATIVE MG/DL
RBC # BLD AUTO: 4.44 X10*6/UL (ref 4–5.2)
RBC # UR STRIP.AUTO: NEGATIVE /UL
SODIUM SERPL-SCNC: 138 MMOL/L (ref 136–145)
SP GR UR STRIP.AUTO: 1.01
UROBILINOGEN UR STRIP.AUTO-MCNC: NORMAL MG/DL
WBC # BLD AUTO: 9.2 X10*3/UL (ref 4.4–11.3)

## 2024-10-16 PROCEDURE — 99284 EMERGENCY DEPT VISIT MOD MDM: CPT | Mod: 25

## 2024-10-16 PROCEDURE — 83690 ASSAY OF LIPASE: CPT | Performed by: PHYSICIAN ASSISTANT

## 2024-10-16 PROCEDURE — 96374 THER/PROPH/DIAG INJ IV PUSH: CPT

## 2024-10-16 PROCEDURE — 85025 COMPLETE CBC W/AUTO DIFF WBC: CPT | Performed by: PHYSICIAN ASSISTANT

## 2024-10-16 PROCEDURE — 36415 COLL VENOUS BLD VENIPUNCTURE: CPT | Performed by: PHYSICIAN ASSISTANT

## 2024-10-16 PROCEDURE — 81003 URINALYSIS AUTO W/O SCOPE: CPT | Performed by: PHYSICIAN ASSISTANT

## 2024-10-16 PROCEDURE — 2500000004 HC RX 250 GENERAL PHARMACY W/ HCPCS (ALT 636 FOR OP/ED): Performed by: PHYSICIAN ASSISTANT

## 2024-10-16 PROCEDURE — 81025 URINE PREGNANCY TEST: CPT | Performed by: PHYSICIAN ASSISTANT

## 2024-10-16 PROCEDURE — 96375 TX/PRO/DX INJ NEW DRUG ADDON: CPT

## 2024-10-16 PROCEDURE — 80053 COMPREHEN METABOLIC PANEL: CPT | Performed by: PHYSICIAN ASSISTANT

## 2024-10-16 RX ORDER — KETOROLAC TROMETHAMINE 30 MG/ML
15 INJECTION, SOLUTION INTRAMUSCULAR; INTRAVENOUS ONCE
Status: COMPLETED | OUTPATIENT
Start: 2024-10-16 | End: 2024-10-16

## 2024-10-16 RX ORDER — NAPROXEN 500 MG/1
500 TABLET ORAL
Qty: 30 TABLET | Refills: 0 | Status: SHIPPED | OUTPATIENT
Start: 2024-10-16 | End: 2024-10-31

## 2024-10-16 RX ORDER — ONDANSETRON HYDROCHLORIDE 2 MG/ML
4 INJECTION, SOLUTION INTRAVENOUS ONCE
Status: COMPLETED | OUTPATIENT
Start: 2024-10-16 | End: 2024-10-16

## 2024-10-16 RX ORDER — ONDANSETRON 4 MG/1
4 TABLET, ORALLY DISINTEGRATING ORAL EVERY 8 HOURS PRN
Qty: 20 TABLET | Refills: 0 | Status: SHIPPED | OUTPATIENT
Start: 2024-10-16

## 2024-10-16 ASSESSMENT — PAIN SCALES - GENERAL
PAINLEVEL_OUTOF10: 0 - NO PAIN
PAINLEVEL_OUTOF10: 10 - WORST POSSIBLE PAIN

## 2024-10-16 ASSESSMENT — PAIN - FUNCTIONAL ASSESSMENT: PAIN_FUNCTIONAL_ASSESSMENT: 0-10

## 2024-10-17 NOTE — ED TRIAGE NOTES
TRIAGE NOTE   I saw the patient as the Clinician in Triage and performed a brief history and physical exam, established acuity, and ordered appropriate tests to develop basic plan of care. Patient will be seen by an ABHIJIT, resident and/or physician who will independently evaluate the patient. Please see subsequent provider notes for further details and disposition.     Brief HPI: In brief, Armida Adkins is a 34 y.o. female that presents for right flank pain began 4 hours ago.  No trauma or injury.  No urinary symptoms.  No GI symptoms.  No respiratory symptoms.  Pain is aggravated with movement.  Did not self medicate prior to arrival.  Denies pregnancy.  She is on birth control.  No prior history of kidney stone or gallbladder disease..     Focused Physical exam:   Afebrile stable vital signs.  No tachycardia or tachypnea.  No hypoxia oxygen requirement.  Right CVA/right flank with TTP.  No rash at site of the pain.  No abdominal pain or tenderness.    Plan/MDM:   Workup initiated: Musculoskeletal pain versus renal colic versus biliary colic.  Please see subsequent provider note for further details and disposition

## 2024-10-17 NOTE — ED PROVIDER NOTES
HPI   Chief Complaint   Patient presents with    Flank Pain       Is a pleasant 34-year-old female who had sudden pain in her right side she complains of pain under the right ribs and in the back she states her pain is moderate.  She had some vague nausea.  No vomiting.  Never had similar symptoms in the past besides when she has had spasms.              Patient History   Past Medical History:   Diagnosis Date    Acute sinusitis, unspecified 2016    Acute rhinosinusitis    Encounter for initial prescription of contraceptive pills 08/10/2018    Encounter for initial prescription of contraceptive pills    Encounter for removal of intrauterine contraceptive device 2016    Encounter for IUD removal    Encounter for screening for malignant neoplasm of cervix 2015    Pap smear for cervical cancer screening    Myopia, bilateral 2014    Bilateral myopia    Other conditions influencing health status      0    Other specified health status 2017    Contraception    Other specified symptoms and signs involving the digestive system and abdomen 2015    Difficulty swallowing pills    Pain in right shoulder 10/07/2020    Right shoulder pain    Pelvic and perineal pain 2021    Pelvic pain    Personal history of other diseases of the digestive system 2015    History of hemorrhoids    Personal history of other diseases of the female genital tract 08/10/2018    History of vaginal discharge    Personal history of other diseases of the female genital tract 08/10/2018    History of vulvodynia    Personal history of other diseases of the female genital tract 2015    History of metrorrhagia    Personal history of other diseases of the musculoskeletal system and connective tissue 10/13/2022    History of scoliosis    Personal history of other diseases of the respiratory system     History of asthma    Personal history of other specified conditions     H/O shortness of breath     Unspecified astigmatism, bilateral 12/01/2014    Astigmatism, bilateral     Past Surgical History:   Procedure Laterality Date    INVASIVE VASCULAR PROCEDURE N/A 4/15/2024    Procedure: VENOGRAM;  Surgeon: Marianna Ordaz MD;  Location: [facility];  Service: Vascular Surgery;  Laterality: N/A;  R jugular approach, pelvic venogram. 3% STS sclerotherapy    IR ANGIOGRAM INFERIOR EPIGASTRIC  2/9/2023    IR ANGIOGRAM INFERIOR EPIGASTRIC 2/9/2023 [facility] CLINICAL LEGACY    IR ANGIOGRAM INFERIOR EPIGASTRIC PELVIC  2/9/2023    IR ANGIOGRAM INFERIOR EPIGASTRIC PELVIC 2/9/2023 [facility] CLINICAL LEGACY    IR ANGIOGRAM INFERIOR EPIGASTRIC PELVIC  2/9/2023    IR ANGIOGRAM INFERIOR EPIGASTRIC PELVIC 2/9/2023 [facility] CLINICAL LEGACY    IR VENOGRAM LOWER EXTREMITY Left 12/11/2023    IR VENOGRAM LOWER EXTREMITY 12/11/2023 Marianna Ordaz MD [facility] ANGIO    OTHER SURGICAL HISTORY  08/18/2020    Removal    OTHER SURGICAL HISTORY  07/31/2015    Surgery Suture Of Gum Laceration     Family History   Problem Relation Name Age of Onset    Uterine cancer Mother      Seizures Mother      Other (cerebrovascular accident) Father      Uterine cancer Sister      Colon cancer Mother's Brother       Social History     Tobacco Use    Smoking status: Never     Passive exposure: Past    Smokeless tobacco: Never   Substance Use Topics    Alcohol use: Not Currently    Drug use: Never       Physical Exam   ED Triage Vitals [10/16/24 2057]   Temperature Heart Rate Respirations BP   36.8 °C (98.2 °F) 94 16 126/89      Pulse Ox Temp Source Heart Rate Source Patient Position   98 % Oral -- --      BP Location FiO2 (%)     -- --       Physical Exam  Vitals and nursing note reviewed.   Constitutional:       General: She is not in acute distress.     Appearance: Normal appearance. She is normal weight. She is not ill-appearing, toxic-appearing or diaphoretic.   HENT:      Head: Normocephalic and atraumatic.      Right Ear: External ear normal.      Left Ear: External ear  normal.      Nose: Nose normal.      Mouth/Throat:      Mouth: Mucous membranes are moist.      Pharynx: No oropharyngeal exudate.   Eyes:      Extraocular Movements: Extraocular movements intact.      Conjunctiva/sclera: Conjunctivae normal.      Pupils: Pupils are equal, round, and reactive to light.   Cardiovascular:      Rate and Rhythm: Normal rate and regular rhythm.   Pulmonary:      Effort: Pulmonary effort is normal. No respiratory distress.      Breath sounds: No stridor.   Abdominal:      General: There is no distension.      Tenderness: There is no right CVA tenderness, left CVA tenderness, guarding or rebound.      Comments: Discomfort in the right upper quadrant but no Khoury sign   Musculoskeletal:         General: No swelling, tenderness or deformity.      Cervical back: Normal range of motion.   Skin:     General: Skin is warm.      Capillary Refill: Capillary refill takes less than 2 seconds.      Coloration: Skin is not jaundiced or pale.      Findings: No bruising or rash.   Neurological:      General: No focal deficit present.      Mental Status: She is alert and oriented to person, place, and time. Mental status is at baseline.      Cranial Nerves: No cranial nerve deficit.      Sensory: No sensory deficit.      Motor: No weakness.   Psychiatric:         Mood and Affect: Mood normal.         Behavior: Behavior normal.         Thought Content: Thought content normal.         Judgment: Judgment normal.           ED Course & MDM   Diagnoses as of 10/17/24 0014   Flank pain                 No data recorded     [place] Coma Scale Score: 15 (10/16/24 2100 : Kyra Kathryn Behnfeldt, RN)                           Medical Decision Making  Differential diagnosis of biliary colic, muscle spasm, musculoskeletal cause, flank pain, renal colic.    Labs are essentially unremarkable discussed these findings with the patient and offered imaging however she is not interested in staying any longer will take pain  medications understands she can return return at any time in the possible pathologies to cause this pain.        Procedure  Procedures     Elio Zapata PA-C  10/17/24 0016

## 2024-10-17 NOTE — DISCHARGE INSTRUCTIONS
Please return for any worse or concerning symptoms for imaging otherwise please follow up with your doctor

## 2024-10-17 NOTE — ED TRIAGE NOTES
Pt arrives to triage with right flank pain beginning today. Stated it first began when she bent down and the pain since has caused difficulty breathing, radiates to lower back. Experiencing nauseousness. Denies CP, diarrhea, constipation, dysuria

## 2024-10-22 ENCOUNTER — HOSPITAL ENCOUNTER (EMERGENCY)
Facility: HOSPITAL | Age: 34
Discharge: HOME | End: 2024-10-22
Payer: COMMERCIAL

## 2024-10-22 VITALS
HEIGHT: 64 IN | TEMPERATURE: 98.2 F | BODY MASS INDEX: 25.27 KG/M2 | RESPIRATION RATE: 20 BRPM | DIASTOLIC BLOOD PRESSURE: 75 MMHG | WEIGHT: 148 LBS | SYSTOLIC BLOOD PRESSURE: 123 MMHG | HEART RATE: 90 BPM | OXYGEN SATURATION: 99 %

## 2024-10-22 DIAGNOSIS — J06.9 VIRAL URI WITH COUGH: Primary | ICD-10-CM

## 2024-10-22 LAB
FLUAV RNA RESP QL NAA+PROBE: NOT DETECTED
FLUBV RNA RESP QL NAA+PROBE: NOT DETECTED
S PYO DNA THROAT QL NAA+PROBE: NOT DETECTED
SARS-COV-2 RNA RESP QL NAA+PROBE: NOT DETECTED

## 2024-10-22 PROCEDURE — 99283 EMERGENCY DEPT VISIT LOW MDM: CPT

## 2024-10-22 PROCEDURE — 2500000001 HC RX 250 WO HCPCS SELF ADMINISTERED DRUGS (ALT 637 FOR MEDICARE OP): Performed by: PHYSICIAN ASSISTANT

## 2024-10-22 PROCEDURE — 87636 SARSCOV2 & INF A&B AMP PRB: CPT | Performed by: EMERGENCY MEDICINE

## 2024-10-22 PROCEDURE — 87651 STREP A DNA AMP PROBE: CPT | Performed by: EMERGENCY MEDICINE

## 2024-10-22 RX ORDER — ACETAMINOPHEN 325 MG/1
975 TABLET ORAL ONCE
Status: COMPLETED | OUTPATIENT
Start: 2024-10-22 | End: 2024-10-22

## 2024-10-22 RX ORDER — PSEUDOEPHEDRINE HCL 30 MG
60 TABLET ORAL ONCE
Status: COMPLETED | OUTPATIENT
Start: 2024-10-22 | End: 2024-10-22

## 2024-10-22 RX ORDER — IBUPROFEN 600 MG/1
600 TABLET ORAL EVERY 6 HOURS PRN
Qty: 20 TABLET | Refills: 0 | Status: SHIPPED | OUTPATIENT
Start: 2024-10-22

## 2024-10-22 RX ORDER — GUAIFENESIN AND PSEUDOEPHEDRINE HCL 1200; 120 MG/1; MG/1
1 TABLET, EXTENDED RELEASE ORAL 2 TIMES DAILY PRN
Qty: 20 TABLET | Refills: 0 | Status: SHIPPED | OUTPATIENT
Start: 2024-10-22

## 2024-10-22 ASSESSMENT — PAIN SCALES - GENERAL
PAINLEVEL_OUTOF10: 5 - MODERATE PAIN
PAINLEVEL_OUTOF10: 5 - MODERATE PAIN
PAINLEVEL_OUTOF10: 7

## 2024-10-22 ASSESSMENT — PAIN DESCRIPTION - LOCATION
LOCATION: HEAD
LOCATION: HEAD

## 2024-10-22 ASSESSMENT — PAIN - FUNCTIONAL ASSESSMENT: PAIN_FUNCTIONAL_ASSESSMENT: 0-10

## 2024-10-22 ASSESSMENT — PAIN DESCRIPTION - DESCRIPTORS: DESCRIPTORS: PRESSURE

## 2024-10-22 NOTE — Clinical Note
Armida Adkins was seen and treated in our emergency department on 10/22/2024.  She may return to work on 10/24/2024.       If you have any questions or concerns, please don't hesitate to call.      Karolyn Joseph PA-C

## 2024-10-22 NOTE — ED PROVIDER NOTES
No chief complaint on file.    HPI:   Armida Adkins is an 34 y.o. female with history of asthma, endometriosis, anemia, GERD, migraine disorder who presents to the ED with  for evaluation of viral symptoms x 4 days.  Patient endorses sore throat, nasal congestion in the left nare, runny nose, nonproductive cough, subjective fever, chills.  Patient says her cough causes her to gag and feel nauseous, also causes her to have a headache.  Rates her head pain /10.  Aggravated by coughing.  No relieving factors.  Patient says she took Robitussin a couple of days ago which did seem to help her symptoms but has not taken any medication today.  Endorses multiple sick contacts at work.  She denies chest pain, shortness of breath, palpitations, hemoptysis, leg swelling, dizziness or lightheadedness, syncope, numbness, tingling, muscle weakness, ear pain, neck pain or stiffness.  Medications:  Soc HX:  Allergies   Allergen Reactions    Benzonatate Unknown    Coconut Itching    Erythromycin-Benzoyl Peroxide Unknown    Fruit Flavor Unknown    Peach Itching    Pineapple Itching    Pollen Extracts Unknown    Sulfamethoxazole-Trimethoprim Swelling   :  Past Medical History:   Diagnosis Date    Acute sinusitis, unspecified 2016    Acute rhinosinusitis    Encounter for initial prescription of contraceptive pills 08/10/2018    Encounter for initial prescription of contraceptive pills    Encounter for removal of intrauterine contraceptive device 2016    Encounter for IUD removal    Encounter for screening for malignant neoplasm of cervix 2015    Pap smear for cervical cancer screening    Myopia, bilateral 2014    Bilateral myopia    Other conditions influencing health status      0    Other specified health status 2017    Contraception    Other specified symptoms and signs involving the digestive system and abdomen 2015    Difficulty swallowing pills    Pain in right shoulder  10/07/2020    Right shoulder pain    Pelvic and perineal pain 05/28/2021    Pelvic pain    Personal history of other diseases of the digestive system 07/30/2015    History of hemorrhoids    Personal history of other diseases of the female genital tract 08/10/2018    History of vaginal discharge    Personal history of other diseases of the female genital tract 08/10/2018    History of vulvodynia    Personal history of other diseases of the female genital tract 09/11/2015    History of metrorrhagia    Personal history of other diseases of the musculoskeletal system and connective tissue 10/13/2022    History of scoliosis    Personal history of other diseases of the respiratory system     History of asthma    Personal history of other specified conditions     H/O shortness of breath    Unspecified astigmatism, bilateral 12/01/2014    Astigmatism, bilateral     Past Surgical History:   Procedure Laterality Date    INVASIVE VASCULAR PROCEDURE N/A 4/15/2024    Procedure: VENOGRAM;  Surgeon: Marianna Ordaz MD;  Location: [facility];  Service: Vascular Surgery;  Laterality: N/A;  R jugular approach, pelvic venogram. 3% STS sclerotherapy    IR ANGIOGRAM INFERIOR EPIGASTRIC  2/9/2023    IR ANGIOGRAM INFERIOR EPIGASTRIC 2/9/2023 [facility]    IR ANGIOGRAM INFERIOR EPIGASTRIC PELVIC  2/9/2023    IR ANGIOGRAM INFERIOR EPIGASTRIC PELVIC 2/9/2023 [facility] CL[facility]GAC[facility]    IR ANGIOGRAM INFERIOR EPIGASTRIC PELVIC  2/9/2023    IR ANGIOGRAM INFERIOR EPIGASTRIC PELVIC 2/9/2023 [facility] CLINICAL [facility]GAC[facility]    IR VENOGRAM LOWER EXTREMITY Left 12/11/2023    IR VENOGRAM LOWER EXTREMITY 12/11/2023 Marianna Ordaz MD [facility] [place] ANGIO    OTHER SURGICAL HISTORY  08/18/2020    Removal    OTHER SURGICAL HISTORY  07/31/2015    Surgery Suture Of Gum Laceration     Family History   Problem Relation Name Age of Onset    Uterine cancer Mother      Seizures Mother      Other (cerebrovascular accident) Father      Uterine cancer Sister      Colon cancer  Mother's Brother        Physical Exam  Vitals and nursing note reviewed.   Constitutional:       General: She is not in acute distress.     Appearance: Normal appearance. She is not ill-appearing or toxic-appearing.   HENT:      Right Ear: Tympanic membrane, ear canal and external ear normal.      Left Ear: Tympanic membrane, ear canal and external ear normal.      Ears:      Comments: No mastoid tenderness nor swelling     Nose: Congestion and rhinorrhea present.      Comments: Maxillary and frontal sinus TTP     Mouth/Throat:      Mouth: Mucous membranes are moist.      Pharynx: Posterior oropharyngeal erythema present.      Comments: Uvula midline.  Tonsils symmetric enlarged 1+ without exudate  Eyes:      Pupils: Pupils are equal, round, and reactive to light.   Cardiovascular:      Rate and Rhythm: Normal rate and regular rhythm.      Pulses: Normal pulses.      Heart sounds: Normal heart sounds.   Pulmonary:      Effort: Pulmonary effort is normal. No respiratory distress.      Breath sounds: Normal breath sounds.   Abdominal:      General: Bowel sounds are normal.      Palpations: Abdomen is soft.      Tenderness: There is no abdominal tenderness. There is no guarding or rebound.   Musculoskeletal:         General: Normal range of motion.      Cervical back: Normal range of motion.   Lymphadenopathy:      Cervical: Cervical adenopathy present.   Skin:     General: Skin is warm and dry.      Capillary Refill: Capillary refill takes less than 2 seconds.   Neurological:      Mental Status: She is alert.      Cranial Nerves: No cranial nerve deficit.     VS: As documented in the triage note and EMR flowsheet from this visit were reviewed.    External Records Reviewed: I reviewed recent and relevant outside records including: Reviewed ED provider note 10/16/2024.  Patient seen for flank pain.  Had normal workup.  Did not want to stay for imaging.  Discharged home with prescription for Zofran and naproxen.       Medical Decision Making:   ED Course as of 10/22/24 2028   Tue Oct 22, 2024   1741 Vitals Reviewed: Afebrile. Normotensive. Tachycardic; tachypneic. No hypoxia.   [KA]   1751 Patient is well-appearing 34-year-old female who presents to the ED for evaluation of multiple viral type symptoms including cough, subjective fever, chills, sore throat and nasal congestion with rhinorrhea.  She is borderline tachycardic.  Lungs are clear.  Heart rate is regular.  Tonsils symmetrically enlarged 1+ without exudate.  Maxillary sinus tenderness to palpation.  Bilateral TMs are normal with no evidence of otitis media nor mastoiditis.  Does have bilateral tonsillar lymphadenopathy.  Pulses are symmetric.  Abdomen is soft nontender.  COVID, flu and strep were obtained during triage and are in the process.  Patient to be given Sudafed, Tylenol. [KA]   1858 I personally reviewed labs. COVID/flu negative. Still waiting on strep results. Have called lab and they state about 20 more mins before strep results.  [KA]   1900 Discussed results with patient.  She does feel little bit better following medication administration.  She says she would like to wait until results are back.  Patient be signed out to colleague pending results. [KA]   1932 Strep test is negative.  Presentation consident with viral syndrome. [SK]      ED Course User Index  [KA] Karolyn Joseph PA-C  [SK] Tunde Ferreira PA-C         Diagnoses as of 10/22/24 2028   Viral URI with cough      Escalation of Care: Appropriate for outpatient mgmt     Counseling: Spoke with the patient and discussed today´s findings, in addition to providing specific details for the plan of care and expected course.  Patient was given the opportunity to ask questions.    Discussed return precautions and importance of follow-up.  Advised to follow-up with PCP.  Advised to return to the ED for changing or worsening symptoms, new symptoms, complaint specific precautions, and precautions listed  on the discharge paperwork.  Educated on the common potential side effects of medications prescribed.    I advised the patient that the emergency evaluation and treatment provided today doesn't end their need for medical care. It is very important that they follow-up with their primary care provider or other specialist as instructed.    The plan of care was mutually agreed upon with the patient. The patient and/or family were given the opportunity to ask questions. All questions asked today in the ED were answered to the best of my ability with today's information.    I specifically advised the patient to return to the ED for changing or worsening symptoms, worrisome new symptoms, or for any complaint specific precautions listed on the discharge paperwork.    This patient was cared for in the setting of nationwide stress on resources and staffing.    This report was transcribed using voice recognition software.  Every effort was made to ensure accuracy, however, inadvertently computerized transcription errors may be present.       Tunde Ferreira PA-C  10/22/24 2028

## 2024-11-05 ENCOUNTER — APPOINTMENT (OUTPATIENT)
Dept: INTEGRATIVE MEDICINE | Facility: CLINIC | Age: 34
End: 2024-11-05
Payer: COMMERCIAL

## 2024-11-06 ENCOUNTER — TELEMEDICINE (OUTPATIENT)
Dept: PRIMARY CARE | Facility: CLINIC | Age: 34
End: 2024-11-06
Payer: COMMERCIAL

## 2024-11-06 DIAGNOSIS — J06.9 VIRAL URI WITH COUGH: ICD-10-CM

## 2024-11-06 PROCEDURE — 99213 OFFICE O/P EST LOW 20 MIN: CPT | Performed by: INTERNAL MEDICINE

## 2024-11-06 RX ORDER — OXYMETAZOLINE HYDROCHLORIDE 0.05 G/100ML
2 SPRAY, METERED NASAL EVERY 12 HOURS PRN
Qty: 30 ML | Refills: 0 | Status: SHIPPED | OUTPATIENT
Start: 2024-11-06 | End: 2024-11-08

## 2024-11-06 RX ORDER — GUAIFENESIN AND PSEUDOEPHEDRINE HCL 1200; 120 MG/1; MG/1
1 TABLET, EXTENDED RELEASE ORAL 2 TIMES DAILY PRN
Qty: 20 TABLET | Refills: 0 | Status: SHIPPED | OUTPATIENT
Start: 2024-11-06

## 2024-11-06 RX ORDER — ACETAMINOPHEN 325 MG/1
650 TABLET ORAL EVERY 6 HOURS PRN
Qty: 30 TABLET | Refills: 0 | Status: SHIPPED | OUTPATIENT
Start: 2024-11-06

## 2024-11-06 ASSESSMENT — ENCOUNTER SYMPTOMS
HEADACHES: 1
HOARSE VOICE: 1
COUGH: 1
SORE THROAT: 1

## 2024-11-06 NOTE — PROGRESS NOTES
I reviewed the resident/fellow's documentation and discussed the patient with the resident/fellow. I agree with the resident/fellow's medical decision making as documented in the note.  As the attending physician,  I reviewed the relevant imaging studies and available reports. I also discussed the differential diagnosis and all of the proposed management plans with the resident.    Lorin Chung MD

## 2024-11-06 NOTE — PROGRESS NOTES
[facility]  Primary care Physician office Note                       Name: Armida Adkins,         Age: 34 y.o.,          Gender: female,          MRN: [id number]   Pharmacy:     [facility] [address] [place] [address] AT [place] [address]  Phone: [phone] Fax: [phone]     PCP: Lorin Chung   Subjective     Chief Complaint   Patient presents with    URI     Sorethroat, nasal congestion         TELE VISIT  An interactive audio and video telecommunication system which permits real time communications between the patient (at the originating site) and provider (at the distant site) was utilized to provide this telehealth service.    Verbal consent was requested and obtained from the patient on the day of encounter.  This is a virtual visit using HIPAA compliant video platform. It required patient-provider interaction for the medical decision making as documented.     I have communicated my name and active licensure. The patient's identity and physical location were verified at the time of this visit.   Either the patient or their legal representative has been informed of the risks and benefits of -- and alternatives to -- treatment through a remote evaluation and consents to proceed with the evaluation remotely.      HPI:   Armida Adkins is a 34 y.o. female presenting a pmh of asthma, endometriosis, anemia, GERD, migraine disorder who called into the clinic for sudden onset sore throat, headaches and rhinorrhea that started after she went voting yesterday. She described waking up today with headaches, horse voice, and sore throat. There is associated hx of subjective fevers, and nasal congestion. Pt has a hx of deviated nasal septum.   She denies palpitations,diaphoresis, LOC, seizure like activities, CP or dyspnea, photophobia, changes In speech or vision. Or changes in bowel/urinary habits.     Answers submitted by the patient  for this visit:  Sore Throat Questionnaire (Submitted on 11/6/2024)  Chief Complaint: Sore throat  Chronicity: new  Onset: yesterday  Progression since onset: rapidly worsening  Pain worse on: neither  Fever: no fever  Fever duration: less than 1 day  pain severity now: moderate  Pain - numeric: 10/10  congestion: Yes  cough: Yes  headaches: Yes  hoarse voice: Yes    ROS:   The patient denies headaches, lightheadedness, changes in speech/vision, photophobia, dysphagia, diaphoresis, Chest pain, dyspnea, Abdominal pain, recent falls or trauma, and changes in bowel/urinary habits.  Medical History    PMH:    has a past medical history of Acute sinusitis, unspecified (02/01/2016), Encounter for initial prescription of contraceptive pills (08/10/2018), Encounter for removal of intrauterine contraceptive device (03/01/2016), Encounter for screening for malignant neoplasm of cervix (11/24/2015), Myopia, bilateral (12/01/2014), Other conditions influencing health status, Other specified health status (05/23/2017), Other specified symptoms and signs involving the digestive system and abdomen (07/09/2015), Pain in right shoulder (10/07/2020), Pelvic and perineal pain (05/28/2021), Personal history of other diseases of the digestive system (07/30/2015), Personal history of other diseases of the female genital tract (08/10/2018), Personal history of other diseases of the female genital tract (08/10/2018), Personal history of other diseases of the female genital tract (09/11/2015), Personal history of other diseases of the musculoskeletal system and connective tissue (10/13/2022), Personal history of other diseases of the respiratory system, Personal history of other specified conditions, and Unspecified astigmatism, bilateral (12/01/2014).   Allergies:   Allergies   Allergen Reactions    Benzonatate Unknown    Coconut Itching    Erythromycin-Benzoyl Peroxide Unknown    Fruit Flavor Unknown    Peach Itching    Pineapple Itching     Pollen Extracts Unknown    Sulfamethoxazole-Trimethoprim Swelling      Surgical Hx:   Past Surgical History:   Procedure Laterality Date    INVASIVE VASCULAR PROCEDURE N/A 4/15/2024    Procedure: VENOGRAM;  Surgeon: Marianna Ordaz MD;  Location: [facility];  Service: Vascular Surgery;  Laterality: N/A;  R jugular approach, pelvic venogram. 3% STS sclerotherapy    IR ANGIOGRAM INFERIOR EPIGASTRIC  2/9/2023    IR ANGIOGRAM INFERIOR EPIGASTRIC 2/9/2023 [facility]ICAL [facility]    IR ANGIOGRAM INFERIOR EPIGASTRIC PELVIC  2/9/2023    IR ANGIOGRAM INFERIOR EPIGASTRIC PELVIC 2/9/2023 [facility] CLINICAL [facility]GAC[facility]    IR ANGIOGRAM INFERIOR EPIGASTRIC PELVIC  2/9/2023    IR ANGIOGRAM INFERIOR EPIGASTRIC PELVIC 2/9/2023 [facility] CLINICAL [facility]GAC[facility]    IR VENOGRAM LOWER EXTREMITY Left 12/11/2023    IR VENOGRAM LOWER EXTREMITY 12/11/2023 Marianna Ordaz MD [facility] ANGIO    OTHER SURGICAL HISTORY  08/18/2020    Removal    OTHER SURGICAL HISTORY  07/31/2015    Surgery Suture Of Gum Laceration      Social HX:   Social History     Tobacco Use    Smoking status: Never     Passive exposure: Past    Smokeless tobacco: Never   Substance Use Topics    Alcohol use: Not Currently    Drug use: Never      MEDS:   Current Outpatient Medications   Medication Instructions    acetaminophen (TYLENOL) 650 mg, oral, Every 6 hours PRN    albuterol 90 mcg/actuation inhaler 1-2 puffs, inhalation, Every 4 hours PRN    albuterol 2.5 mg, inhalation, Every 4 hours PRN    cyclobenzaprine (Flexeril) 10 mg tablet TAKE ONE TABLET BY MOUTH AS NEEDED AT BEDTIME FOR MUSLE SPASMS    diazePAM (VALIUM) 5 mg, oral, Nightly PRN    estradiol (Estrace) 0.01 % (0.1 mg/gram) vaginal cream PLACE A PEA SIZED AMOUNT ON YOUR FINGER AND APPLY TO SORE AREA OF VULVA. USE NIGHTLY FOR ONE MONTH.    etonogestreL-ethinyl estradioL (Nuvaring) 0.12-0.015 mg/24 hr vaginal ring 1 each, vaginal, Every 28 days    HYDROcodone-acetaminophen (Norco) 5-325 mg tablet 1 tablet, oral, Every 6 hours PRN     hydrocortisone 2.5 % cream Apply twice daily to affected areas    ibuprofen 600 mg, oral, Every 6 hours PRN    lactulose 10 g, oral, 2 times daily PRN    mometasone (Nasonex) 50 mcg/actuation nasal spray 2 sprays, Each Nostril, 2 times daily    naproxen (Naprosyn) 500 mg tablet TAKE ONE TABLET BY MOUTH TWO TIMES A DAY AS NEEDED FOR MILD PAIN    ondansetron ODT (ZOFRAN-ODT) 4 mg, oral, Every 8 hours PRN    oxyCODONE (ROXICODONE) 5 mg, oral, Every 6 hours PRN    oxymetazoline (Afrin, oxymetazoline,) 0.05 % nasal spray 2 sprays, Each Nostril, Every 12 hours PRN, Do not use for more than 3 days.    pseudoephedrine-guaifenesin 120-1,200 mg tablet extended release 12 hr 1 tablet, oral, 2 times daily PRN      Immunizations:  Immunization History   Administered Date(s) Administered    Pfizer Purple Cap SARS-CoV-2 04/19/2021, 05/10/2021, 01/05/2022      Objective Data   There were no vitals taken for this visit.   Physical Examination:   GE; in no apparent distress, not acutely ill looking does not appear to be in any respiratory distress. Answers questions appropriately.     Last Labs:  CBC:   WBC   Date Value Ref Range Status   10/16/2024 9.2 4.4 - 11.3 x10*3/uL Final   07/29/2024 15.9 (H) 4.4 - 11.3 x10*3/uL Final   07/25/2024 16.2 (H) 4.4 - 11.3 x10*3/uL Final     Hemoglobin   Date Value Ref Range Status   10/16/2024 12.0 12.0 - 16.0 g/dL Final   07/29/2024 12.5 12.0 - 16.0 g/dL Final   07/25/2024 11.6 (L) 12.0 - 16.0 g/dL Final     MCV   Date Value Ref Range Status   10/16/2024 82 80 - 100 fL Final   07/29/2024 82 80 - 100 fL Final   07/25/2024 82 80 - 100 fL Final     Platelets   Date Value Ref Range Status   10/16/2024 312 150 - 450 x10*3/uL Final   07/29/2024 331 150 - 450 x10*3/uL Final   07/25/2024 316 150 - 450 x10*3/uL Final      CMP:   Sodium   Date Value Ref Range Status   10/16/2024 138 136 - 145 mmol/L Final   07/29/2024 137 136 - 145 mmol/L Final   07/25/2024 139 136 - 145 mmol/L Final     Potassium   Date  Value Ref Range Status   10/16/2024 3.6 3.5 - 5.3 mmol/L Final   07/29/2024 3.1 (L) 3.5 - 5.3 mmol/L Final   07/25/2024 4.8 3.5 - 5.3 mmol/L Final     Chloride   Date Value Ref Range Status   10/16/2024 104 98 - 107 mmol/L Final   07/29/2024 103 98 - 107 mmol/L Final   07/25/2024 107 98 - 107 mmol/L Final     Urea Nitrogen   Date Value Ref Range Status   10/16/2024 7 6 - 23 mg/dL Final   07/29/2024 13 6 - 23 mg/dL Final   07/25/2024 11 6 - 23 mg/dL Final     Creatinine   Date Value Ref Range Status   10/16/2024 0.89 0.50 - 1.05 mg/dL Final   07/29/2024 1.10 (H) 0.50 - 1.05 mg/dL Final   07/25/2024 0.86 0.50 - 1.05 mg/dL Final     eGFR   Date Value Ref Range Status   10/16/2024 88 >60 mL/min/1.73m*2 Final     Comment:     Calculations of estimated GFR are performed using the 2021 CKD-EPI Study Refit equation without the race variable for the IDMS-Traceable creatinine methods.  https://jasn.asnjournals.org/content/early/2021/09/22/ASN.[email or web address]   07/29/2024 69 >60 mL/min/1.73m*2 Final     Comment:     Calculations of estimated GFR are performed using the 2021 CKD-EPI Study Refit equation without the race variable for the IDMS-Traceable creatinine methods.  https://jasn.asnjournals.org/content/early/2021/09/22/ASN.[email or web address]   07/25/2024 >90 >60 mL/min/1.73m*2 Final     Comment:     Calculations of estimated GFR are performed using the 2021 CKD-EPI Study Refit equation without the race variable for the IDMS-Traceable creatinine methods.  https://jasn.asnjournals.org/content/early/2021/09/22/ASN.[email or web address]      Albumin   Date Value Ref Range Status   10/16/2024 4.0 3.4 - 5.0 g/dL Final   07/29/2024 3.4 3.4 - 5.0 g/dL Final   06/25/2024 3.9 3.4 - 5.0 g/dL Final     Phosphorus   Date Value Ref Range Status   02/13/2024 3.1 2.5 - 4.9 mg/dL Final     Comment:     The performance characteristics of phosphorus testing in heparinized plasma have been validated by the individual  laboratory site where testing is performed.  Testing on heparinized plasma is not approved by the FDA; however, such approval is not necessary.   05/27/2021 4.5 2.5 - 4.9 mg/dL Final     Comment:      The performance characteristics of phosphorus testing in   heparinized plasma have been validated by the individual     laboratory site where testing is performed. Testing    on heparinized plasma is not approved by the FDA;    however, such approval is not necessary.       A1c:   Hemoglobin A1C   Date Value Ref Range Status   12/08/2022 4.7 % Final     Comment:          Diagnosis of Diabetes-Adults   Non-Diabetic: < or = 5.6%   Increased risk for developing diabetes: 5.7-6.4%   Diagnostic of diabetes: > or = 6.5%  .       Monitoring of Diabetes                Age (y)     Therapeutic Goal (%)   Adults:          >18           <7.0   Pediatrics:    13-18           <7.5                   7-12           <8.0                   0- 6            7.5-8.5   American Diabetes Association. Diabetes Care 33(S1), Jan 2010.   Hemoglobin variant detected which does not interfere    with determination of Hemoglobin A1c. Hemoglobin   identification can be ordered to characterize the variant   if clinically indicated.     01/11/2022 5.1 % Final     Comment:          Diagnosis of Diabetes-Adults   Non-Diabetic: < or = 5.6%   Increased risk for developing diabetes: 5.7-6.4%   Diagnostic of diabetes: > or = 6.5%  .       Monitoring of Diabetes                Age (y)     Therapeutic Goal (%)   Adults:          >18           <7.0   Pediatrics:    13-18           <7.5                   7-12           <8.0                   0- 6            7.5-8.5   American Diabetes Association. Diabetes Care 33(S1), Jan 2010.   Hemoglobin variant detected which does not interfere    with determination of Hemoglobin A1c. Hemoglobin   identification can be ordered to characterize the variant   if clinically indicated.     08/07/2020 4.8 % Final     Comment:          Diagnosis of Diabetes-Adults    Non-Diabetic: < or = 5.6%   Increased risk for developing diabetes: 5.7-6.4%   Diagnostic of diabetes: > or = 6.5%  .       Monitoring of Diabetes                Age (y)     Therapeutic Goal (%)   Adults:          >18           <7.0   Pediatrics:    13-18           <7.5                   7-12           <8.0                   0- 6            7.5-8.5   American Diabetes Association. Diabetes Care 33(S1), Jan 2010.        Vit D:   Vitamin D, 25-Hydroxy   Date Value Ref Range Status   12/08/2022 22 (A) ng/mL Final     Comment:     .  DEFICIENCY:         < 20   NG/ML  INSUFFICIENCY:      20-29  NG/ML  SUFFICIENCY:         NG/ML    THIS ASSAY ACCURATELY QUANTIFIES THE SUM OF  VITAMIN D3, 25-HYDROXY AND VIT D2,25-HYDROXY.     05/17/2022 23 (A) ng/mL Final     Comment:     .  DEFICIENCY:         < 20   NG/ML  INSUFFICIENCY:      20-29  NG/ML  SUFFICIENCY:         NG/ML    THIS ASSAY ACCURATELY QUANTIFIES THE SUM OF  VITAMIN D3, 25-HYDROXY AND VIT D2,25-HYDROXY.     01/11/2022 27 (A) ng/mL Final     Comment:     .  DEFICIENCY:         < 20   NG/ML  INSUFFICIENCY:      20-29  NG/ML  SUFFICIENCY:         NG/ML    THIS ASSAY ACCURATELY QUANTIFIES THE SUM OF  VITAMIN D3, 25-HYDROXY AND VIT D2,25-HYDROXY.        TSH:  Thyroid Stimulating Hormone   Date Value Ref Range Status   11/30/2023 1.14 0.44 - 3.98 mIU/L Final     TSH   Date Value Ref Range Status   12/08/2022 0.70 0.44 - 3.98 mIU/L Final     Comment:      TSH testing is performed using different testing    methodology at [facility] than at other    [facility]. Direct result comparisons should    only be made within the same method.     01/11/2022 0.94 0.44 - 3.98 mIU/L Final     Comment:      TSH testing is performed using different testing    methodology at [facility] than at other    [facility]. Direct result comparisons should    only be made within the same method.        Assessment and Plan     Problem List  Items Addressed This Visit       Viral URI with cough    Relevant Medications    pseudoephedrine-guaifenesin 120-1,200 mg tablet extended release 12 hr    acetaminophen (Tylenol) 325 mg tablet    oxymetazoline (Afrin, oxymetazoline,) 0.05 % nasal spray      Armida Adkins is a 34 y.o. female presenting a pmh of asthma, endometriosis, anemia, GERD, migraine disorder who called into the clinic for sudden onset sore throat, headaches and rhinorrhea that started after she went voting yesterday. She described waking up today with headaches, horse voice, and sore throat. There is associated hx of subjective fevers, and nasal congestion. Pt has a hx of deviated nasal septum.     URTI  :: 2/2 viral etiology   Patient with sore throat, rhinorrhea.   Reports subjective fevers.   Symptomatic management.   Tylenol for pain   Oxymetazolin for nasal congestion.   Pseudoephedrine - guaifenesin  Encouraged to use mist  and warm compresses to help care nostrils  If symptoms persists advised to call us back, or go the ER for imaging studies further investigation.       Terrell Allen MD   Internal Medicne Resident  PGY 3

## 2024-11-10 ENCOUNTER — APPOINTMENT (OUTPATIENT)
Dept: RADIOLOGY | Facility: HOSPITAL | Age: 34
End: 2024-11-10
Payer: COMMERCIAL

## 2024-11-10 ENCOUNTER — HOSPITAL ENCOUNTER (EMERGENCY)
Facility: HOSPITAL | Age: 34
Discharge: HOME | End: 2024-11-10
Payer: COMMERCIAL

## 2024-11-10 VITALS
SYSTOLIC BLOOD PRESSURE: 122 MMHG | OXYGEN SATURATION: 97 % | HEIGHT: 64 IN | WEIGHT: 150 LBS | RESPIRATION RATE: 15 BRPM | BODY MASS INDEX: 25.61 KG/M2 | TEMPERATURE: 98.5 F | HEART RATE: 91 BPM | DIASTOLIC BLOOD PRESSURE: 80 MMHG

## 2024-11-10 DIAGNOSIS — J06.9 VIRAL UPPER RESPIRATORY TRACT INFECTION: Primary | ICD-10-CM

## 2024-11-10 LAB
FLUAV RNA RESP QL NAA+PROBE: NOT DETECTED
FLUBV RNA RESP QL NAA+PROBE: NOT DETECTED
HCG UR QL IA.RAPID: NEGATIVE
S PYO DNA THROAT QL NAA+PROBE: NOT DETECTED
SARS-COV-2 RNA RESP QL NAA+PROBE: NOT DETECTED

## 2024-11-10 PROCEDURE — 2500000005 HC RX 250 GENERAL PHARMACY W/O HCPCS: Performed by: NURSE PRACTITIONER

## 2024-11-10 PROCEDURE — 71045 X-RAY EXAM CHEST 1 VIEW: CPT

## 2024-11-10 PROCEDURE — 99283 EMERGENCY DEPT VISIT LOW MDM: CPT | Mod: 25

## 2024-11-10 PROCEDURE — 87651 STREP A DNA AMP PROBE: CPT | Performed by: NURSE PRACTITIONER

## 2024-11-10 PROCEDURE — 2500000001 HC RX 250 WO HCPCS SELF ADMINISTERED DRUGS (ALT 637 FOR MEDICARE OP): Performed by: NURSE PRACTITIONER

## 2024-11-10 PROCEDURE — 81025 URINE PREGNANCY TEST: CPT | Performed by: NURSE PRACTITIONER

## 2024-11-10 PROCEDURE — 71045 X-RAY EXAM CHEST 1 VIEW: CPT | Mod: FOREIGN READ | Performed by: RADIOLOGY

## 2024-11-10 PROCEDURE — 87636 SARSCOV2 & INF A&B AMP PRB: CPT | Performed by: NURSE PRACTITIONER

## 2024-11-10 RX ORDER — LIDOCAINE HYDROCHLORIDE 20 MG/ML
1.25 SOLUTION OROPHARYNGEAL ONCE
Status: COMPLETED | OUTPATIENT
Start: 2024-11-10 | End: 2024-11-10

## 2024-11-10 RX ORDER — ACETAMINOPHEN 325 MG/1
975 TABLET ORAL ONCE
Status: DISCONTINUED | OUTPATIENT
Start: 2024-11-10 | End: 2024-11-10 | Stop reason: HOSPADM

## 2024-11-10 RX ORDER — IBUPROFEN 400 MG/1
400 TABLET ORAL ONCE
Status: COMPLETED | OUTPATIENT
Start: 2024-11-10 | End: 2024-11-10

## 2024-11-10 ASSESSMENT — PAIN DESCRIPTION - LOCATION: LOCATION: THROAT

## 2024-11-10 ASSESSMENT — PAIN - FUNCTIONAL ASSESSMENT: PAIN_FUNCTIONAL_ASSESSMENT: 0-10

## 2024-11-10 ASSESSMENT — PAIN SCALES - GENERAL: PAINLEVEL_OUTOF10: 10 - WORST POSSIBLE PAIN

## 2024-11-10 NOTE — DISCHARGE INSTRUCTIONS
Follow up with PCP within two days for further evaluation and management of care    Symptomatic treatment. Over the counter Tylenol or Ibuprofen for pain or fever.    Follow up instructions discussed. ED precautions discussed and advised to return to ED if symptoms worsen or persist for follow up.

## 2024-11-10 NOTE — ED TRIAGE NOTES
Patient is having a sore throat with runny nose as of Tuesday. Today started a cough along with it. Complaints of headache as well and hot at times.

## 2024-11-10 NOTE — ED PROVIDER NOTES
HPI     CC: Cough and Sore Throat     HPI: Armida Adkins is a 34 y.o. female with past medical history of asthma, depressions, and migraines presents with complaints of cough x 4 days.  She endorses associated sore throat, runny nose, subjective fever, and headache.  She reports symptoms started after she went and voted on Tuesday. But otherwise denies known sick contact.  She is able to maintain her own secretions.  Able to swallow without difficulty   Has not taken anything for her symptoms.  She denies chest pain, shortness of breath, or palpitations.  She denies nausea or vomiting.       ROS: 10-point review of systems was performed and is otherwise negative except as noted in HPI.      Past Medical History: Noncontributory except per HPI     Past Surgical History: Noncontributory except per HPI     Family History: Reviewed and noncontributory     Social History:  Noncontributory except per HPI       Allergies   Allergen Reactions    Benzonatate Unknown    Coconut Itching    Erythromycin-Benzoyl Peroxide Unknown    Fruit Flavor Unknown    Peach Itching    Pineapple Itching    Pollen Extracts Unknown    Sulfamethoxazole-Trimethoprim Swelling       Past Medical History:   Diagnosis Date    Acute sinusitis, unspecified 2016    Acute rhinosinusitis    Encounter for initial prescription of contraceptive pills 08/10/2018    Encounter for initial prescription of contraceptive pills    Encounter for removal of intrauterine contraceptive device 2016    Encounter for IUD removal    Encounter for screening for malignant neoplasm of cervix 2015    Pap smear for cervical cancer screening    Myopia, bilateral 2014    Bilateral myopia    Other conditions influencing health status      0    Other specified health status 2017    Contraception    Other specified symptoms and signs involving the digestive system and abdomen 2015    Difficulty swallowing pills    Pain in right shoulder  10/07/2020    Right shoulder pain    Pelvic and perineal pain 05/28/2021    Pelvic pain    Personal history of other diseases of the digestive system 07/30/2015    History of hemorrhoids    Personal history of other diseases of the female genital tract 08/10/2018    History of vaginal discharge    Personal history of other diseases of the female genital tract 08/10/2018    History of vulvodynia    Personal history of other diseases of the female genital tract 09/11/2015    History of metrorrhagia    Personal history of other diseases of the musculoskeletal system and connective tissue 10/13/2022    History of scoliosis    Personal history of other diseases of the respiratory system     History of asthma    Personal history of other specified conditions     H/O shortness of breath    Unspecified astigmatism, bilateral 12/01/2014    Astigmatism, bilateral       Home Meds:   Current Outpatient Medications   Medication Instructions    acetaminophen (TYLENOL) 650 mg, oral, Every 6 hours PRN    albuterol 90 mcg/actuation inhaler 1-2 puffs, inhalation, Every 4 hours PRN    albuterol 2.5 mg, inhalation, Every 4 hours PRN    cyclobenzaprine (Flexeril) 10 mg tablet TAKE ONE TABLET BY MOUTH AS NEEDED AT BEDTIME FOR MUSLE SPASMS    diazePAM (VALIUM) 5 mg, oral, Nightly PRN    estradiol (Estrace) 0.01 % (0.1 mg/gram) vaginal cream PLACE A PEA SIZED AMOUNT ON YOUR FINGER AND APPLY TO SORE AREA OF VULVA. USE NIGHTLY FOR ONE MONTH.    etonogestreL-ethinyl estradioL (Nuvaring) 0.12-0.015 mg/24 hr vaginal ring 1 each, vaginal, Every 28 days    HYDROcodone-acetaminophen (Norco) 5-325 mg tablet 1 tablet, oral, Every 6 hours PRN    hydrocortisone 2.5 % cream Apply twice daily to affected areas    ibuprofen 600 mg, oral, Every 6 hours PRN    lactulose 10 g, oral, 2 times daily PRN    mometasone (Nasonex) 50 mcg/actuation nasal spray 2 sprays, Each Nostril, 2 times daily    naproxen (Naprosyn) 500 mg tablet TAKE ONE TABLET BY MOUTH TWO  "TIMES A DAY AS NEEDED FOR MILD PAIN    ondansetron ODT (ZOFRAN-ODT) 4 mg, oral, Every 8 hours PRN    oxyCODONE (ROXICODONE) 5 mg, oral, Every 6 hours PRN    oxymetazoline (Afrin, oxymetazoline,) 0.05 % nasal spray 2 sprays, Each Nostril, Every 12 hours PRN, Do not use for more than 3 days.    pseudoephedrine-guaifenesin 120-1,200 mg tablet extended release 12 hr 1 tablet, oral, 2 times daily PRN        ED Triage Vitals [11/10/24 0016]   Temperature Heart Rate Respirations BP   36.9 °C (98.5 °F) (!) 108 20 114/80      Pulse Ox Temp Source Heart Rate Source Patient Position   96 % Oral Monitor --      BP Location FiO2 (%)     -- --         Heart Rate:  []   Temperature:  [36.9 °C (98.5 °F)]   Respirations:  [15-20]   BP: (114-122)/(80)   Height:  [162.6 cm (5' 4\")]   Weight:  [68 kg (150 lb)]   Pulse Ox:  [96 %-97 %]      Physical Exam:  Physical Exam  Vitals and nursing note reviewed.   Constitutional:       General: She is not in acute distress.     Appearance: She is well-developed.   HENT:      Head: Normocephalic and atraumatic.   Eyes:      Conjunctiva/sclera: Conjunctivae normal.   Cardiovascular:      Rate and Rhythm: Normal rate and regular rhythm.      Heart sounds: No murmur heard.  Pulmonary:      Effort: Pulmonary effort is normal. No respiratory distress.      Breath sounds: Normal breath sounds.   Abdominal:      Palpations: Abdomen is soft.      Tenderness: There is no abdominal tenderness.   Musculoskeletal:         General: No swelling.      Cervical back: Neck supple.   Skin:     General: Skin is warm and dry.      Capillary Refill: Capillary refill takes less than 2 seconds.   Neurological:      Mental Status: She is alert.   Psychiatric:         Mood and Affect: Mood normal.          Diagnostic Results        Labs Reviewed   GROUP A STREPTOCOCCUS, PCR - Normal       Result Value    Group A Strep PCR Not Detected     INFLUENZA A AND B PCR - Normal    Flu A Result Not Detected      Flu B " Result Not Detected      Narrative:     This assay is an in vitro diagnostic multiplex nucleic acid amplification test for the detection and discrimination of Influenza A & B from nasopharyngeal specimens, and has been validated for use at [facility]. Negative results do not preclude Influenza A/B infections, and should not be used as the sole basis for diagnosis, treatment, or other management decisions. If Influenza A/B and RSV PCR results are negative, testing for Parainfluenza virus, Adenovirus and Metapneumovirus is routinely performed for [facility] pediatric oncology and intensive care inpatients, and is available on other patients by placing an add-on request.   SARS-COV-2 PCR - Normal    Coronavirus 2019, PCR Not Detected      Narrative:     This assay has received FDA Emergency Use Authorization (EUA) and is only authorized for the duration of time that circumstances exist to justify the authorization of the emergency use of in vitro diagnostic tests for the detection of SARS-CoV-2 virus and/or diagnosis of COVID-19 infection under section 564(b)(1) of the Act, 21 U.S.C. 360bbb-3(b)(1). This assay is an in vitro diagnostic nucleic acid amplification test for the qualitative detection of SARS-CoV-2 from nasopharyngeal specimens and has been validated for use at [facility]. Negative results do not preclude COVID-19 infections and should not be used as the sole basis for diagnosis, treatment, or other management decisions.     HCG, URINE, QUALITATIVE - Normal    HCG, Urine NEGATIVE     SARS-COV-2 PCR         XR chest 1 view   Final Result   No acute abnormality.   Signed by Ed Saldana DO                    Patti Coma Scale Score: 15                  Procedure  Procedures    ED Course & MDM   Assessment/Plan:     Medications   acetaminophen (Tylenol) tablet 975 mg (975 mg oral Not Given 11/10/24 0250)   ibuprofen tablet 400 mg (400 mg oral Given 11/10/24 0250)    lidocaine (Xylocaine) 2 % mouth solution 1.25 mL (1.25 mL Mouth/Throat Given 11/10/24 0250)        Diagnoses as of 11/10/24 0433   Viral upper respiratory tract infection       Medical Decision Making    Armida Adkins is a 34 y.o. female with past medical history of asthma, depressions, and migraines presents with complaints of cough, ore throat, runny nose, subjective fever, and headache x 4 days. She apparently developed symptoms after symptoms voting on Tuesday, otherwise denies known sick contact.  She is able to maintain her own secretions, swallow without difficulty. She denies chest pain, shortness of breath, or palpitations, nausea or vomiting.  Differential diagnosis URI, pneumonia, COVID, flu, group A streptococcus.  On exam she is alert and oriented x 3 no acute distress noted.  Nontoxic-appearing.  Afebrile with mild tachycardia at 108 which is improved at 91.  Bilateral ear canal and TMs clear.  No lymphadenopathy.  Lungs are clear throughout.  There is an occasional cough.  Heart is regular rate and rhythm.   Less concern for COVID viral swabs obtained and they are negative.  Throat/tonsil without erythema, without swelling, without exudate less likely group A/B streptococcus.  Given tachycardia and frequent cough concerns for pneumonia chest x-ray obtain and is negative for acute cardiopulmonary process.  She is given ibuprofen 400 mg p.o. and acetaminophen 975 p.o. but states she is unable to swallow pills and refuses medication and notes that she will take chewables when she gets home.  Viscous lidocaine given to the soothe throat pain.    I discussed this patient's care with Dr. Marin who also evaluated the patient.    Symptoms are suggestive of a viral syndrome.  She is advised to follow up with PCP within two days for further evaluation and management of care    Symptomatic treatment. Over the counter Tylenol or Ibuprofen for pain or fever.    Follow up instructions discussed. ED  precautions discussed and advised to return to ED if symptoms worsen or persist for follow up.    Counseling: Spoke with the patient and discussed today´s findings, in addition to providing specific details for the plan of care and expected course. Patient was given the opportunity to ask questions     She expressed understanding was agreeable with plan and discharge at this time. Discharged in hemodynamically stable condition.      Disposition: Home    ED Prescriptions    None         Social Determinants Affecting Care: none     ALISON Montanez-CNP    This note was dictated by speech recognition. Minor errors in transcription may be present.     ALISON Rao-TOMASA  11/10/24 0512

## 2024-11-20 ENCOUNTER — OFFICE VISIT (OUTPATIENT)
Dept: GASTROENTEROLOGY | Facility: CLINIC | Age: 34
End: 2024-11-20
Payer: COMMERCIAL

## 2024-11-20 VITALS
BODY MASS INDEX: 27.83 KG/M2 | SYSTOLIC BLOOD PRESSURE: 107 MMHG | WEIGHT: 163 LBS | HEART RATE: 101 BPM | HEIGHT: 64 IN | TEMPERATURE: 98.1 F | DIASTOLIC BLOOD PRESSURE: 76 MMHG

## 2024-11-20 DIAGNOSIS — K59.00 CONSTIPATION, UNSPECIFIED CONSTIPATION TYPE: Primary | ICD-10-CM

## 2024-11-20 PROCEDURE — 99213 OFFICE O/P EST LOW 20 MIN: CPT | Performed by: NURSE PRACTITIONER

## 2024-11-20 PROCEDURE — 3008F BODY MASS INDEX DOCD: CPT | Performed by: NURSE PRACTITIONER

## 2024-11-20 RX ORDER — MIRTAZAPINE 15 MG/1
15 TABLET, FILM COATED ORAL NIGHTLY
COMMUNITY
Start: 2024-11-05

## 2024-11-20 NOTE — PROGRESS NOTES
Subjective   Patient ID: Armida Adkins is a 34 y.o. female.    HPI  34-year-old female for follow-up after colonoscopy  Previously seen 2/7/2020 for  At that visit she had an abnormal CT of her sigmoid colon and rectum 10/15/2024 colonoscopy was performed and was normal  Medical history includes asthma, depression, migraines, endometriosis, GERD  BM; once daily to every few days  Incomplete at time  Hard stools  Water- 3 bottles per day  Exercise- no  Has been having lower back pain  Eating ok        Objective   Physical Exam  Cardiovascular:      Rate and Rhythm: Normal rate and regular rhythm.      Pulses: Normal pulses.      Heart sounds: Normal heart sounds.   Pulmonary:      Effort: Pulmonary effort is normal.      Breath sounds: Normal breath sounds.   Abdominal:      General: Bowel sounds are normal.      Palpations: Abdomen is soft.         Assessment/Plan     Constipation- I would recommend adding benefiber one tablespoon daily in a glass of water to help you have a more complete Bm, you can also use a foot stool in front of the toilet to help with positioning    Please let me know how you are doing in about a month

## 2024-11-20 NOTE — PATIENT INSTRUCTIONS
Constipation- I would recommend adding benefiber one tablespoon daily in a glass of water to help you have a more complete Bm, you can also use a foot stool in front of the toilet to help with positioning    Please let me know how you are doing in about a month

## 2024-12-18 ENCOUNTER — LAB (OUTPATIENT)
Dept: LAB | Facility: LAB | Age: 34
End: 2024-12-18
Payer: COMMERCIAL

## 2024-12-18 ENCOUNTER — APPOINTMENT (OUTPATIENT)
Dept: PRIMARY CARE | Facility: CLINIC | Age: 34
End: 2024-12-18
Payer: COMMERCIAL

## 2024-12-18 VITALS
HEART RATE: 91 BPM | WEIGHT: 168 LBS | DIASTOLIC BLOOD PRESSURE: 73 MMHG | SYSTOLIC BLOOD PRESSURE: 106 MMHG | HEIGHT: 64 IN | BODY MASS INDEX: 28.68 KG/M2

## 2024-12-18 DIAGNOSIS — M54.42 CHRONIC LEFT-SIDED LOW BACK PAIN WITH LEFT-SIDED SCIATICA: Primary | ICD-10-CM

## 2024-12-18 DIAGNOSIS — G89.29 CHRONIC LEFT-SIDED LOW BACK PAIN WITH LEFT-SIDED SCIATICA: Primary | ICD-10-CM

## 2024-12-18 DIAGNOSIS — M54.12 CERVICAL RADICULITIS: Chronic | ICD-10-CM

## 2024-12-18 DIAGNOSIS — Z00.00 HEALTH CARE MAINTENANCE: ICD-10-CM

## 2024-12-18 DIAGNOSIS — E87.6 HYPOKALEMIA: ICD-10-CM

## 2024-12-18 DIAGNOSIS — E55.9 VITAMIN D DEFICIENCY: ICD-10-CM

## 2024-12-18 DIAGNOSIS — R06.83 SNORING: ICD-10-CM

## 2024-12-18 DIAGNOSIS — Z00.00 ROUTINE GENERAL MEDICAL EXAMINATION AT HEALTH CARE FACILITY: ICD-10-CM

## 2024-12-18 DIAGNOSIS — J45.20 MILD INTERMITTENT ASTHMA WITHOUT COMPLICATION (HHS-HCC): ICD-10-CM

## 2024-12-18 DIAGNOSIS — F32.A DEPRESSION, UNSPECIFIED DEPRESSION TYPE: ICD-10-CM

## 2024-12-18 DIAGNOSIS — J30.89 ENVIRONMENTAL AND SEASONAL ALLERGIES: ICD-10-CM

## 2024-12-18 LAB
25(OH)D3 SERPL-MCNC: 15 NG/ML (ref 30–100)
ALBUMIN SERPL BCP-MCNC: 4.1 G/DL (ref 3.4–5)
ALP SERPL-CCNC: 104 U/L (ref 33–110)
ALT SERPL W P-5'-P-CCNC: 12 U/L (ref 7–45)
ANION GAP SERPL CALC-SCNC: 10 MMOL/L (ref 10–20)
AST SERPL W P-5'-P-CCNC: 11 U/L (ref 9–39)
BILIRUB SERPL-MCNC: 0.4 MG/DL (ref 0–1.2)
BUN SERPL-MCNC: 12 MG/DL (ref 6–23)
CALCIUM SERPL-MCNC: 9.4 MG/DL (ref 8.6–10.6)
CHLORIDE SERPL-SCNC: 104 MMOL/L (ref 98–107)
CHOLEST SERPL-MCNC: 212 MG/DL (ref 0–199)
CHOLESTEROL/HDL RATIO: 3.8
CO2 SERPL-SCNC: 27 MMOL/L (ref 21–32)
CREAT SERPL-MCNC: 0.96 MG/DL (ref 0.5–1.05)
EGFRCR SERPLBLD CKD-EPI 2021: 81 ML/MIN/1.73M*2
EST. AVERAGE GLUCOSE BLD GHB EST-MCNC: 94 MG/DL
GLUCOSE SERPL-MCNC: 90 MG/DL (ref 74–99)
HBA1C MFR BLD: 4.9 %
HCV AB SER QL: NONREACTIVE
HDLC SERPL-MCNC: 55.5 MG/DL
HIV 1+2 AB+HIV1 P24 AG SERPL QL IA: NONREACTIVE
LDLC SERPL CALC-MCNC: 136 MG/DL
MAGNESIUM SERPL-MCNC: 2.32 MG/DL (ref 1.6–2.4)
NON HDL CHOLESTEROL: 157 MG/DL (ref 0–149)
POTASSIUM SERPL-SCNC: 4.2 MMOL/L (ref 3.5–5.3)
PROT SERPL-MCNC: 7.3 G/DL (ref 6.4–8.2)
SODIUM SERPL-SCNC: 137 MMOL/L (ref 136–145)
TRIGL SERPL-MCNC: 101 MG/DL (ref 0–149)
TSH SERPL-ACNC: 1.02 MIU/L (ref 0.44–3.98)
VIT B12 SERPL-MCNC: 435 PG/ML (ref 211–911)
VLDL: 20 MG/DL (ref 0–40)

## 2024-12-18 PROCEDURE — 80053 COMPREHEN METABOLIC PANEL: CPT

## 2024-12-18 PROCEDURE — 99214 OFFICE O/P EST MOD 30 MIN: CPT | Performed by: INTERNAL MEDICINE

## 2024-12-18 PROCEDURE — 1036F TOBACCO NON-USER: CPT | Performed by: INTERNAL MEDICINE

## 2024-12-18 PROCEDURE — 80061 LIPID PANEL: CPT

## 2024-12-18 PROCEDURE — 86803 HEPATITIS C AB TEST: CPT

## 2024-12-18 PROCEDURE — 83735 ASSAY OF MAGNESIUM: CPT

## 2024-12-18 PROCEDURE — 84443 ASSAY THYROID STIM HORMONE: CPT

## 2024-12-18 PROCEDURE — 3008F BODY MASS INDEX DOCD: CPT | Performed by: INTERNAL MEDICINE

## 2024-12-18 PROCEDURE — 87389 HIV-1 AG W/HIV-1&-2 AB AG IA: CPT

## 2024-12-18 PROCEDURE — 82607 VITAMIN B-12: CPT

## 2024-12-18 PROCEDURE — 83036 HEMOGLOBIN GLYCOSYLATED A1C: CPT

## 2024-12-18 PROCEDURE — 36415 COLL VENOUS BLD VENIPUNCTURE: CPT

## 2024-12-18 PROCEDURE — 82306 VITAMIN D 25 HYDROXY: CPT

## 2024-12-18 RX ORDER — CYCLOBENZAPRINE HYDROCHLORIDE 7.5 MG/1
7.5 TABLET, FILM COATED ORAL NIGHTLY PRN
Qty: 30 TABLET | Refills: 1 | Status: SHIPPED | OUTPATIENT
Start: 2024-12-18

## 2024-12-18 RX ORDER — ALBUTEROL SULFATE 90 UG/1
1-2 INHALANT RESPIRATORY (INHALATION) EVERY 4 HOURS PRN
Qty: 18 G | Refills: 3 | Status: SHIPPED | OUTPATIENT
Start: 2024-12-18

## 2024-12-18 RX ORDER — NAPROXEN 500 MG/1
500 TABLET ORAL 2 TIMES DAILY PRN
Qty: 60 TABLET | Refills: 0 | Status: SHIPPED | OUTPATIENT
Start: 2024-12-18

## 2024-12-18 ASSESSMENT — LIFESTYLE VARIABLES
HOW MANY STANDARD DRINKS CONTAINING ALCOHOL DO YOU HAVE ON A TYPICAL DAY: PATIENT DOES NOT DRINK
SKIP TO QUESTIONS 9-10: 1
AUDIT-C TOTAL SCORE: 0
HOW OFTEN DO YOU HAVE A DRINK CONTAINING ALCOHOL: NEVER
HOW OFTEN DO YOU HAVE SIX OR MORE DRINKS ON ONE OCCASION: NEVER

## 2024-12-18 ASSESSMENT — ENCOUNTER SYMPTOMS
DYSURIA: 0
SHORTNESS OF BREATH: 1
FEVER: 0
CHILLS: 0
BACK PAIN: 1
DIARRHEA: 0

## 2024-12-18 ASSESSMENT — PATIENT HEALTH QUESTIONNAIRE - PHQ9
2. FEELING DOWN, DEPRESSED OR HOPELESS: NOT AT ALL
1. LITTLE INTEREST OR PLEASURE IN DOING THINGS: NOT AT ALL
SUM OF ALL RESPONSES TO PHQ9 QUESTIONS 1 AND 2: 0

## 2024-12-18 NOTE — PROGRESS NOTES
"Subjective   Patient ID: Armida Adkins is a 34 y.o. female who presents for Follow-up.    34f with asthma, depression, hx pelvic congestion syndrome s/p embolization, hx chronic pelvic pain and endometriosis, hx AVNRT s/p ablation 2023, anemia, GERD, migraine disorder who presents for a follow up visit.   This visit, pt presents w lower back pain, last 3 months constant, sometimes hurts to walk. Cant lay on back, lays on stomach now. sometimes goes down leg on left, mostly stays in back, sometimes left leg weak not numb. Issues with BMs and constipation which began before. No chills. Has hx nerve pain before. Can walk, is \"a little hard\" to walk.   Has seen PT before 8 to 9/2024. Uses Flexeril. Has a \"curved spine\", scoliosis.   Gained weight in last couple months, more tired during day.   Used Cymbalta before and got nausea before. Sees psych, takes Remeron now 15mg nightly.   Has albuterol, uses 1 to 2x a week, uses when getting groceries.  Left hand numb when lays on left side. Fingers left 2 to 5 numb.   Interested in allergy referral again. Had lavender sprayed recently, had shortness of breath and cough episode from it.            Review of Systems   Constitutional:  Negative for chills and fever.   Respiratory:  Positive for shortness of breath.    Gastrointestinal:  Negative for diarrhea.   Genitourinary:  Negative for decreased urine volume and dysuria.   Musculoskeletal:  Positive for back pain.   All other systems reviewed and are negative.      Objective   /73 (BP Location: Right arm, Patient Position: Sitting, BP Cuff Size: Adult)   Pulse 91   Ht 1.626 m (5' 4\")   Wt 76.2 kg (168 lb)   BMI 28.84 kg/m²     Physical Exam  Vitals reviewed.   Constitutional:       General: She is not in acute distress.  HENT:      Head: Normocephalic and atraumatic.   Eyes:      Extraocular Movements: Extraocular movements intact.      Conjunctiva/sclera: Conjunctivae normal.   Cardiovascular:      Rate and " Rhythm: Normal rate and regular rhythm.      Heart sounds: Murmur heard.      No friction rub. No gallop.      Comments: 2/6 systolic murmur best heard 2nd left intercostal space     Pulmonary:      Effort: Pulmonary effort is normal.      Breath sounds: Normal breath sounds. No wheezing, rhonchi or rales.   Abdominal:      General: Bowel sounds are normal.      Palpations: Abdomen is soft. There is no mass.      Tenderness: There is no abdominal tenderness. There is no guarding or rebound.   Musculoskeletal:         General: No tenderness.      Cervical back: Neck supple.      Right lower leg: No edema.      Left lower leg: No edema.   Skin:     General: Skin is warm and dry.      Findings: No bruising or rash.   Neurological:      Mental Status: She is alert. Mental status is at baseline.      Comments: Answering questions, following commands. Moving all extremities.    Psychiatric:         Mood and Affect: Mood and affect normal.         Assessment/Plan   Problem List Items Addressed This Visit       Asthma, intermittent ([facility])     Uses albuterol 1 to 2 times a week.   Consider combined kevin steroid inhaler if worsening symptoms.            Relevant Medications    albuterol 90 mcg/actuation inhaler    Depression     Has been more fatigued during the day and has had weight gain.   Try decreasing mirtazapine 7.5mg nightly; follow up with psych. Go back to 15 mg if symptoms get worse           Environmental and seasonal allergies    Relevant Orders    Referral to Allergy    Chronic left-sided low back pain with left-sided sciatica - Primary     Ddx includes sciatica, lumbar back pain, endometriosis?     Continue physical therapy. Home exercises printout provided   Take Tylenol, cyclobenzaprine and naproxen as needed.    Follow up with ortho spine.            Relevant Medications    cyclobenzaprine (Fexmid) 7.5 mg tablet    naproxen (Naprosyn) 500 mg tablet    Other Relevant Orders    Referral to Physical Therapy     Cervical radiculitis (Chronic)     Left hand symptoms most recently with tingling in 2nd to 5th fingers. Ddx includes cervical or axillary radiculopathy. Less likely carpal or ulnar tunnel, though possible.     Continue naproxen prn.   Follow up with ortho surgery and PT.            Snoring    Relevant Orders    Home sleep apnea test (HSAT)     #health Maintenance  Colonoscopy - last 10/2024, next when age 47.   PAP - last 7/2023, negative cytology and hpv. Follows with gyn   Labs: ordered and pt can get   Immunizations: flu shot can get this season, tetanus vaccine none found, can get

## 2024-12-18 NOTE — ASSESSMENT & PLAN NOTE
Has been more fatigued during the day and has had weight gain.   Try decreasing mirtazapine 7.5mg nightly; follow up with psych. Go back to 15 mg if symptoms get worse

## 2024-12-18 NOTE — ASSESSMENT & PLAN NOTE
Left hand symptoms most recently with tingling in 2nd to 5th fingers. Ddx includes cervical or axillary radiculopathy. Less likely carpal or ulnar tunnel, though possible.     Continue naproxen prn.   Follow up with ortho surgery and PT.

## 2024-12-18 NOTE — ASSESSMENT & PLAN NOTE
Uses albuterol 1 to 2 times a week.   Consider combined kevin steroid inhaler if worsening symptoms.

## 2024-12-18 NOTE — ASSESSMENT & PLAN NOTE
Ddx includes sciatica, lumbar back pain, endometriosis?     Continue physical therapy. Home exercises printout provided   Take Tylenol, cyclobenzaprine and naproxen as needed.    Follow up with ortho spine.

## 2024-12-18 NOTE — PATIENT INSTRUCTIONS
Please get your bloodwork as ordered by Dr. Lorin Valle Remeron 7.5mg nightly   Please to home back exercises and follow up with physical therapy, orthopedic spine surgery

## 2024-12-19 ENCOUNTER — TELEPHONE (OUTPATIENT)
Dept: PRIMARY CARE | Facility: CLINIC | Age: 34
End: 2024-12-19
Payer: COMMERCIAL

## 2024-12-30 ENCOUNTER — PROCEDURE VISIT (OUTPATIENT)
Dept: SLEEP MEDICINE | Facility: HOSPITAL | Age: 34
End: 2024-12-30
Payer: COMMERCIAL

## 2024-12-30 DIAGNOSIS — R06.83 SNORING: ICD-10-CM

## 2024-12-30 PROCEDURE — 95806 SLEEP STUDY UNATT&RESP EFFT: CPT | Performed by: PSYCHIATRY & NEUROLOGY

## 2025-01-02 ENCOUNTER — DOCUMENTATION (OUTPATIENT)
Dept: PHYSICAL THERAPY | Facility: CLINIC | Age: 35
End: 2025-01-02
Payer: COMMERCIAL

## 2025-01-02 NOTE — PROGRESS NOTES
Physical Therapy    Discharge Summary    Name: Armida Adkins  MRN: [id number]  : 1990  Date: 25    Discharge Summary: PT    Discharge Information: Date of discharge 25, Date of last visit 24, Date of evaluation 24, Number of attended visits 3, Referred by Dr. Mueller, and Referred for neck pain, dyspareunia        Rehab Discharge Reason: Failed to schedule and/or keep follow-up appointment(s)

## 2025-01-07 ENCOUNTER — OFFICE VISIT (OUTPATIENT)
Dept: ORTHOPEDIC SURGERY | Facility: CLINIC | Age: 35
End: 2025-01-07
Payer: COMMERCIAL

## 2025-01-07 VITALS — BODY MASS INDEX: 28.68 KG/M2 | HEIGHT: 64 IN | WEIGHT: 168 LBS

## 2025-01-07 DIAGNOSIS — M54.12 CERVICAL RADICULOPATHY: Primary | ICD-10-CM

## 2025-01-07 DIAGNOSIS — M41.25 OTHER IDIOPATHIC SCOLIOSIS, THORACOLUMBAR REGION: ICD-10-CM

## 2025-01-07 PROCEDURE — 99214 OFFICE O/P EST MOD 30 MIN: CPT

## 2025-01-07 PROCEDURE — G2211 COMPLEX E/M VISIT ADD ON: HCPCS

## 2025-01-07 PROCEDURE — 3008F BODY MASS INDEX DOCD: CPT

## 2025-01-07 RX ORDER — GABAPENTIN 300 MG/1
300 CAPSULE ORAL NIGHTLY
Qty: 30 CAPSULE | Refills: 2 | Status: SHIPPED | OUTPATIENT
Start: 2025-01-07 | End: 2025-04-07

## 2025-01-07 ASSESSMENT — PAIN - FUNCTIONAL ASSESSMENT: PAIN_FUNCTIONAL_ASSESSMENT: 0-10

## 2025-01-07 ASSESSMENT — PAIN DESCRIPTION - DESCRIPTORS: DESCRIPTORS: NUMBNESS;TINGLING

## 2025-01-07 NOTE — PROGRESS NOTES
HPI:  Armida Adkins is a 34-year-old female who presents today with a 2-month history of left hand/finger numbness and tingling which is worse at night when laying on her left side.  She does have a history of mild cervical canal stenosis at C6-C7 secondary to disc bulge with moderate right foraminal stenosis.  This improved with conservative management of physical therapy.  However, her symptoms are now on the left side.  She has been doing her physical therapy exercises which are not helping.  She is not taking anything for the pain.  Occasionally, she does states she is difficulty with balance coordination and is randomly dropping things.  She believes this is more due to the numbness and tingling.      Additionally, she does report a 6-month history of low back pain which radiates down the legs bilaterally.  She has a history of scoliosis.  Recently, her PCP referred her to physical therapy.  No other questions or concerns at time of visit.    ROS:  Reviewed on EMR and patient intake sheet.    PMH/SH:  Reviewed on EMR and patient intake sheet.    Exam:  MSK: Full strength range of motion of upper extremities bilaterally.  Negative Misti's, negative Spurling's.  General: No acute distress. Awake and conversant.  Eyes: Normal conjunctiva, anicteric. Round symmetric pupils.  ENT: Hearing grossly intact. No nasal discharge.  Neck: Neck is supple. No masses or thyromegaly.  Respiratory: Respirations are non-labored. No wheezing.  Skin: Warm. No rashes or ulcers.  Psych: Alert and oriented. Cooperative, appropriate mood and affect, normal judgement.  CV: No lower extremity edema.  Neuro: Sensation and CN II-XII grossly normal.    Radiology:     No new images reviewed today    Diagnosis:    Cervical radiculopathy  Scoliosis  Lumbar radiculopathy    Assessment and Plan:  Patient was seen today evaluate for a 2-month history of left-sided cervical radicular symptoms.  At this time, I recommended continuing her  physical therapy exercises at home, exercise, gentle stretching, over-the-counter NSAIDs and Tylenol, and I prescribed gabapentin derangement Ronald nightly.  She was referred to pain management for epidural injections.  She should follow-up after completion of this.    Additionally, I recommend the patient follow-up after physical therapy for her back and lower extremity pain.  At that point, we will obtain new scoliosis x-rays, consider a new MRI of her lumbar spine depending on her symptoms.    Patient feels her questions were answered today.  Patient agrees to the plan above.    **This note was dictated using speech recognition software and was not corrected for spelling or grammatical errors**    Jay Huerta PA-C  Department of Orthopaedic Surgery  10:28 AM  01/07/25    [address] [place] [address]    Voicemail: [phone]   Appts: [phone]  Fax: [phone]

## 2025-01-15 ENCOUNTER — DOCUMENTATION (OUTPATIENT)
Dept: PHYSICAL THERAPY | Facility: CLINIC | Age: 35
End: 2025-01-15

## 2025-01-15 ENCOUNTER — ALLIED HEALTH (OUTPATIENT)
Dept: INTEGRATIVE MEDICINE | Facility: CLINIC | Age: 35
End: 2025-01-15
Payer: MEDICARE

## 2025-01-15 VITALS
OXYGEN SATURATION: 97 % | BODY MASS INDEX: 28.68 KG/M2 | SYSTOLIC BLOOD PRESSURE: 111 MMHG | WEIGHT: 168 LBS | DIASTOLIC BLOOD PRESSURE: 75 MMHG | HEART RATE: 96 BPM | HEIGHT: 64 IN

## 2025-01-15 DIAGNOSIS — R10.2 PELVIC PAIN: Primary | ICD-10-CM

## 2025-01-15 DIAGNOSIS — K58.1 IRRITABLE BOWEL SYNDROME WITH CONSTIPATION: ICD-10-CM

## 2025-01-15 DIAGNOSIS — E55.9 VITAMIN D DEFICIENCY: ICD-10-CM

## 2025-01-15 DIAGNOSIS — N89.8 VAGINAL DRYNESS: ICD-10-CM

## 2025-01-15 DIAGNOSIS — N97.9 INFERTILITY, FEMALE: ICD-10-CM

## 2025-01-15 DIAGNOSIS — E78.00 ELEVATED CHOLESTEROL: ICD-10-CM

## 2025-01-15 PROCEDURE — 99215 OFFICE O/P EST HI 40 MIN: CPT | Performed by: INTERNAL MEDICINE

## 2025-01-15 PROCEDURE — G2211 COMPLEX E/M VISIT ADD ON: HCPCS | Performed by: INTERNAL MEDICINE

## 2025-01-15 RX ORDER — ERGOCALCIFEROL 1.25 MG/1
1.25 CAPSULE ORAL 2 TIMES WEEKLY
Qty: 8 CAPSULE | Refills: 0 | Status: SHIPPED | OUTPATIENT
Start: 2025-01-16 | End: 2026-01-16

## 2025-01-15 ASSESSMENT — ENCOUNTER SYMPTOMS
NERVOUS/ANXIOUS: 0
DYSURIA: 0
FATIGUE: 0
SHORTNESS OF BREATH: 0
MYALGIAS: 0
ARTHRALGIAS: 0
FEVER: 0
COUGH: 0
SLEEP DISTURBANCE: 0
HEADACHES: 0
DIFFICULTY URINATING: 0
APPETITE CHANGE: 0
WEAKNESS: 0
BACK PAIN: 0
CONSTIPATION: 1

## 2025-01-15 NOTE — ASSESSMENT & PLAN NOTE
Optimize this. Very important for her depression. Rx sent and then instructed patient to start high dose vitamin D 5000 International Units daily to maintain her level. Suggest pcp repeat in 4-6 months to document improvement.

## 2025-01-15 NOTE — ASSESSMENT & PLAN NOTE
Does not eat enough fiber in her diet. Discussed fruit, water, ground flax seed with oatmeal. Continue to discuss with primary care. Probably lack of fiber and high meat diet is contributing to her elevated choelsterol as well.

## 2025-01-15 NOTE — ASSESSMENT & PLAN NOTE
Discussed decrease animal products and more fiber in diet- replace some of her meat cheese and eggs with beans and lentils. Eat more fruit/ oats./ vegetables.

## 2025-01-15 NOTE — PROGRESS NOTES
Integrative Medicine Visit:     Subjective   Patient ID: Armida Adkins is a 34 y.o. female who presents for Dyspareunia       HPI  Presents for pain with intercourse.  She began having trouble with this in 2019; she tried to get pregnant for a year. Tried estrogen cream for a month.  Sex is very dry at all times.  She has to use a lubrication for her to have intercourse with her partner. No history of sexual trauma.  She has anxiety and depression. She does not want to have sex but she tries because her  wants to have sex. Apparently she has caught him texting other women. Sometimes she does get a sharp pain in her vagina area. Has had several procedurs for vascular issues in her pelvic area.      Really bad pain in right shoulder and neck- takes gabapentin for this.     Depression: not currently taking her remeron. Sees a psychiatrist. Had to skip last appointment because she could not log on because of a bill that was outstanding. She intends to call for an appointment.     CONCERNS:  wants help with chronic pelvic pain and vaginal dryness.     PMH:  hx of blood vessel surgery that patient does not really understand.   Asthma  Scoliosis  Surgery for her heart - had an ablation by cardiology (a fib RVR)  Left renal vein nutcracker syndrome.   Pain in both hands.   FamMH:  mom had cancer three times- ovarian cancer twice and has a brain tumor  Sister has ovarian cance.r   Father had a stroke. - stresfful. He is in a nursing home. He has prostate cancer.     SOC:  work at [employer]. . No kids - trying since 2019  does 20 hours per week at [employer].     NUTRITION: breakfast : does not eat anything. Skips   Lunch: skips eating again.   Dinner: steak, shrimp, chicken and mushrooms  Smoking:  non-smoker    Alcohol use:   used to drink alcohol pretty heavy in the past around the time of his dad's stroke. 2018.  Stopped drinking in 2024 June because she became suicidal. Has a therapist and a psychiatrist.  "    Exercise:  not at all want walk well because she has asthma. Gets exercise from load boxes onto carts.     SLEEP: difficulty sleeping. Wakes frequently,.     STRESS MANAGEMENT: went to a mental health hospital last year in June 2024.,  SUPPORT: has a good friend.  sometimes.      Review of Systems   Constitutional:  Negative for appetite change, fatigue and fever.   HENT:  Negative for congestion and hearing loss.    Eyes:  Negative for visual disturbance.   Respiratory:  Negative for cough and shortness of breath.    Cardiovascular:  Negative for chest pain and leg swelling.   Gastrointestinal:  Positive for constipation.   Genitourinary:  Negative for difficulty urinating, dysuria and urgency.   Musculoskeletal:  Negative for arthralgias, back pain and myalgias.   Skin:  Negative for rash.   Neurological:  Negative for weakness and headaches.   Psychiatric/Behavioral:  Negative for behavioral problems and sleep disturbance. The patient is not nervous/anxious.             Pain:    Objective   /75 (BP Location: Left arm, Patient Position: Sitting, BP Cuff Size: Adult)   Pulse 96   Ht 1.626 m (5' 4\")   Wt 76.2 kg (168 lb)   SpO2 97%   BMI 28.84 kg/m²       Physical Exam  HENT:      Head: Normocephalic and atraumatic.      Mouth/Throat:      Mouth: Mucous membranes are moist.   Cardiovascular:      Rate and Rhythm: Normal rate.   Pulmonary:      Effort: Pulmonary effort is normal. No respiratory distress.   Musculoskeletal:         General: No swelling or deformity.      Cervical back: Normal range of motion.   Skin:     General: Skin is dry.      Findings: No rash.   Neurological:      General: No focal deficit present.      Mental Status: She is alert and oriented to person, place, and time.   Psychiatric:      Comments: Normal affect                      Assessment/Plan     Problem List Items Addressed This Visit             ICD-10-CM    Vaginal dryness N89.8    Relevant Orders    Referral to " [facility]    Vitamin D deficiency E55.9     Optimize this. Very important for her depression. Rx sent and then instructed patient to start high dose vitamin D 5000 International Units daily to maintain her level. Suggest pcp repeat in 4-6 months to document improvement.          Relevant Medications    ergocalciferol (Vitamin D-2) 1.25 MG (37955 UT) capsule (Start on 1/16/2025)    Irritable bowel syndrome with constipation K58.1     Does not eat enough fiber in her diet. Discussed fruit, water, ground flax seed with oatmeal. Continue to discuss with primary care. Probably lack of fiber and high meat diet is contributing to her elevated choelsterol as well.          Pelvic pain - Primary R10.2     Chronic pain and dyspaurenia. Suggest guided imagery in addition to care with vascular surgery for venous issues. Trial of acupuncture. Treat constipation more aggressively in case this is contributing.          Relevant Orders    Referral to [facility]    Elevated cholesterol E78.00     Discussed decrease animal products and more fiber in diet- replace some of her meat cheese and eggs with beans and lentils. Eat more fruit/ oats./ vegetables.          Infertility, female N97.9    Relevant Orders    Referral to [facility]      Please take vitamin d 5000 .international units by mouth daily when you complete the high dose vitamin d that I have prescribed for four weeks .   2. Please call to have an appointment with your psychiatrist.  Discuss with them why you are not taking remeron and figure out another solution.   3. Consider seeing Dr. Pippa Lozoya for the chronic pelvic pain. '  4. Start vitamin b12 1000 mcg as a gummy by mouth every morning for low vitamin b12.   5. [facility] guided imagery Lasha Marie.   6. At the follow up. Let talk about your cholesterol. Limit amount of meat, cheese, eggs. Eat more beans.   7. Try to eat something for breakfast. Try overnight oats with ground  flax seed.   Follow up in 3 months.       Recommend Follow up in : 3 months.     Oliva Durant MD PhD    Time Spent  Prep time on day of patient encounter: 3 minutes  Time spent directly with patient, family or caregiver: 55 minutes  Additional Time Spent on Patient Care Activities: 0 minutes  Documentation Time: 5 minutes  Other Time Spent: 0 minutes  Total: 63 minutes

## 2025-01-15 NOTE — PATIENT INSTRUCTIONS
Please take vitamin d 5000 .international units by mouth daily when you complete the high dose vitamin d that I have prescribed for four weeks .   2. Please call to have an appointment with your psychiatrist.  Discuss with them why you are not taking remeron and figure out another solution.   3. Consider seeing Dr. Pippa Lozoya for the chronic pelvic pain. '  4. Start vitamin b12 1000 mcg as a gummy by mouth every morning for low vitamin b12.   5. [facility] guided imagery Lasha Marie.   6. At the follow up. Let talk about your cholesterol. Limit amount of meat, cheese, eggs. Eat more beans.   7. Try to eat something for breakfast. Try overnight oats with ground flax seed.   Follow up in 3 months.   Oliva Durant MD PhD

## 2025-01-15 NOTE — ASSESSMENT & PLAN NOTE
Chronic pain and dyspaurenia. Suggest guided imagery in addition to care with vascular surgery for venous issues. Trial of acupuncture. Treat constipation more aggressively in case this is contributing.

## 2025-01-20 ENCOUNTER — OFFICE VISIT (OUTPATIENT)
Dept: PAIN MEDICINE | Facility: HOSPITAL | Age: 35
End: 2025-01-20
Payer: MEDICARE

## 2025-01-20 DIAGNOSIS — M54.12 CERVICAL RADICULOPATHY: ICD-10-CM

## 2025-01-20 DIAGNOSIS — M48.02 NEURAL FORAMINAL STENOSIS OF CERVICAL SPINE: ICD-10-CM

## 2025-01-20 DIAGNOSIS — M50.223 OTHER CERVICAL DISC DISPLACEMENT AT C6-C7 LEVEL: Primary | ICD-10-CM

## 2025-01-20 PROCEDURE — 99203 OFFICE O/P NEW LOW 30 MIN: CPT | Performed by: PAIN MEDICINE

## 2025-01-20 PROCEDURE — 99213 OFFICE O/P EST LOW 20 MIN: CPT | Performed by: PAIN MEDICINE

## 2025-01-20 ASSESSMENT — PAIN SCALES - GENERAL: PAINLEVEL_OUTOF10: 7

## 2025-01-20 NOTE — PROGRESS NOTES
Subjective   Patient ID: Armida Adkins is a 34 y.o. female with a past medical history of cervical radicular pain       HPI:   Armida is a new patient here for evaluation of the chronic neck pain with cervical radicular symptoms.  The pain started approximately 6 months ago without inciting event.  The pain starts in the neck and radiates down into her right shoulder and often radiates down into all 5 fingertips.  The pain is particularly worse in the 2nd through 4th finger.  Pain can be worsened with positioning.  Slightly improved with physical therapy, medication management.  She was initially evaluated by orthopedic surgery, who referred to physical therapy.  She initially responded well to physical therapy, but the pain is returned.  The pain is now 6/10.  She would like to undergo procedural intervention.      Physical Therapy: The patient has done six or more weeks of physical therapy in the past six months with minimal improvement  Classes of medications using in the past: Acetaminophen, NSAIDs, Gabapentenoids, and Muscle Relaxants      Review of Systems   13-point ROS done and negative except for HPI.     Current Outpatient Medications   Medication Instructions    acetaminophen (TYLENOL) 650 mg, oral, Every 6 hours PRN    albuterol 90 mcg/actuation inhaler 1-2 puffs, inhalation, Every 4 hours PRN    albuterol 2.5 mg, Every 4 hours PRN    cyclobenzaprine (FEXMID) 7.5 mg, oral, Nightly PRN    ergocalciferol (VITAMIN D-2) 1,250 mcg, oral, 2 times weekly    estradiol (Estrace) 0.01 % (0.1 mg/gram) vaginal cream PLACE A PEA SIZED AMOUNT ON YOUR FINGER AND APPLY TO SORE AREA OF VULVA. USE NIGHTLY FOR ONE MONTH.    etonogestreL-ethinyl estradioL (Nuvaring) 0.12-0.015 mg/24 hr vaginal ring 1 each, vaginal, Every 28 days    gabapentin (NEURONTIN) 300 mg, oral, Nightly    hydrocortisone 2.5 % cream Apply twice daily to affected areas    lactulose 10 g, oral, 2 times daily PRN    mirtazapine (REMERON) 15 mg, Nightly     naproxen (NAPROSYN) 500 mg, oral, 2 times daily PRN    ondansetron ODT (ZOFRAN-ODT) 4 mg, oral, Every 8 hours PRN       Past Medical History:   Diagnosis Date    Acute sinusitis, unspecified 2016    Acute rhinosinusitis    Encounter for initial prescription of contraceptive pills 08/10/2018    Encounter for initial prescription of contraceptive pills    Encounter for removal of intrauterine contraceptive device 2016    Encounter for IUD removal    Encounter for screening for malignant neoplasm of cervix 2015    Pap smear for cervical cancer screening    Myopia, bilateral 2014    Bilateral myopia    Other conditions influencing health status      0    Other specified health status 2017    Contraception    Other specified symptoms and signs involving the digestive system and abdomen 2015    Difficulty swallowing pills    Pain in right shoulder 10/07/2020    Right shoulder pain    Pelvic and perineal pain 2021    Pelvic pain    Personal history of other diseases of the digestive system 2015    History of hemorrhoids    Personal history of other diseases of the female genital tract 08/10/2018    History of vaginal discharge    Personal history of other diseases of the female genital tract 08/10/2018    History of vulvodynia    Personal history of other diseases of the female genital tract 2015    History of metrorrhagia    Personal history of other diseases of the musculoskeletal system and connective tissue 10/13/2022    History of scoliosis    Personal history of other diseases of the respiratory system     History of asthma    Personal history of other specified conditions     H/O shortness of breath    Unspecified astigmatism, bilateral 2014    Astigmatism, bilateral        Past Surgical History:   Procedure Laterality Date    INVASIVE VASCULAR PROCEDURE N/A 4/15/2024    Procedure: VENOGRAM;  Surgeon: Marianna Ordaz MD;  [facility];   Service: Vascular Surgery;  Laterality: N/A;  R jugular approach, pelvic venogram. 3% STS sclerotherapy    IR ANGIOGRAM INFERIOR EPIGASTRIC  2/9/2023    IR ANGIOGRAM INFERIOR EPIGASTRIC 2/9/2023 [facility] CLINICAL LEGACY    IR ANGIOGRAM INFERIOR EPIGASTRIC PELVIC  2/9/2023    IR ANGIOGRAM INFERIOR EPIGASTRIC PELVIC 2/9/2023 [facility] CLINICAL LEGACY    IR ANGIOGRAM INFERIOR EPIGASTRIC PELVIC  2/9/2023    IR ANGIOGRAM INFERIOR EPIGASTRIC PELVIC 2/9/2023 [facility] CLINICAL LEGACY    IR VENOGRAM LOWER EXTREMITY Left 12/11/2023    IR VENOGRAM LOWER EXTREMITY 12/11/2023 Marianna Ordaz MD [facility] ANGIO    OTHER SURGICAL HISTORY  08/18/2020    Removal    OTHER SURGICAL HISTORY  07/31/2015    Surgery Suture Of Gum Laceration        Family History   Problem Relation Name Age of Onset    Uterine cancer Mother      Seizures Mother      Other (cerebrovascular accident) Father      Uterine cancer Sister      Colon cancer Mother's Brother          Allergies   Allergen Reactions    Benzonatate Unknown    Coconut Itching    Erythromycin-Benzoyl Peroxide Unknown    Fruit Flavor Unknown    Peach Itching    Pineapple Itching    Pollen Extracts Unknown    Sulfamethoxazole-Trimethoprim Swelling        Objective     There were no vitals filed for this visit.     Physical Exam  General: NAD, well groomed, well nourished  Eyes: Non-icteric sclera, EOMI  Ears, Nose, Mouth, and Throat: External ears and nose appear to be without deformity or rash. No lesions or masses noted. Hearing is grossly intact.   Neck: Trachea midline  Respiratory: Nonlabored breathing   Cardiovascular: no peripheral edema   Skin: No rashes or open lesions/ulcers identified on skin.    Neck:  Spurling positive on the right  Decreased range of motion rotation to the left  Mild tenderness to palpation of the paravertebral muscles    Neurologic:   Cranial nerves grossly intact.   Strength 5/5 and symmetric plantar/dorsiflexion   Sensation: Decreased sensation to light touch in the  bilateral hypothenar eminence  DTRs: Patellar reflexes normal   Bell: absent    Psychiatric: Alert, orientation to person, place, and time. Cooperative.    Imaging personally reviewed and independently interpreted:   MR cervical spine wo IV contrast 08/06/2024    Narrative  Interpreted By:  Jerry Danielson and Ebai Jerky  STUDY:  MR CERVICAL SPINE WO IV CONTRAST;  8/6/2024 1:20 pm    INDICATION:  Signs/Symptoms:cervical radiculopathy.    COMPARISON:  None.    ACCESSION NUMBER(S):  [id number]    ORDERING CLINICIAN:  SISI JENKINS    TECHNIQUE:  Sagittal T1, T2, STIR, axial T1 and axial T2 weighted images were  acquired through the cervical spine.    FINDINGS:  Alignment: The vertebral alignment is within normal limits.    Vertebrae/Intervertebral Discs: The vertebral bodies demonstrate  expected height.  The marrow signal is within normal limits. Mild  multilevel disc desiccation and disc height loss with endplate  degenerative changes and osteophytic spurring.    Cord: Normal in caliber and signal.    C1-C2: The cervicomedullary junction appears unremarkable. There is  no spinal canal stenosis.    C2-C3: There is no posterior disc contour abnormality. There is no  significant spinal canal or neural foraminal stenosis.    C3-C4: Mild disc bulge mildly effaces the ventral thecal sac. There  is no significant spinal canal or neural foraminal stenosis.    C4-C5: Mild disc bulge mildly effaces the ventral thecal sac. There  is no significant spinal canal or neural foraminal stenosis.    C5-C6: Mild disc bulge mildly effaces the ventral thecal sac. There  is no significant spinal canal or neural foraminal stenosis.    C6-C7: Disc bulge with a superimposed right paracentral/right  foraminal disc herniation. There is effacement of the  right-greater-than-left ventral CSF space. There is mild spinal canal  stenosis. There is effacement of the right lateral recess. There is  moderate right neural foraminal stenosis. Left  neural foraminal  stenosis is widely patent.    C7-T1: There is no posterior disc contour abnormality. There is no  significant spinal canal or neural foraminal stenosis.    The upper thoracic vertebrae and spinal canal are unremarkable.    The prevertebral and posterior paraspinous soft tissues are within  normal limits.    Impression  Mild multilevel degenerative changes of the cervical spine. There is  mild spinal canal stenosis at C6-C7 secondary to disc bulge with  superimposed right paracentral/foraminal disc herniation resulting in  partial effacement of the ventral CSF space. There is moderate right  C6-C7 neural foraminal stenosis.    I personally reviewed the images/study and I agree with the findings  as stated by Resident Belkis Subramanian. This study was interpreted at  [facility], [place], [place].    MACRO:  None    Signed by: Jerry Dainelson 8/6/2024 7:31 PM  Dictation workstation:   [id number]       Assessment/Plan   Armida is a 34-year-old female with clinical picture consistent with cervical radiculopathy based on presentation, cervical MRI findings, and physical exam.  She initially responded to physical therapy and medication management, but the pain has returned.  Will plan for right-sided C6-7 cervical transforaminal JUAN JOSE under ultrasound guidance.  She should continue PT directed exercises and gabapentin/Flexeril as needed.    Plan:  -Schedule right-sided C6-7 cervical transforaminal JUAN JOSE under ultrasound guidance.  Denies blood thinner use.  Procedure, risks benefits, alternate therapies discussed.  -Continue PT directed exercises  -Continue gabapentin and Flexeril as needed.    The patient has failed treatment with : Physical therapy , three or more classes of medications, have significant limitations in their sleep quality due to the pain, have significant limitations of their quality of life due to the pain, have significant impairments of their activities of daily  living (ADLs) due to the pain, and have significant impairments of their instrumental activities of daily living (IADLs) due to the pain    We discussed  the risks, benefits and alternatives of the procedure including but not limited to: , Epidural hematoma, Post-dural puncture headache, Lack of efficacy , Transiently worsening pain , Bleeding, Infection , and Nerve Damage    Follow up: As needed     The patient was invited to contact us back anytime with any questions or concerns and follow-up with us in the office as needed.     Diagnoses and all orders for this visit:  Cervical radiculopathy  -     Referral to Pain Management      This note was generated with the aid of dictation software, there may be typos despite my attempts at proofreading.    Aaron Ricketts, DO  Pain Fellow

## 2025-01-27 NOTE — H&P
Pain Management H&P    History Of Present Illness  Armida Adkins is a 34 y.o. female presents for procedure state below. Endorses no changes in past medical history or medical health since last seen in clinic.      Past Medical History  She has a past medical history of Acute sinusitis, unspecified (02/01/2016), Encounter for initial prescription of contraceptive pills (08/10/2018), Encounter for removal of intrauterine contraceptive device (03/01/2016), Encounter for screening for malignant neoplasm of cervix (11/24/2015), Myopia, bilateral (12/01/2014), Other conditions influencing health status, Other specified health status (05/23/2017), Other specified symptoms and signs involving the digestive system and abdomen (07/09/2015), Pain in right shoulder (10/07/2020), Pelvic and perineal pain (05/28/2021), Personal history of other diseases of the digestive system (07/30/2015), Personal history of other diseases of the female genital tract (08/10/2018), Personal history of other diseases of the female genital tract (08/10/2018), Personal history of other diseases of the female genital tract (09/11/2015), Personal history of other diseases of the musculoskeletal system and connective tissue (10/13/2022), Personal history of other diseases of the respiratory system, Personal history of other specified conditions, and Unspecified astigmatism, bilateral (12/01/2014).    Surgical History  She has a past surgical history that includes Other surgical history (08/18/2020); Other surgical history (07/31/2015); IR angiogram inferior epigastric pelvic (2/9/2023); IR angiogram inferior epigastric pelvic (2/9/2023); IR angiogram inferior epigastric (2/9/2023); IR venogram lower extremity (Left, 12/11/2023); and Invasive Vascular Procedure (N/A, 4/15/2024).     Social History  She reports that she has never smoked. She has been exposed to tobacco smoke. She has never used smokeless tobacco. She reports that she does not  currently use alcohol. She reports that she does not use drugs.    Family History  Family History   Problem Relation Name Age of Onset    Uterine cancer Mother      Seizures Mother      Other (cerebrovascular accident) Father      Uterine cancer Sister      Colon cancer Mother's Brother          Allergies  Benzonatate, Coconut, Erythromycin-benzoyl peroxide, Fruit flavor, Peach, Pineapple, Pollen extracts, and Sulfamethoxazole-trimethoprim    Review of Symptoms:   Constitutional: Negative for chills, diaphoresis or fever  HENT: Negative for neck swelling  Eyes:.  Negative for eye pain  Respiratory:.  Negative for cough, shortness of breath or wheezing    Cardiovascular:.  Negative for chest pain or palpitations  Gastrointestinal:.  Negative for abdominal pain, nausea and vomiting  Genitourinary:.  Negative for urgency  Musculoskeletal:  Positive for neck pain. Positive for joint pain. Denies falls within the past 3 months.  Skin: Negative for wounds or itching   Neurological: Negative for dizziness, seizures, loss of consciousness and weakness  Endo/Heme/Allergies: Does not bruise/bleed easily  Psychiatric/Behavioral: Negative for depression. The patient does not appear anxious.      Pre-sedation Evaluation  ASA class 2  Mallampati score 2     PHYSICAL EXAM  Vitals signs reviewed  Constitutional:       General: Not in acute distress     Appearance: Normal appearance. Not ill-appearing.  HENT:     Head: Normocephalic and atraumatic  Eyes:     Conjunctiva/sclera: Conjunctivae normal  Cardiovascular:     Rate and Rhythm: Normal rate and regular rhythm  Pulmonary:     Effort: No respiratory distress  Abdominal:     Palpations: Abdomen is soft  Musculoskeletal: AMAYA  Skin:     General: Skin is warm and dry  Neurological:     General: No focal deficit present  Psychiatric:         Mood and Affect: Mood normal         Behavior: Behavior normal     Last Recorded Vitals  There were no vitals taken for this visit.    Relevant  Results  Current Outpatient Medications   Medication Instructions    acetaminophen (TYLENOL) 650 mg, oral, Every 6 hours PRN    albuterol 90 mcg/actuation inhaler 1-2 puffs, inhalation, Every 4 hours PRN    albuterol 2.5 mg, Every 4 hours PRN    cyclobenzaprine (FEXMID) 7.5 mg, oral, Nightly PRN    ergocalciferol (VITAMIN D-2) 1,250 mcg, oral, 2 times weekly    estradiol (Estrace) 0.01 % (0.1 mg/gram) vaginal cream PLACE A PEA SIZED AMOUNT ON YOUR FINGER AND APPLY TO SORE AREA OF VULVA. USE NIGHTLY FOR ONE MONTH.    etonogestreL-ethinyl estradioL (Nuvaring) 0.12-0.015 mg/24 hr vaginal ring 1 each, vaginal, Every 28 days    gabapentin (NEURONTIN) 300 mg, oral, Nightly    hydrocortisone 2.5 % cream Apply twice daily to affected areas    lactulose 10 g, oral, 2 times daily PRN    mirtazapine (REMERON) 15 mg, Nightly    naproxen (NAPROSYN) 500 mg, oral, 2 times daily PRN    ondansetron ODT (ZOFRAN-ODT) 4 mg, oral, Every 8 hours PRN         MR cervical spine wo IV contrast 08/06/2024    Narrative  Interpreted By:  Jerry Danielson and Ebai Jerky  STUDY:  MR CERVICAL SPINE WO IV CONTRAST;  8/6/2024 1:20 pm    INDICATION:  Signs/Symptoms:cervical radiculopathy.    COMPARISON:  None.    ACCESSION NUMBER(S):  [id number]    ORDERING CLINICIAN:  SISI JENKINS    TECHNIQUE:  Sagittal T1, T2, STIR, axial T1 and axial T2 weighted images were  acquired through the cervical spine.    FINDINGS:  Alignment: The vertebral alignment is within normal limits.    Vertebrae/Intervertebral Discs: The vertebral bodies demonstrate  expected height.  The marrow signal is within normal limits. Mild  multilevel disc desiccation and disc height loss with endplate  degenerative changes and osteophytic spurring.    Cord: Normal in caliber and signal.    C1-C2: The cervicomedullary junction appears unremarkable. There is  no spinal canal stenosis.    C2-C3: There is no posterior disc contour abnormality. There is no  significant spinal canal or  neural foraminal stenosis.    C3-C4: Mild disc bulge mildly effaces the ventral thecal sac. There  is no significant spinal canal or neural foraminal stenosis.    C4-C5: Mild disc bulge mildly effaces the ventral thecal sac. There  is no significant spinal canal or neural foraminal stenosis.    C5-C6: Mild disc bulge mildly effaces the ventral thecal sac. There  is no significant spinal canal or neural foraminal stenosis.    C6-C7: Disc bulge with a superimposed right paracentral/right  foraminal disc herniation. There is effacement of the  right-greater-than-left ventral CSF space. There is mild spinal canal  stenosis. There is effacement of the right lateral recess. There is  moderate right neural foraminal stenosis. Left neural foraminal  stenosis is widely patent.    C7-T1: There is no posterior disc contour abnormality. There is no  significant spinal canal or neural foraminal stenosis.    The upper thoracic vertebrae and spinal canal are unremarkable.    The prevertebral and posterior paraspinous soft tissues are within  normal limits.    Impression  Mild multilevel degenerative changes of the cervical spine. There is  mild spinal canal stenosis at C6-C7 secondary to disc bulge with  superimposed right paracentral/foraminal disc herniation resulting in  partial effacement of the ventral CSF space. There is moderate right  C6-C7 neural foraminal stenosis.    I personally reviewed the images/study and I agree with the findings  as stated by Resident Belkis Subramanian. This study was interpreted at  [place], [place].    MACRO:  None    Signed by: Jerry Danielson 8/6/2024 7:31 PM  Dictation workstation:   [id number]      MR LUMBAR SPINE WO IV CONTRAST 05/09/2022    Narrative  MRN: [id number]  Patient Name: MERE HERNANDEZ    STUDY:  MRI L-SPINE WO;  5/9/2022 4:15 pm    INDICATION:  Low back pain  S39.012A: Lumbar strain, initial encounter M54.16:  Lumbar radiculopathy, acute.   Per EMR, patient is 31-year-old female  with history of scoliosis and low back pain receiving epidural  steroid injections.    COMPARISON:  Lumbosacral spine radiographs, 09/14/2021    ACCESSION NUMBER(S):  [id number]    ORDERING CLINICIAN:  ANNALEE LEE    TECHNIQUE:  Sagittal T1, T2, STIR, axial T1 and T2 weighted images of the lumbar  spine were acquired.    FINDINGS:  This report assumes 5 non-rib bearing lumbar vertebral bodies. The  lowest intervertebral disc will be labeled L5-S1.    Alignment: The vertebral alignment is maintained. There is  levoscoliosis of the lumbar spine centered at L2-3, similar to prior  radiographs.    Vertebrae/Intervertebral Discs: The vertebral bodies demonstrate  expected height.The marrow signal is within normal limits. The  intervertebral discs also demonstrate normal signal and morphology.    Conus medullaris: The lower thoracic cord appears unremarkable. The  conus terminates at L1-2 and is unremarkable in appearance.    From T12-L1 through L4-L5, there is no posterior disc contour  abnormality. There is no spinal canal stenosis or neural foraminal  narrowing. There is no facet osteoarthropathy.    At L5-S1, there is no striking posterior disc contour abnormality.  There is mild right facet osteoarthropathy. There is no spinal canal  stenosis or neural foraminal narrowing.    Impression  Levoscoliosis of the lumbar spine. Mild right facet osteoarthropathy  at L5-S1, otherwise no striking degenerative changes of the lumbar  spine.    I personally reviewed the images/study and I agree with the findings  as stated. This study was interpreted at [place], [place].     No image results found.       No diagnosis found.     ASSESSMENT/PLAN  Armida Adkins is a 34 y.o. female here for right C6/7 transforaminal epidural steroid injection under ultrasound and fluoroscopy    Patient denies any recent antibiotic use or infections, denies any  blood thinner use, and denies contrast or local anesthetic allergies     Risks, benefits, alternatives discussed. All questions answered to the best of my ability. Patient agrees to proceed.      Our plan is as follows:  - Proceed with aforementioned procedure          Elias Fuentes MD   Pain fellow

## 2025-01-28 ENCOUNTER — APPOINTMENT (OUTPATIENT)
Dept: INTEGRATIVE MEDICINE | Facility: CLINIC | Age: 35
End: 2025-01-28
Payer: COMMERCIAL

## 2025-01-28 ENCOUNTER — HOSPITAL ENCOUNTER (OUTPATIENT)
Facility: HOSPITAL | Age: 35
Discharge: HOME | End: 2025-01-28
Payer: MEDICARE

## 2025-01-28 VITALS
WEIGHT: 168 LBS | OXYGEN SATURATION: 98 % | HEART RATE: 84 BPM | RESPIRATION RATE: 18 BRPM | HEIGHT: 64 IN | DIASTOLIC BLOOD PRESSURE: 77 MMHG | TEMPERATURE: 98.2 F | SYSTOLIC BLOOD PRESSURE: 111 MMHG | BODY MASS INDEX: 28.68 KG/M2

## 2025-01-28 DIAGNOSIS — M54.12 CERVICAL RADICULOPATHY: ICD-10-CM

## 2025-01-28 PROCEDURE — 64479 NJX AA&/STRD TFRM EPI C/T 1: CPT | Performed by: PAIN MEDICINE

## 2025-01-28 PROCEDURE — 99152 MOD SED SAME PHYS/QHP 5/>YRS: CPT | Performed by: PAIN MEDICINE

## 2025-01-28 PROCEDURE — 2500000004 HC RX 250 GENERAL PHARMACY W/ HCPCS (ALT 636 FOR OP/ED): Performed by: PAIN MEDICINE

## 2025-01-28 PROCEDURE — 2550000001 HC RX 255 CONTRASTS: Performed by: PAIN MEDICINE

## 2025-01-28 RX ORDER — DEXAMETHASONE SODIUM PHOSPHATE 10 MG/ML
INJECTION INTRAMUSCULAR; INTRAVENOUS
Status: COMPLETED | OUTPATIENT
Start: 2025-01-28 | End: 2025-01-28

## 2025-01-28 RX ORDER — DEXAMETHASONE SODIUM PHOSPHATE 10 MG/ML
INJECTION INTRAMUSCULAR; INTRAVENOUS
Status: DISPENSED
Start: 2025-01-28 | End: 2025-01-28

## 2025-01-28 RX ORDER — BUPIVACAINE HYDROCHLORIDE 5 MG/ML
INJECTION, SOLUTION EPIDURAL; INTRACAUDAL
Status: COMPLETED | OUTPATIENT
Start: 2025-01-28 | End: 2025-01-28

## 2025-01-28 RX ORDER — FENTANYL CITRATE 50 UG/ML
INJECTION, SOLUTION INTRAMUSCULAR; INTRAVENOUS
Status: COMPLETED | OUTPATIENT
Start: 2025-01-28 | End: 2025-01-28

## 2025-01-28 RX ORDER — MIDAZOLAM HYDROCHLORIDE 1 MG/ML
INJECTION, SOLUTION INTRAMUSCULAR; INTRAVENOUS
Status: COMPLETED | OUTPATIENT
Start: 2025-01-28 | End: 2025-01-28

## 2025-01-28 RX ORDER — MIDAZOLAM HYDROCHLORIDE 1 MG/ML
INJECTION INTRAMUSCULAR; INTRAVENOUS
Status: DISPENSED
Start: 2025-01-28 | End: 2025-01-28

## 2025-01-28 RX ORDER — FENTANYL CITRATE 50 UG/ML
INJECTION, SOLUTION INTRAMUSCULAR; INTRAVENOUS
Status: DISPENSED
Start: 2025-01-28 | End: 2025-01-28

## 2025-01-28 RX ORDER — BUPIVACAINE HYDROCHLORIDE 5 MG/ML
INJECTION, SOLUTION EPIDURAL; INTRACAUDAL
Status: DISPENSED
Start: 2025-01-28 | End: 2025-01-28

## 2025-01-28 RX ADMIN — IOHEXOL 0.5 ML: 240 INJECTION, SOLUTION INTRATHECAL; INTRAVASCULAR; INTRAVENOUS; ORAL at 08:25

## 2025-01-28 RX ADMIN — FENTANYL CITRATE 50 MCG: 50 INJECTION INTRAMUSCULAR; INTRAVENOUS at 08:18

## 2025-01-28 RX ADMIN — BUPIVACAINE HYDROCHLORIDE 4 ML: 5 INJECTION, SOLUTION EPIDURAL; INTRACAUDAL; PERINEURAL at 08:24

## 2025-01-28 RX ADMIN — MIDAZOLAM 1 MG: 1 INJECTION INTRAMUSCULAR; INTRAVENOUS at 08:18

## 2025-01-28 RX ADMIN — DEXAMETHASONE SODIUM PHOSPHATE 10 MG: 10 INJECTION, SOLUTION INTRAMUSCULAR; INTRAVENOUS at 08:24

## 2025-01-28 ASSESSMENT — PAIN SCALES - GENERAL
PAINLEVEL_OUTOF10: 4
PAINLEVEL_OUTOF10: 6
PAINLEVEL_OUTOF10: 4

## 2025-01-28 ASSESSMENT — PAIN - FUNCTIONAL ASSESSMENT
PAIN_FUNCTIONAL_ASSESSMENT: 0-10
PAIN_FUNCTIONAL_ASSESSMENT: 0-10
PAIN_FUNCTIONAL_ASSESSMENT: WONG-BAKER FACES
PAIN_FUNCTIONAL_ASSESSMENT: 0-10
PAIN_FUNCTIONAL_ASSESSMENT: WONG-BAKER FACES

## 2025-01-28 NOTE — DISCHARGE INSTRUCTIONS
DISCHARGE INSTRUCTIONS FOR INJECTIONS     You underwent right cervical transforaminal epidural steroid injection today    After most injections, it is recommended that you relax and limit your activity for the remainder of the day unless you have been told otherwise by your pain physician.  You should not drive a car, operate machinery, or make important legal decisions unless otherwise directed by your pain physician.  You may resume your normal activity, including exercise, tomorrow.      Keep a written pain diary of how much pain relief you experienced following the injection procedure and the length of time of pain relief you experienced pain relief. Following diagnostic injections like medial branch nerve blocks, sacroiliac joint blocks, stellate ganglion injections and other blocks, it is very important you record the specific amount of pain relief you experienced immediately after the injectionand how long it lasted. Your doctor will ask you for this information at your follow up visit.     For all injections, please keep the injection site dry and inspect the site for a couple of days. You may remove the Band-Aid the day of the injection at any time.     Some discomfort, bruising or slight swelling may occur at the injection site. This is not abnormal if it occurs.  If needed you may:    -Take over the counter medication such as Tylenol or Motrin.   -Apply an ice pack for 30 minutes, 2 to 3 times a day for the first 24 hours.     You may shower today; no soaking baths, hot tubs, whirlpools or swimming pools for two days.      If you are given steroids in your injection, it may take 3-5 days for the steroid medication to take effect. You may notice a worsening of your symptoms for 1-2 days after the injection. This is not abnormal.  You may use acetaminophen, ibuprofen, or prescription medication that your doctor may have prescribed for you if you need to do so.     A few common side effects of steroids  include facial flushing, sweating, restlessness, irritability,difficulty sleeping, increase in blood sugar, and increased blood pressure. If you have diabetes, please monitor your blood sugar at least once a day for at least 5 days. If you have poorly controlled high blood pressure, monitoryour blood pressure for at least 2 days and contact your primary care physician if these numbers are unusually high for you.      If you take aspirin or non-steroidal anti-inflammatory drugs (examples are Motrin, Advil, ibuprofen, Naprosyn, Voltaren, Relafen, etc.) you may restart these this evening, but stop taking it 3 days before your next appointment, unless instructed otherwiseby your physician.      You do not need to discontinue non-aspirin-containing pain medications prior to an injection (examples: Celebrex, tramadol, hydrocodone and acetaminophen).      If you take a blood thinning medication (Coumadin, Lovenox, Fragmin,Ticlid, Plavix, Pradaxa, etc.), please discuss this with your primary care physician/cardiologist and your pain physician. These medications MUST be discontinued before you can have an injection safely, without the risk of uncontrolled bleeding. If these medications are not discontinued for an appropriate period of time, you will not be able to receivean injection. Please adhere to instructions given to you about when to restart your blood thinning medication. If you have any questions please reach out to our team.    If you are taking Coumadin, please have your INR checked the morning of your procedure and bring the result to your appointment unless otherwise instructed. If your INR is over 1.2, your injection may need to be rescheduled to avoid uncontrolled bleeding from the needle placement.     Call [facility]  and ask for Pain Management at [phone] between 8am-4pm Monday - Friday if you are experiencing the following:    If you received an epidural or spinal injection:    -Headache that doesnot  go away with medicine, is worse when sitting or standing up, and is greatly relieved upon lying down.   -Severe pain worse than or different than your baseline pain.   -Chills or fever (101º F or greater).   -Drainage or signs of infection at the injection site     Go directly to the Emergency Department if you are experiencing the following and received an epidural or spinal injection:   -Abrupt weakness or progressive weakness in your legs that starts after you leave the clinic.   -Abrupt severe or worsening numbness in your legs.   -Inability to urinate after the injection or loss of bowel or bladder control without the urge to defecate or urinate.     If you have a clinical question that cannot wait until your next appointment, please call [phone] between 8am-4pm Monday - Friday or send a [employer] message. We do our best to return all non-emergency messages within 24 hours, Monday - Friday. A nurse or physician will return your message. You may also try calling and they will do their best to answer your question(s):  - Dr. Javi Bush's nurse ([phone])  - Dr. Daisy Montana/Dr. Diego's nurse ([phone])  - Dr. Russel Alcaraz/Richelle's nurse ([phone])     If you need to cancel an appointment, please call the scheduling staff at [phone] during normal business hours or leave a message at least 24 hours in advance.     If you are going to be sedated for your next procedure, you MUST have responsible adult who can legally drive accompany you home. You cannot eat or drink for at least eight hours prior to the planned procedure if you are going to receive sedation. You may take your non-blood thinning medications with a small sip of water.

## 2025-01-30 ENCOUNTER — TELEPHONE (OUTPATIENT)
Dept: PAIN MEDICINE | Facility: HOSPITAL | Age: 35
End: 2025-01-30
Payer: MEDICARE

## 2025-01-30 NOTE — TELEPHONE ENCOUNTER
Ms. Hahn called that she is experiencing nausea since her cervical transforaminal on 25. She does have ondansetron 4mg that is not . She has been taking this medication every 8 hours as needed. She will continue trying this and stay in touch if the feeling does not diminish

## 2025-02-14 ENCOUNTER — EVALUATION (OUTPATIENT)
Dept: PHYSICAL THERAPY | Facility: CLINIC | Age: 35
End: 2025-02-14
Payer: MEDICARE

## 2025-02-14 DIAGNOSIS — M54.42 CHRONIC LEFT-SIDED LOW BACK PAIN WITH LEFT-SIDED SCIATICA: ICD-10-CM

## 2025-02-14 DIAGNOSIS — G89.29 CHRONIC LEFT-SIDED LOW BACK PAIN WITH LEFT-SIDED SCIATICA: ICD-10-CM

## 2025-02-14 PROCEDURE — 97110 THERAPEUTIC EXERCISES: CPT | Mod: GP | Performed by: PHYSICAL THERAPIST

## 2025-02-14 PROCEDURE — 97161 PT EVAL LOW COMPLEX 20 MIN: CPT | Mod: GP | Performed by: PHYSICAL THERAPIST

## 2025-02-14 ASSESSMENT — ENCOUNTER SYMPTOMS
DEPRESSION: 0
LOSS OF SENSATION IN FEET: 0
OCCASIONAL FEELINGS OF UNSTEADINESS: 0

## 2025-02-14 NOTE — LETTER
February 14, 2025    Lissa Luis, PT  [address] [facility] [address] [facility] [address]  [facility] [address]    Patient: Armida Adkins   YOB: 1990   Date of Visit: 2/14/2025       Dear Lorin Chung MD   [address]  [facility] [address]  [place] [address]    The attached plan of care is being sent to you because your patient’s medical reimbursement requires that you certify the plan of care. Your signature is required to allow uninterrupted insurance coverage.      You may indicate your approval by signing below and faxing this form back to us at Dept Fax: [phone].    Please call Dept: [phone] with any questions or concerns.    Thank you for this referral,        Lissa Luis, PT  [facility] [address] [facility]  [address] [facility] [address]    Payer: Payor: MEDICARE / Plan: MEDICARE PART A AND B / Product Type: *No Product type* /                                                                         Date:     Dear Lissa Luis, PT,     Re: Ms. Armida Adkins, MRN:[id number]    I certify that I have reviewed the attached plan of care and it is medically necessary for Ms. Armida Adkins (1990) who is under my care.          ______________________________________                    _________________  Provider name and credentials                                           Date and time                                                                                           Plan of Care 2/14/25   Effective from: 2/14/2025  Effective to: 5/10/2025    Plan ID: [id number]            Participants as of Finalize on 2/14/2025    Name Type Comments Contact Info    Lorin Chung MD PCP - General  [phone]    Lissa Luis, PT Physical Therapist  [phone]       Last Plan Note     Author: Lissa Luis PT Status: Incomplete Last edited: 2/14/2025  9:30 AM       PHYSICAL THERAPY   EVALUATION & TREATMENT NOTE    Patient Name:   Armida Murraying   Patient MRN: [id number]  Date: 2/14/2025    Time Calculation  Start Time: 0940  Stop Time: 1015  Time Calculation (min): 35 min    Insurance:  Insurance Type: Medicare  Visit number: 1  Approved # of visits [place]  Authorization Needed: No  Cert Date: 2/14/25-5/10/25    General   Reason for visit: LBP   Referred by: Lorin Chung    Therapy diagnoses:   Problem List Items Addressed This Visit             ICD-10-CM    Chronic left-sided low back pain with left-sided sciatica M54.42, G89.29    Relevant Orders    Follow Up In Physical Therapy       ASSESSMENT   35 y/o female c/o chronic LBP presenting to PT today with decreased core and B hip strength, decreased lumbar spine ROM, some decreased LE flexibility affecting her ability to perform heavy household chores, stand/walk for more than 5 minutes, sleep comfortably, and navigate community spaces, as well as other ADLs. Patient will benefit from skilled therapy to address these impairments and return to prior level of functioning.     PLAN   Goals  1. Pt will be independent in HEP in 6-8 weeks  2. Pt will report 0-4/10 low back pain at rest and with activity in 6-8 weeks.  3. Pt will demonstrate 5/5 B hip strength to return to standing > 15' without pain in 6-8 weeks  4. Pt will demonstrate full and pain free lumbar ROM to improve ability to perform all household chores in 6-8 weeks.  5. Pt will report COURTNEY score < 15% in 6-8 weeks.    Plan of care to include: therapeutic exercise, therapeutic activity, soft tissue mobilization, joint mobilizations, neuromuscular re-education, pt education, self care activities, home program    1x/week for 6-8 weeks.    Patient agrees to plan of care.    SUBJECTIVE   Reviewed medical history form with patient and medical screening assessed     Has had LBP for many years since Aug 2019, but as she's gotten older and gained weight it's gotten worse. Was diagnosed with scoliosis when she was 18 but it didn't hurt until  recently. Has had PT for her low back before with some improvement. Takes a muscle relaxer as needed, but it doesn't always help. Isn't really having much pelvic floor pain anymore, but hasn't had any intercourse to know. No loss off bowel/bladder symptoms.   MRI off lumbar spine shows levoscoliosis of lumbar spine, mild R facet osteoarthropathy L5-S1   Had cervical steroid injection on 1/28/25    Pain:  LBP radiates down B LES R>L, achey type pain. Denies N/T.   At worst, pain is 10/10  Exacerbating factors include standing>5', during her menstrual cycle, sitting too long, rain or cold  At best, pain is 0/10  Relieving factors include laying on her stomach, stretching forward to touch her toes.    Function:  Has a hard time to stand long enough to take a shower or wash dishes. Can't vacuum, mop, sweep. IND in self care  Sleep - depression meds help her sleep, but occasionally her pain does still wake her  Lives with her .     Work: Unemployed currently. Is trying to get SSRI with back pain.     Social: bobby   Exercise - none. Can't walk long distances.    Pt goals: No more LBP    Language: English  Potential barriers to treatment: continue to assess    Precautions:    PMH: pelvic pain, neck pain, leukocytosis  Fall risk -  none      OBJECTIVE *=painful  Gait Ambulates IND, non antalgic    Observation/Posture  Squat WNL    Balance  SLS R/L WNL    Palpation  (+) TTP T8-10, R mid glute     Sensation  Intact to light touch B LE    Range of Motion (R, L in degrees)  Lumbar Flexion fingers to toes   Lumbar Extension min dec*  Lumbar Sidebending min dec*, WNL  Lumbar Rotation mod dec*, WNL    Flexibility (R, L)  Hamstrings min dec, WNL  Quads min dec, min dec    Strength (R, L MMT out of 5)  Hip Flexion  5, 5  Hip Extension 3+, 4-   Hip Abduction 4, 4-  Knee Flexion 5, 5  Knee Extension 5, 5  Ankle Dorsiflexion 5, 5  Upper Abs 3+    Outcome Measures  COURTNEY = 38%    Today's treatment and initial evaluation  included:  - Patient education regarding diagnosis, prognosis, contributing factors, comorbidities, activity modification, symptom monitoring, importance of HEP, role of PT, postural re-education, body mechanics, reviewed office cancel/no show policy.  - Review of POC   - Therapeutic Exercise & given HEP handout:  Access Code: [email or web address]  - Supine Posterior Pelvic Tilt  - 2 x daily - 10 reps - 10 sec hold  - Supine Pelvic Tilt with Straight Leg Raise  - 2 sets - 10 reps  - Supine Bridge  - 2 sets - 10 reps - 5 sec hold  - Beginner Sidelying Spinal Rotation  - 1 x daily - 10 reps - 10 sec hold  - Supine Piriformis Stretch  - 1 x daily - 3 reps - 30 sec hold  PT Evaluation Time Entry  PT Evaluation (Low) Time Entry: 25  PT Therapeutic Procedures Time Entry  Therapeutic Exercise Time Entry: 10                         Current Participants as of 2/14/2025    Name Type Comments Contact Info    Lorin Chung MD PCP - General  [phone]    Signature pending    Lissa Luis, PT Physical Therapist  [phone]    Electronically signed by Lissa Luis PT at 2/14/2025 1016 EST

## 2025-02-14 NOTE — PROGRESS NOTES
PHYSICAL THERAPY   EVALUATION & TREATMENT NOTE    Patient Name:  Armida Adkins   Patient MRN: [id number]  Date: 2/14/2025    Time Calculation  Start Time: 0940  Stop Time: 1015  Time Calculation (min): 35 min    Insurance:  Insurance Type: Medicare  Visit number: 1  Approved # of visits [place]  Authorization Needed: No  Cert Date: 2/14/25-5/10/25    General   Reason for visit: LBP   Referred by: Lorin Chung    Therapy diagnoses:   Problem List Items Addressed This Visit             ICD-10-CM    Chronic left-sided low back pain with left-sided sciatica M54.42, G89.29    Relevant Orders    Follow Up In Physical Therapy       ASSESSMENT   35 y/o female c/o chronic LBP presenting to PT today with decreased core and B hip strength, decreased lumbar spine ROM, some decreased LE flexibility affecting her ability to perform heavy household chores, stand/walk for more than 5 minutes, sleep comfortably, and navigate community spaces, as well as other ADLs. Patient will benefit from skilled therapy to address these impairments and return to prior level of functioning.     PLAN   Goals  1. Pt will be independent in HEP in 6-8 weeks  2. Pt will report 0-4/10 low back pain at rest and with activity in 6-8 weeks.  3. Pt will demonstrate 5/5 B hip strength to return to standing > 15' without pain in 6-8 weeks  4. Pt will demonstrate full and pain free lumbar ROM to improve ability to perform all household chores in 6-8 weeks.  5. Pt will report COURTNEY score < 15% in 6-8 weeks.    Plan of care to include: therapeutic exercise, therapeutic activity, soft tissue mobilization, joint mobilizations, neuromuscular re-education, pt education, self care activities, home program    1x/week for 6-8 weeks.    Patient agrees to plan of care.    SUBJECTIVE   Reviewed medical history form with patient and medical screening assessed     Has had LBP for many years since Aug 2019, but as she's gotten older and gained weight it's gotten worse. Was  diagnosed with scoliosis when she was 18 but it didn't hurt until recently. Has had PT for her low back before with some improvement. Takes a muscle relaxer as needed, but it doesn't always help. Isn't really having much pelvic floor pain anymore, but hasn't had any intercourse to know. No loss off bowel/bladder symptoms.   MRI off lumbar spine shows levoscoliosis of lumbar spine, mild R facet osteoarthropathy L5-S1   Had cervical steroid injection on 1/28/25    Pain:  LBP radiates down B LES R>L, achey type pain. Denies N/T.   At worst, pain is 10/10  Exacerbating factors include standing>5', during her menstrual cycle, sitting too long, rain or cold  At best, pain is 0/10  Relieving factors include laying on her stomach, stretching forward to touch her toes.    Function:  Has a hard time to stand long enough to take a shower or wash dishes. Can't vacuum, mop, sweep. IND in self care  Sleep - depression meds help her sleep, but occasionally her pain does still wake her  Lives with her .     Work: Unemployed currently. Is trying to get SSRI with back pain.     Social: bobby   Exercise - none. Can't walk long distances.    Pt goals: No more LBP    Language: English  Potential barriers to treatment: continue to assess    Precautions:    PMH: pelvic pain, neck pain, leukocytosis  Fall risk -  none      OBJECTIVE *=painful  Gait Ambulates IND, non antalgic    Observation/Posture  Squat WNL    Balance  SLS R/L WNL    Palpation  (+) TTP T8-10, R mid glute     Sensation  Intact to light touch B LE    Range of Motion (R, L in degrees)  Lumbar Flexion fingers to toes   Lumbar Extension min dec*  Lumbar Sidebending min dec*, WNL  Lumbar Rotation mod dec*, WNL    Flexibility (R, L)  Hamstrings min dec, WNL  Quads min dec, min dec    Strength (R, L MMT out of 5)  Hip Flexion  5, 5  Hip Extension 3+, 4-   Hip Abduction 4, 4-  Knee Flexion 5, 5  Knee Extension 5, 5  Ankle Dorsiflexion 5, 5  Upper Abs 3+    Outcome  Measures  COURTNYE = 38%    Today's treatment and initial evaluation included:  - Patient education regarding diagnosis, prognosis, contributing factors, comorbidities, activity modification, symptom monitoring, importance of HEP, role of PT, postural re-education, body mechanics, reviewed office cancel/no show policy.  - Review of POC   - Therapeutic Exercise & given HEP handout:  Access Code: [email or web address]  - Supine Posterior Pelvic Tilt  - 2 x daily - 10 reps - 10 sec hold  - Supine Pelvic Tilt with Straight Leg Raise  - 2 sets - 10 reps  - Supine Bridge  - 2 sets - 10 reps - 5 sec hold  - Beginner Sidelying Spinal Rotation  - 1 x daily - 10 reps - 10 sec hold  - Supine Piriformis Stretch  - 1 x daily - 3 reps - 30 sec hold  PT Evaluation Time Entry  PT Evaluation (Low) Time Entry: 25  PT Therapeutic Procedures Time Entry  Therapeutic Exercise Time Entry: 10

## 2025-03-03 ENCOUNTER — DOCUMENTATION (OUTPATIENT)
Dept: PHYSICAL THERAPY | Facility: CLINIC | Age: 35
End: 2025-03-03
Payer: MEDICARE

## 2025-03-03 ENCOUNTER — HOSPITAL ENCOUNTER (EMERGENCY)
Facility: HOSPITAL | Age: 35
Discharge: HOME | End: 2025-03-03
Attending: EMERGENCY MEDICINE
Payer: MEDICARE

## 2025-03-03 ENCOUNTER — APPOINTMENT (OUTPATIENT)
Dept: RADIOLOGY | Facility: HOSPITAL | Age: 35
End: 2025-03-03
Payer: MEDICARE

## 2025-03-03 ENCOUNTER — APPOINTMENT (OUTPATIENT)
Dept: PHYSICAL THERAPY | Facility: CLINIC | Age: 35
End: 2025-03-03
Payer: MEDICARE

## 2025-03-03 ENCOUNTER — OFFICE VISIT (OUTPATIENT)
Dept: URGENT CARE | Age: 35
End: 2025-03-03
Payer: MEDICARE

## 2025-03-03 VITALS
WEIGHT: 170 LBS | TEMPERATURE: 98.4 F | BODY MASS INDEX: 29.18 KG/M2 | SYSTOLIC BLOOD PRESSURE: 116 MMHG | OXYGEN SATURATION: 98 % | RESPIRATION RATE: 18 BRPM | DIASTOLIC BLOOD PRESSURE: 75 MMHG | HEART RATE: 87 BPM

## 2025-03-03 VITALS
SYSTOLIC BLOOD PRESSURE: 108 MMHG | OXYGEN SATURATION: 97 % | DIASTOLIC BLOOD PRESSURE: 73 MMHG | HEART RATE: 90 BPM | TEMPERATURE: 98 F | RESPIRATION RATE: 20 BRPM

## 2025-03-03 DIAGNOSIS — R10.9 FLANK PAIN: ICD-10-CM

## 2025-03-03 DIAGNOSIS — R10.32 LEFT LOWER QUADRANT ABDOMINAL PAIN: Primary | ICD-10-CM

## 2025-03-03 DIAGNOSIS — R10.9 FLANK PAIN: Primary | ICD-10-CM

## 2025-03-03 LAB
ALBUMIN SERPL BCP-MCNC: 4.4 G/DL (ref 3.4–5)
ALP SERPL-CCNC: 116 U/L (ref 33–110)
ALT SERPL W P-5'-P-CCNC: 11 U/L (ref 7–45)
ANION GAP SERPL CALC-SCNC: 11 MMOL/L (ref 10–20)
APPEARANCE UR: ABNORMAL
AST SERPL W P-5'-P-CCNC: 14 U/L (ref 9–39)
BASOPHILS # BLD AUTO: 0.04 X10*3/UL (ref 0–0.1)
BASOPHILS NFR BLD AUTO: 0.4 %
BILIRUB SERPL-MCNC: 0.6 MG/DL (ref 0–1.2)
BILIRUB UR STRIP.AUTO-MCNC: NEGATIVE MG/DL
BUN SERPL-MCNC: 12 MG/DL (ref 6–23)
CALCIUM SERPL-MCNC: 9.6 MG/DL (ref 8.6–10.3)
CHLORIDE SERPL-SCNC: 102 MMOL/L (ref 98–107)
CO2 SERPL-SCNC: 27 MMOL/L (ref 21–32)
COLOR UR: ABNORMAL
CREAT SERPL-MCNC: 0.82 MG/DL (ref 0.5–1.05)
EGFRCR SERPLBLD CKD-EPI 2021: >90 ML/MIN/1.73M*2
EOSINOPHIL # BLD AUTO: 0.19 X10*3/UL (ref 0–0.7)
EOSINOPHIL NFR BLD AUTO: 1.7 %
ERYTHROCYTE [DISTWIDTH] IN BLOOD BY AUTOMATED COUNT: 13.2 % (ref 11.5–14.5)
GLUCOSE SERPL-MCNC: 89 MG/DL (ref 74–99)
GLUCOSE UR STRIP.AUTO-MCNC: NORMAL MG/DL
HCG UR QL IA.RAPID: NEGATIVE
HCT VFR BLD AUTO: 38 % (ref 36–46)
HGB BLD-MCNC: 12.9 G/DL (ref 12–16)
IMM GRANULOCYTES # BLD AUTO: 0.07 X10*3/UL (ref 0–0.7)
IMM GRANULOCYTES NFR BLD AUTO: 0.6 % (ref 0–0.9)
KETONES UR STRIP.AUTO-MCNC: NEGATIVE MG/DL
LEUKOCYTE ESTERASE UR QL STRIP.AUTO: NEGATIVE
LIPASE SERPL-CCNC: 14 U/L (ref 9–82)
LYMPHOCYTES # BLD AUTO: 2.57 X10*3/UL (ref 1.2–4.8)
LYMPHOCYTES NFR BLD AUTO: 23.4 %
MCH RBC QN AUTO: 26.5 PG (ref 26–34)
MCHC RBC AUTO-ENTMCNC: 33.9 G/DL (ref 32–36)
MCV RBC AUTO: 78 FL (ref 80–100)
MONOCYTES # BLD AUTO: 0.61 X10*3/UL (ref 0.1–1)
MONOCYTES NFR BLD AUTO: 5.6 %
NEUTROPHILS # BLD AUTO: 7.48 X10*3/UL (ref 1.2–7.7)
NEUTROPHILS NFR BLD AUTO: 68.3 %
NITRITE UR QL STRIP.AUTO: NEGATIVE
NRBC BLD-RTO: 0 /100 WBCS (ref 0–0)
PH UR STRIP.AUTO: 5 [PH]
PLATELET # BLD AUTO: 301 X10*3/UL (ref 150–450)
POC APPEARANCE, URINE: CLEAR
POC BILIRUBIN, URINE: NEGATIVE
POC BLOOD, URINE: NEGATIVE
POC COLOR, URINE: NORMAL
POC GLUCOSE, URINE: NEGATIVE MG/DL
POC KETONES, URINE: NEGATIVE MG/DL
POC LEUKOCYTES, URINE: NEGATIVE
POC NITRITE,URINE: NEGATIVE
POC PH, URINE: 6 PH
POC PROTEIN, URINE: NEGATIVE MG/DL
POC SPECIFIC GRAVITY, URINE: 1.01
POC UROBILINOGEN, URINE: 0.2 EU/DL
POTASSIUM SERPL-SCNC: 4.2 MMOL/L (ref 3.5–5.3)
PREGNANCY TEST URINE, POC: NEGATIVE
PROT SERPL-MCNC: 7.5 G/DL (ref 6.4–8.2)
PROT UR STRIP.AUTO-MCNC: NEGATIVE MG/DL
RBC # BLD AUTO: 4.86 X10*6/UL (ref 4–5.2)
RBC # UR STRIP.AUTO: NEGATIVE MG/DL
SODIUM SERPL-SCNC: 136 MMOL/L (ref 136–145)
SP GR UR STRIP.AUTO: 1.01
UROBILINOGEN UR STRIP.AUTO-MCNC: NORMAL MG/DL
WBC # BLD AUTO: 11 X10*3/UL (ref 4.4–11.3)

## 2025-03-03 PROCEDURE — 99204 OFFICE O/P NEW MOD 45 MIN: CPT | Performed by: NURSE PRACTITIONER

## 2025-03-03 PROCEDURE — 96376 TX/PRO/DX INJ SAME DRUG ADON: CPT

## 2025-03-03 PROCEDURE — 2500000004 HC RX 250 GENERAL PHARMACY W/ HCPCS (ALT 636 FOR OP/ED): Performed by: EMERGENCY MEDICINE

## 2025-03-03 PROCEDURE — 96375 TX/PRO/DX INJ NEW DRUG ADDON: CPT

## 2025-03-03 PROCEDURE — 81003 URINALYSIS AUTO W/O SCOPE: CPT | Performed by: EMERGENCY MEDICINE

## 2025-03-03 PROCEDURE — 96374 THER/PROPH/DIAG INJ IV PUSH: CPT

## 2025-03-03 PROCEDURE — 81025 URINE PREGNANCY TEST: CPT | Performed by: NURSE PRACTITIONER

## 2025-03-03 PROCEDURE — 74176 CT ABD & PELVIS W/O CONTRAST: CPT

## 2025-03-03 PROCEDURE — 85025 COMPLETE CBC W/AUTO DIFF WBC: CPT | Performed by: EMERGENCY MEDICINE

## 2025-03-03 PROCEDURE — 81003 URINALYSIS AUTO W/O SCOPE: CPT | Performed by: NURSE PRACTITIONER

## 2025-03-03 PROCEDURE — 80053 COMPREHEN METABOLIC PANEL: CPT | Performed by: EMERGENCY MEDICINE

## 2025-03-03 PROCEDURE — 99284 EMERGENCY DEPT VISIT MOD MDM: CPT | Mod: 25 | Performed by: EMERGENCY MEDICINE

## 2025-03-03 PROCEDURE — 36415 COLL VENOUS BLD VENIPUNCTURE: CPT | Performed by: EMERGENCY MEDICINE

## 2025-03-03 PROCEDURE — 81025 URINE PREGNANCY TEST: CPT | Performed by: EMERGENCY MEDICINE

## 2025-03-03 PROCEDURE — 74176 CT ABD & PELVIS W/O CONTRAST: CPT | Performed by: RADIOLOGY

## 2025-03-03 PROCEDURE — 83690 ASSAY OF LIPASE: CPT | Performed by: EMERGENCY MEDICINE

## 2025-03-03 RX ORDER — KETOROLAC TROMETHAMINE 30 MG/ML
15 INJECTION, SOLUTION INTRAMUSCULAR; INTRAVENOUS ONCE
Status: COMPLETED | OUTPATIENT
Start: 2025-03-03 | End: 2025-03-03

## 2025-03-03 RX ORDER — ONDANSETRON HYDROCHLORIDE 2 MG/ML
4 INJECTION, SOLUTION INTRAVENOUS ONCE
Status: COMPLETED | OUTPATIENT
Start: 2025-03-03 | End: 2025-03-03

## 2025-03-03 RX ADMIN — ONDANSETRON 4 MG: 2 INJECTION INTRAMUSCULAR; INTRAVENOUS at 13:01

## 2025-03-03 RX ADMIN — KETOROLAC TROMETHAMINE 15 MG: 30 INJECTION, SOLUTION INTRAMUSCULAR; INTRAVENOUS at 13:01

## 2025-03-03 RX ADMIN — KETOROLAC TROMETHAMINE 15 MG: 30 INJECTION, SOLUTION INTRAMUSCULAR at 16:46

## 2025-03-03 ASSESSMENT — PAIN DESCRIPTION - LOCATION
LOCATION: RIB CAGE
LOCATION: ABDOMEN

## 2025-03-03 ASSESSMENT — PAIN SCALES - GENERAL
PAINLEVEL_OUTOF10: 8
PAINLEVEL_OUTOF10: 7

## 2025-03-03 ASSESSMENT — PAIN - FUNCTIONAL ASSESSMENT: PAIN_FUNCTIONAL_ASSESSMENT: 0-10

## 2025-03-03 ASSESSMENT — PAIN DESCRIPTION - PAIN TYPE: TYPE: ACUTE PAIN

## 2025-03-03 ASSESSMENT — ENCOUNTER SYMPTOMS: FLANK PAIN: 1

## 2025-03-03 NOTE — PROGRESS NOTES
Physical Therapy                 Therapy Communication Note    Patient Name: Armida Adkins  MRN: [id number]  Department:   Room: Room/bed info not found  Today's Date: 3/3/2025     Discipline: Physical Therapy          Missed Visit Reason:  ER    Missed Time: Cancel

## 2025-03-03 NOTE — PROGRESS NOTES
Subjective   Patient ID: Armida Adkins is a 34 y.o. female. They present today with a chief complaint of Flank Pain (Pt advised that she has had left sided flank pain for the past 12 hours. ).    History of Present Illness  Reports that pain started randomly yesterday and persisted all night making it difficult to sleep on left side.  Denies injury.  Denies urinary symptoms.  Denies fever, chills, constipation, diarrhea, vomiting, weakness.      Flank Pain      Past Medical History  Allergies as of 2025 - Reviewed 2025   Allergen Reaction Noted    Benzonatate Unknown 2023    Coconut Itching 2024    Erythromycin-benzoyl peroxide Unknown 2023    Fruit flavor Unknown 2023    Peach Itching 2024    Pineapple Itching 2024    Pollen extracts Unknown 2023    Sulfamethoxazole-trimethoprim Swelling 2023       (Not in a hospital admission)       Past Medical History:   Diagnosis Date    Acute sinusitis, unspecified 2016    Acute rhinosinusitis    Encounter for initial prescription of contraceptive pills 08/10/2018    Encounter for initial prescription of contraceptive pills    Encounter for removal of intrauterine contraceptive device 2016    Encounter for IUD removal    Encounter for screening for malignant neoplasm of cervix 2015    Pap smear for cervical cancer screening    Myopia, bilateral 2014    Bilateral myopia    Other conditions influencing health status      0    Other specified health status 2017    Contraception    Other specified symptoms and signs involving the digestive system and abdomen 2015    Difficulty swallowing pills    Pain in right shoulder 10/07/2020    Right shoulder pain    Pelvic and perineal pain 2021    Pelvic pain    Personal history of other diseases of the digestive system 2015    History of hemorrhoids    Personal history of other diseases of the female genital tract  08/10/2018    History of vaginal discharge    Personal history of other diseases of the female genital tract 08/10/2018    History of vulvodynia    Personal history of other diseases of the female genital tract 09/11/2015    History of metrorrhagia    Personal history of other diseases of the musculoskeletal system and connective tissue 10/13/2022    History of scoliosis    Personal history of other diseases of the respiratory system     History of asthma    Personal history of other specified conditions     H/O shortness of breath    Unspecified astigmatism, bilateral 12/01/2014    Astigmatism, bilateral       Past Surgical History:   Procedure Laterality Date    INVASIVE VASCULAR PROCEDURE N/A 4/15/2024    Procedure: VENOGRAM;  Surgeon: Marianna Ordaz MD;  Location: [facility];  Service: Vascular Surgery;  Laterality: N/A;  R jugular approach, pelvic venogram. 3% STS sclerotherapy    IR ANGIOGRAM INFERIOR EPIGASTRIC  2/9/2023    IR ANGIOGRAM INFERIOR EPIGASTRIC 2/9/2023 [facility] CL[facility]ICAL [facility]GAC[facility]    IR ANGIOGRAM INFERIOR EPIGASTRIC PELVIC  2/9/2023    IR ANGIOGRAM INFERIOR EPIGASTRIC PELVIC 2/9/2023 [facility] CLINICAL [facility]GAC[facility]    IR ANGIOGRAM INFERIOR EPIGASTRIC PELVIC  2/9/2023    IR ANGIOGRAM INFERIOR EPIGASTRIC PELVIC 2/9/2023 [facility] CLINICAL LEGAC[facility]    IR VENOGRAM LOWER EXTREMITY Left 12/11/2023    IR VENOGRAM LOWER EXTREMITY 12/11/2023 Marianna Ordaz MD [facility] ANGIO    OTHER SURGICAL HISTORY  08/18/2020    Removal    OTHER SURGICAL HISTORY  07/31/2015    Surgery Suture Of Gum Laceration        reports that she has never smoked. She has been exposed to tobacco smoke. She has never used smokeless tobacco. She reports that she does not currently use alcohol. She reports that she does not use drugs.    Review of Systems  Review of Systems   Genitourinary:  Positive for flank pain.                                  Objective    Vitals:    03/03/25 0929   BP: 108/73   Pulse: 90   Resp: 20   Temp: 36.7 °C (98 °F)   SpO2: 97%     No  LMP recorded.    Physical Exam  Constitutional:       Comments: Uncomfortable appearing   Cardiovascular:      Rate and Rhythm: Normal rate and regular rhythm.      Pulses: Normal pulses.      Heart sounds: Normal heart sounds.   Pulmonary:      Effort: Pulmonary effort is normal.      Breath sounds: Normal breath sounds.   Abdominal:      General: A surgical scar is present. Bowel sounds are normal. There is no distension or abdominal bruit. There are no signs of injury.      Palpations: There is pulsatile mass. There is no shifting dullness, fluid wave, hepatomegaly, splenomegaly or mass.      Tenderness: There is abdominal tenderness in the left lower quadrant. There is left CVA tenderness and guarding. There is no right CVA tenderness or rebound.      Hernia: No hernia is present.   Musculoskeletal:         General: Normal range of motion.   Skin:     General: Skin is warm and dry.   Neurological:      General: No focal deficit present.      Mental Status: She is alert.   Psychiatric:         Mood and Affect: Mood normal.         Behavior: Behavior normal.         Thought Content: Thought content normal.         Judgment: Judgment normal.         Procedures    Point of Care Test & Imaging Results from this visit  Results for orders placed or performed in visit on 03/03/25   POCT UA Automated manually resulted   Result Value Ref Range    POC Color, Urine Light-Yellow Straw, Yellow, Light-Yellow    POC Appearance, Urine Clear Clear    POC Glucose, Urine NEGATIVE NEGATIVE mg/dl    POC Bilirubin, Urine NEGATIVE NEGATIVE    POC Ketones, Urine NEGATIVE NEGATIVE mg/dl    POC Specific Gravity, Urine 1.015 1.005 - 1.035    POC Blood, Urine NEGATIVE NEGATIVE    POC PH, Urine 6.0 No Reference Range Established PH    POC Protein, Urine NEGATIVE NEGATIVE mg/dl    POC Urobilinogen, Urine 0.2 0.2, 1.0 EU/DL    Poc Nitrite, Urine NEGATIVE NEGATIVE    POC Leukocytes, Urine NEGATIVE NEGATIVE   POCT pregnancy, urine manually  resulted   Result Value Ref Range    Preg Test, Ur Negative Negative      No results found.    Diagnostic study results (if any) were reviewed by BRANDON De La Paz.    Assessment/Plan   Allergies, medications, history, and pertinent labs/EKGs/Imaging reviewed by BRANDON De La Paz.     Medical Decision Making  Patient with signs and symptoms consistent with LLQ abdominal/flank pain  that cannot be properly assessed in office and is stable. Patient referred to ED for further  evaluation and testing.   Dr Wilkerson notified    Orders and Diagnoses  Diagnoses and all orders for this visit:  Left lower quadrant abdominal pain  Flank pain  -     POCT UA Automated manually resulted  -     POCT pregnancy, urine manually resulted      Medical Admin Record      Patient disposition: ED    Electronically signed by BRANDON De La Paz  9:54 AM

## 2025-03-03 NOTE — PATIENT INSTRUCTIONS
Patient with signs and symptoms consistent with LLQ abdominal/flank pain  that cannot be properly assessed in office and is stable. Patient referred to ED for further  evaluation and testing.   Dr Wilkerson notified

## 2025-03-03 NOTE — ED PROVIDER NOTES
Limitations to History: None  Additional History Obtained from: None    HPI:    Patient is presenting to the emergency department due to left-sided flank pain that started suddenly. She states it has been waxing and waning without any specific exacerbating remitting factors.  She states it is left-sided and nonradiating.  Denied similar symptoms previously.  Has had associated nausea but no vomiting.  She denies any changes in her bowel movements.  She notes an increase in frequency of urination but denies any pain.  Has had previous laparoscopic surgeries for endometriosis but denies any other abdominal surgeries.  Denies any sick contacts.  Her review of systems is otherwise negative. Patient was seen at urgent care earlier today, review of the patient's chart showed confirmed history as above.    ------------------------------------------------------------------------------------------------------------------------------------------  Physical Exam:    ED Triage Vitals [03/03/25 1119]   Temperature Heart Rate Respirations BP   36.9 °C (98.4 °F) 85 18 113/75      Pulse Ox Temp Source Heart Rate Source Patient Position   98 % Oral Monitor --      BP Location FiO2 (%)     -- --        VS: As documented in the triage note and EMR flowsheet from this visit were reviewed.  General: Well appearing. No acute distress.   Eyes: Pupils round and reactive. No scleral icterus. No conjunctival injection  HENT: Atraumatic. Normocephalic. Moist mucous membranes. Trachea midline  CV: RRR, No MRG. No pedal edema appreciated.  Resp: Clear to auscultation bilaterally. Non-labored.    GI: soft, +l sided cva ttp and llq ttp without guarding rebound or rigidity; no flank rashes/lesions noted; no distention  Skin: Warm, dry, intact. No systemic rashes or lesions appreciated.  Extremities: No deformities or pain out of proportion; pulses intact   Neuro: Alert. No focal motor or sensory deficits observed. Speech fluent. Answers questions  appropriately.   Psych: Appropriate. [place].    ------------------------------------------------------------------------------------------------------------------------------------------    Medical Decision Making  Patient is presenting to the emergency department due to left-sided flank pain.  On exam the patient is awake and alert, well-appearing.  She is hemodynamically stable.  She was seen evaluated at urgent care and was referred to the ED for evaluation.  On exam the patient has reproducible left-sided tenderness to palpation.  Concern for possible kidney stone versus pyelonephritis versus urinary infection versus pancreatitis versus diverticulitis versus other intra-abdominal process.  Will plan labs, pain and nausea control with medications as well as CT imaging to evaluate for the above.    External Records Reviewed: I reviewed recent and relevant outside records including: HIE/Community Record  Escalation of Care: Appropriate for Discharge per ED course/MDM  Social Determinants Affecting Care:Not applicable  Prescription Drug Consideration: per orders  Diagnostic testing considered: per orders  Discussion of Management with Other Providers: I discussed the patient/results with: na    Objective Data  I have independently interpreted the following labs, imaging studies and MDM added to ED Course  Labs Reviewed   CBC WITH AUTO DIFFERENTIAL - Abnormal       Result Value    WBC 11.0      nRBC 0.0      RBC 4.86      Hemoglobin 12.9      Hematocrit 38.0      MCV 78 (*)     MCH 26.5      MCHC 33.9      RDW 13.2      Platelets 301      Neutrophils % 68.3      Immature Granulocytes %, Automated 0.6      Lymphocytes % 23.4      Monocytes % 5.6      Eosinophils % 1.7      Basophils % 0.4      Neutrophils Absolute 7.48      Immature Granulocytes Absolute, Automated 0.07      Lymphocytes Absolute 2.57      Monocytes Absolute 0.61      Eosinophils Absolute 0.19      Basophils Absolute 0.04     COMPREHENSIVE METABOLIC  PANEL - Abnormal    Glucose 89      Sodium 136      Potassium 4.2      Chloride 102      Bicarbonate 27      Anion Gap 11      Urea Nitrogen 12      Creatinine 0.82      eGFR >90      Calcium 9.6      Albumin 4.4      Alkaline Phosphatase 116 (*)     Total Protein 7.5      AST 14      Bilirubin, Total 0.6      ALT 11     URINALYSIS WITH REFLEX CULTURE AND MICROSCOPIC - Abnormal    Color, Urine Light-Yellow      Appearance, Urine Turbid (*)     Specific Gravity, Urine 1.013      pH, Urine 5.0      Protein, Urine NEGATIVE      Glucose, Urine Normal      Blood, Urine NEGATIVE      Ketones, Urine NEGATIVE      Bilirubin, Urine NEGATIVE      Urobilinogen, Urine Normal      Nitrite, Urine NEGATIVE      Leukocyte Esterase, Urine NEGATIVE     HCG, URINE, QUALITATIVE - Normal    HCG, Urine NEGATIVE     LIPASE - Normal    Lipase 14      Narrative:     Venipuncture immediately after or during the administration of Metamizole may lead to falsely low results. Testing should be performed immediately prior to Metamizole dosing.   URINALYSIS WITH REFLEX CULTURE AND MICROSCOPIC    Narrative:     The following orders were created for panel order Urinalysis with Reflex Culture and Microscopic.  Procedure                               Abnormality         Status                     ---------                               -----------         ------                     Urinalysis with Reflex C...[[id number]]  Abnormal            Final result               Extra Urine Gray Tube[[id number]]                                                         Please view results for these tests on the individual orders.   EXTRA URINE GRAY TUBE       CT abdomen pelvis wo IV contrast   Final Result   1.  No acute abdominal or pelvic findings. No obstructive uropathy or   ureter stone.             MACRO:   None        Signed by: Joseph Schoenberger 3/3/2025 3:30 PM   Dictation workstation:   [id number]          ED Course  ED Course as of 03/03/25 2206   Mon  Mar 03, 2025   1327 HCG negative; cbc unremarkable [LP]   1628 Patient reevaluated updated with lab and imaging findings thus far.  Patient states that the medication that she received previously was helping but notes some slight increase in her pain.  Discussed patient's workup being overall benign.  Discussed need for outpatient follow-up, patient was given return precautions for change or worsening of her symptoms, development of fever or intolerable pain.  Patient expressed understanding, will give additional dose of Toradol and discharge. [LP]      ED Course User Index  [LP] Kelsea Lazar DO         Diagnoses as of 03/03/25 2206   Flank pain       Procedure  Procedures    Disposition: [place]    Kelsea Lazar DO  Emergency Medicine  Medical Toxicology     Kelsea Lazar DO  03/03/25 2206

## 2025-03-03 NOTE — ED TRIAGE NOTES
Pt here with c/o l flank pain started yesterday and getting progressivly worse pt was sent here from  this am

## 2025-03-04 ENCOUNTER — OFFICE VISIT (OUTPATIENT)
Dept: OBSTETRICS AND GYNECOLOGY | Facility: CLINIC | Age: 35
End: 2025-03-04
Payer: MEDICARE

## 2025-03-04 VITALS
HEIGHT: 63 IN | BODY MASS INDEX: 29.8 KG/M2 | WEIGHT: 168.2 LBS | SYSTOLIC BLOOD PRESSURE: 113 MMHG | HEART RATE: 86 BPM | DIASTOLIC BLOOD PRESSURE: 74 MMHG

## 2025-03-04 DIAGNOSIS — M79.18 MYALGIA, OTHER SITE: ICD-10-CM

## 2025-03-04 DIAGNOSIS — R10.2 PELVIC PAIN: ICD-10-CM

## 2025-03-04 DIAGNOSIS — N94.0 MITTELSCHMERZ PHENOMENON: Primary | ICD-10-CM

## 2025-03-04 PROCEDURE — 3008F BODY MASS INDEX DOCD: CPT | Performed by: STUDENT IN AN ORGANIZED HEALTH CARE EDUCATION/TRAINING PROGRAM

## 2025-03-04 PROCEDURE — 99214 OFFICE O/P EST MOD 30 MIN: CPT | Performed by: STUDENT IN AN ORGANIZED HEALTH CARE EDUCATION/TRAINING PROGRAM

## 2025-03-04 RX ORDER — KETOROLAC TROMETHAMINE 10 MG/1
10 TABLET, FILM COATED ORAL EVERY 6 HOURS PRN
Qty: 20 TABLET | Refills: 3 | Status: SHIPPED | OUTPATIENT
Start: 2025-03-04 | End: 2025-03-09

## 2025-03-04 ASSESSMENT — ENCOUNTER SYMPTOMS
PSYCHIATRIC NEGATIVE: 0
MUSCULOSKELETAL NEGATIVE: 0
CONSTITUTIONAL NEGATIVE: 0
ALLERGIC/IMMUNOLOGIC NEGATIVE: 0
CARDIOVASCULAR NEGATIVE: 0
GASTROINTESTINAL NEGATIVE: 0
HEMATOLOGIC/LYMPHATIC NEGATIVE: 0
NEUROLOGICAL NEGATIVE: 0
ENDOCRINE NEGATIVE: 0
EYES NEGATIVE: 0
RESPIRATORY NEGATIVE: 0

## 2025-03-04 ASSESSMENT — PAIN SCALES - GENERAL: PAINLEVEL_OUTOF10: 0-NO PAIN

## 2025-03-04 NOTE — PROGRESS NOTES
"Division of Minimally Invasive Gynecologic Surgery  [facility]    Date: 3/4/2025 - Gynecology Visit    CC: Pelvic pain     Armida Adkins is a 34 y.o. G0 who presents in follow up for pelvic pain. Status post TPI w/ lidocaine on 7/30/2024. She is s/p foam sclerotherapy and coil embolization of bilateral hypogastric veins w/ Dr. Ordaz on 4/15/2024.     She did have some improvement with last TPI. Pain is overall a little better, but still present. Daily about a 5/10. Still has pain with intercourse. Pain is predominantly in her left pelvis/left flank and intense and sharp.     Recent pain flare that took her to urgent care in the same area. CT abd/pelvis unremarkable, CBC/CMP unremarkable, UPT negative (3/3/2025).     Stopped NuvaRing in December as she is trying for pregnancy. LMP 2/11/25. Periods are no longer painful.     She did see PFPT and went for a couple of months. A little bit of improvement with this.     She has tried:  - Vaginal Valium: moderate relief   - TPI (w/ lidocaine):   - Depo shot: tolerated well  - Progesterone IUD: tolerated well   - Robotic excision of endo w/ negative pathology   - Pain Med referral (Richelle): started on duloxetine  - GI referral: IBS-C treated w/ Linzess     Last pap: 2023 negative/negative   PMHx: pilonidal cyst (s/p surgical management), depression, a. Flutter s/p albation, asthma  PSHx: pilonidal cyst, robotic excision of endometriosis (pathology negative for endo), laparoscopic myomectomy    PMHx, PSHx, SHx, Allergies, and Medications updated in Epic.    ROS: reviewed and negative    PE: /74   Pulse 86   Ht 1.6 m (5' 3\")   Wt 76.3 kg (168 lb 3.2 oz)   LMP 02/11/2025   BMI 29.80 kg/m²    Constitutional:  No acute distress, well-nourished and well-developed  HEENT: EOM grossly intact, MMM, neck supple and with full ROM  Pulm:  Effort normal. No accessory muscle usage.  No respiratory distress.  Neurological:  She is alert and " oriented to person place and time.  Skin: Warm, no pallor.  Psychiatric:  She has normal mood and affect.    A/P: Armida Adkins is a 34 y.o. G0 who presents in follow up for pelvic pain. Status post TPI w/ lidocaine on 7/30/2024. She is s/p foam sclerotherapy and coil embolization of bilateral hypogastric veins w/ Dr. Ordaz on 4/15/2024.   - Suspect recent pain flare related to ovulation given acute onset w/o significant imaging or lab findings. Time course (roughly 2 weeks prior to menses) consistent as well. Reports Toradol at urgent care was very effective. Rx sent for PO Toradol x 5 days. UPT negative yesterday. Discussed negative impacts of Toradol in pregnancy and avoidance of naproxen/ibuprofen when taking Toradol.   - Given positive response to PFPT and pelvic floor TPI with overall suboptimal duration of improvement, recommend pelvic floor Botox injection. She is amenable to this, would prefer this done in the OR w/ IV sedation. Risks/benefits reviewed.     Plan:  - Suspect Mittelschmerz pain. Rx for Toradol sent  - Pelvic floor Botox injection, any indicated procedure. PAT: No. Any location.     Kelsea Stanton MD  Division of Minimally Invasive Gynecologic Surgery  [facility]

## 2025-03-10 ENCOUNTER — APPOINTMENT (OUTPATIENT)
Dept: PHYSICAL THERAPY | Facility: CLINIC | Age: 35
End: 2025-03-10
Payer: MEDICARE

## 2025-03-11 ENCOUNTER — TREATMENT (OUTPATIENT)
Dept: PHYSICAL THERAPY | Facility: CLINIC | Age: 35
End: 2025-03-11
Payer: MEDICARE

## 2025-03-11 DIAGNOSIS — G89.29 CHRONIC LEFT-SIDED LOW BACK PAIN WITH LEFT-SIDED SCIATICA: ICD-10-CM

## 2025-03-11 DIAGNOSIS — M54.42 CHRONIC LEFT-SIDED LOW BACK PAIN WITH LEFT-SIDED SCIATICA: ICD-10-CM

## 2025-03-11 PROCEDURE — 97110 THERAPEUTIC EXERCISES: CPT | Mod: GP | Performed by: PHYSICAL THERAPIST

## 2025-03-11 NOTE — PROGRESS NOTES
"PHYSICAL THERAPY   TREATMENT NOTE    Patient Name:  Armida Adkins   Patient MRN: [id number]  Date: 3/11/2025    Time Calculation  Start Time: 1128  Stop Time: 1207  Time Calculation (min): 39 min    Insurance:  Insurance Type: Medicare  Visit number: 2    Approved # of visits [place]  Authorization Needed: No  Cert Date: 2/14/25-5/10/25     General   Reason for visit: LBP   Referred by: Lorin Teague diagnoses:   Problem List Items Addressed This Visit             ICD-10-CM    Chronic left-sided low back pain with left-sided sciatica M54.42, G89.29       Assessment:  Encouraged pt to be more compliant with HEP. Needs cues within HEP performance to perform exercises correctly. Challenged with proper core engagement and fatigues quickly. Fair to poor tolerance of quadruped positioning.     Plan: Continue with core strength, hip strength, stretching as tolerated.      Subjective  Was in the ER for flank pain, gave her IV pain meds and then discharged her.   - Pain (0-10): 8/10 low back pain     Precautions  PMH: pelvic pain, neck pain, leukocytosis  Fall Risk: none    Objective  Access Code: [email or web address] (PPT, PPT with SLR, bridge, open book sidelying stretch, supine piriformis stretch)  Treatment Performed:   Therapeutic Exercise:    - R Stepper L5 x 10'  - Review HEP:     - PPT x 10\" x 10      - TA SLR x 10 R/L     - bridges x 5\" x 10      - open book S/L stretch x 8 R/L     - supine piriformis stretch x 30\" R/L  - clamshells x 10 R/L  - quadruped alternating hip ext x 10          PT Therapeutic Procedures Time Entry  Therapeutic Exercise Time Entry: 39                  "

## 2025-03-17 ENCOUNTER — APPOINTMENT (OUTPATIENT)
Dept: PHYSICAL THERAPY | Facility: CLINIC | Age: 35
End: 2025-03-17
Payer: MEDICARE

## 2025-03-17 ENCOUNTER — TRANSCRIBE ORDERS (OUTPATIENT)
Dept: ORTHOPEDIC SURGERY | Facility: HOSPITAL | Age: 35
End: 2025-03-17
Payer: MEDICARE

## 2025-03-17 DIAGNOSIS — M54.12 CERVICAL RADICULOPATHY: ICD-10-CM

## 2025-03-18 ENCOUNTER — OFFICE VISIT (OUTPATIENT)
Dept: ORTHOPEDIC SURGERY | Facility: CLINIC | Age: 35
End: 2025-03-18
Payer: MEDICARE

## 2025-03-18 ENCOUNTER — HOSPITAL ENCOUNTER (OUTPATIENT)
Dept: RADIOLOGY | Facility: CLINIC | Age: 35
Discharge: HOME | End: 2025-03-18
Payer: MEDICARE

## 2025-03-18 DIAGNOSIS — M54.12 CERVICAL RADICULOPATHY: Primary | ICD-10-CM

## 2025-03-18 DIAGNOSIS — M50.20 HERNIATED DISC, CERVICAL: ICD-10-CM

## 2025-03-18 DIAGNOSIS — M25.511 RIGHT SHOULDER PAIN, UNSPECIFIED CHRONICITY: ICD-10-CM

## 2025-03-18 DIAGNOSIS — M67.911 TENDINOPATHY OF RIGHT SHOULDER: ICD-10-CM

## 2025-03-18 DIAGNOSIS — M54.12 CERVICAL RADICULOPATHY: ICD-10-CM

## 2025-03-18 PROCEDURE — 99213 OFFICE O/P EST LOW 20 MIN: CPT

## 2025-03-18 PROCEDURE — 72050 X-RAY EXAM NECK SPINE 4/5VWS: CPT

## 2025-03-18 PROCEDURE — G2211 COMPLEX E/M VISIT ADD ON: HCPCS

## 2025-03-18 PROCEDURE — 1036F TOBACCO NON-USER: CPT

## 2025-03-18 ASSESSMENT — PAIN SCALES - GENERAL: PAINLEVEL_OUTOF10: 8

## 2025-03-18 ASSESSMENT — PAIN - FUNCTIONAL ASSESSMENT: PAIN_FUNCTIONAL_ASSESSMENT: 0-10

## 2025-03-18 ASSESSMENT — PAIN DESCRIPTION - DESCRIPTORS: DESCRIPTORS: DISCOMFORT

## 2025-03-18 NOTE — PROGRESS NOTES
HPI:  Armida Adkins is a 34-year-old female who presents today with a few month history of bilateral upper extremity pain/numbness and tingling.  She recently had an injection in her neck which helped for a few hours.  She states the numbness and tingling going to her fourth and fifth digits in both hands.  Occasionally has pain in the posterior right arm, down the tricep, and into the forearm.  She has done physical therapy for this which helped somewhat.  She takes gabapentin for this.    She does states she gets debilitating pain in her right shoulder.  This is worse when laying on her stomach.  She also has some muscle spasms into the right trapezius.  This is her main concerns with time.    At this point, her symptoms are becoming intolerable and she is considering surgical intervention.    ROS:  Reviewed on EMR and patient intake sheet.    PMH/SH:  Reviewed on EMR and patient intake sheet.    Exam:  MSK: Full strength of bilateral upper extremity flexion and extension of the arms.  Right shoulder examination demonstrates about 120 degrees of shoulder flexion.  Positive pain to Neer's, empty can, Coulter test.  No pain to internal or external rotation against resistance.  Positive pain to palpation of anterior shoulder.  General: No acute distress. Awake and conversant.  Eyes: Normal conjunctiva, anicteric. Round symmetric pupils.  ENT: Hearing grossly intact. No nasal discharge.  Neck: Neck is supple. No masses or thyromegaly.  Respiratory: Respirations are non-labored. No wheezing.  Skin: Warm. No rashes or ulcers.  Psych: Alert and oriented. Cooperative, appropriate mood and affect, normal judgement.  CV: No lower extremity edema.  Neuro: Sensation and CN II-XII grossly normal.    Radiology:     X-rays of cervical spine personally reviewed and demonstrates no acute fractures or dislocations.  Alignment within normal limits.  No spondylolisthesis.  Mild osteophytosis throughout posterior vertebral  bodies.    Diagnosis:    Cervical radiculopathy  Cervical disc herniation    Assessment and Plan:  Patient was seen today and evaluated for follow-up appointment regarding cervical disc herniation and cervical radiculopathy symptoms.  She had also has been having right shoulder pain over the past few months.  At this time, we reviewed her MRI, discussed her symptoms, and I recommended a shoulder evaluation and potential injections before scheduling surgery in order to rule in/out shoulder pathology due to the amount of pain she is having with range of motion and overhead movements.  She should follow-up with our team if her symptoms are not improved after seeing the shoulder provider.  At that point, we will have a discussion for surgical intervention.  Patient feels all questions were answered today.  Patient agrees to the plan above.    **This note was dictated using speech recognition software and was not corrected for spelling or grammatical errors**    Jay Huerta PA-C  Department of Orthopaedic Surgery  10:51 AM  03/18/25    [address] [place] [address]    Voicemail: [phone]   Appts: [phone]  Fax: [phone]

## 2025-03-19 ENCOUNTER — APPOINTMENT (OUTPATIENT)
Dept: PRIMARY CARE | Facility: CLINIC | Age: 35
End: 2025-03-19
Payer: MEDICARE

## 2025-03-19 DIAGNOSIS — R10.2 PELVIC PAIN: Primary | ICD-10-CM

## 2025-03-19 PROCEDURE — 99213 OFFICE O/P EST LOW 20 MIN: CPT | Performed by: INTERNAL MEDICINE

## 2025-03-19 PROCEDURE — G2211 COMPLEX E/M VISIT ADD ON: HCPCS | Performed by: INTERNAL MEDICINE

## 2025-03-19 ASSESSMENT — ENCOUNTER SYMPTOMS
DIFFICULTY URINATING: 0
NUMBNESS: 0
DIZZINESS: 0
EYE DISCHARGE: 0
WOUND: 0
NAUSEA: 0
FLANK PAIN: 0
VOMITING: 0
ABDOMINAL PAIN: 0
SHORTNESS OF BREATH: 0
FEVER: 0
CONFUSION: 0
WHEEZING: 0
BACK PAIN: 1
DIARRHEA: 0
MYALGIAS: 0
DYSURIA: 0
COUGH: 0
CHILLS: 0

## 2025-03-19 NOTE — PROGRESS NOTES
I reviewed the resident/fellow's documentation and discussed the patient with the resident/fellow. I agree with the resident/fellow's medical decision making as documented in the note.  As the attending physician,  I reviewed the relevant imaging studies and available reports. I also discussed the differential diagnosis and all of the proposed management plans with the resident.  An interactive audio and video telecommunication system which permits real time communications between the patient (at the originating site) and provider (at the distant site) was utilized to provide this telehealth service.    Verbal consent was requested and obtained from the patient on the day of encounter.  This is a virtual visit using HIPAA compliant video platform. It required patient-provider interaction for the medical decision making as documented.           Lorin Chung MD

## 2025-03-19 NOTE — PROGRESS NOTES
Subjective   Subjective:     History Of Present Illness:  Armida Adkins is a 34 y.o. female with a PMH of asthma, depression, hx pelvic congestion syndrome s/p embolization, hx chronic pelvic pain, and endometriosis, hx AVNRT s/p ablation 2023, anemia, GERD, migraine disorder who presents for a virtual visit.  Today, patient complains of left flank/back pain that initially started 2-3 weeks ago for which she went to the emergency room for further evaluation.  The workup at that time was unremarkable and she followed up with OB/GYN specialist for pelvic pain or Mittelschmerz pain and who prescribed Toradol which she has been taking with some relief of her pain.  This could be related to her ovulation cycles noted by OB/GYN specialist given acute onset without significant imaging or lab finding.  Her other associated symptoms include nausea without vomiting.  She denies abnormal vaginal discharge or vaginal bleeding.  Denies fever, chills, difficulties breathing, or chest pain.    Past Medical History:  She has a past medical history of Acute sinusitis, unspecified (02/01/2016), Encounter for initial prescription of contraceptive pills (08/10/2018), Encounter for removal of intrauterine contraceptive device (03/01/2016), Encounter for screening for malignant neoplasm of cervix (11/24/2015), Myopia, bilateral (12/01/2014), Other conditions influencing health status, Other specified health status (05/23/2017), Other specified symptoms and signs involving the digestive system and abdomen (07/09/2015), Pain in right shoulder (10/07/2020), Pelvic and perineal pain (05/28/2021), Personal history of other diseases of the digestive system (07/30/2015), Personal history of other diseases of the female genital tract (08/10/2018), Personal history of other diseases of the female genital tract (08/10/2018), Personal history of other diseases of the female genital tract (09/11/2015), Personal history of other diseases of the  musculoskeletal system and connective tissue (10/13/2022), Personal history of other diseases of the respiratory system, Personal history of other specified conditions, and Unspecified astigmatism, bilateral (12/01/2014).    Past Surgical History:  She has a past surgical history that includes Other surgical history (08/18/2020); Other surgical history (07/31/2015); IR angiogram inferior epigastric pelvic (2/9/2023); IR angiogram inferior epigastric pelvic (2/9/2023); IR angiogram inferior epigastric (2/9/2023); IR venogram lower extremity (Left, 12/11/2023); and Invasive Vascular Procedure (N/A, 4/15/2024).     Social History:  She reports that she has never smoked. She has been exposed to tobacco smoke. She has never used smokeless tobacco. She reports that she does not currently use alcohol. She reports that she does not use drugs.    Family History:  Family History   Problem Relation Name Age of Onset    Uterine cancer Mother      Seizures Mother      Other (cerebrovascular accident) Father      Uterine cancer Sister      Colon cancer Mother's Brother       Allergies:  Benzonatate, Coconut, Erythromycin-benzoyl peroxide, Fruit flavor, Peach, Pineapple, Pollen extracts, and Sulfamethoxazole-trimethoprim    Home Medications:  (Not in a hospital admission)    Review Of Systems:  11-point ROS was performed and is negative except as noted below and in the HPI.     Review of Systems   Constitutional:  Negative for chills and fever.   Eyes:  Negative for discharge.   Respiratory:  Negative for cough, shortness of breath and wheezing.    Cardiovascular:  Negative for chest pain and leg swelling.   Gastrointestinal:  Negative for abdominal pain, diarrhea, nausea and vomiting.   Genitourinary:  Negative for difficulty urinating, dysuria and flank pain.   Musculoskeletal:  Positive for back pain. Negative for myalgias.   Skin:  Negative for wound.   Neurological:  Negative for dizziness and numbness.    Psychiatric/Behavioral:  Negative for confusion.         Objective   Objective:     LMP 02/11/2025     Physical Exam  Constitutional:       General: She is not in acute distress.     Appearance: Normal appearance.   HENT:      Head: Normocephalic and atraumatic.      Nose: Nose normal.   Eyes:      Conjunctiva/sclera: Conjunctivae normal.   Pulmonary:      Effort: No respiratory distress.   Skin:     Coloration: Skin is not jaundiced.   Neurological:      General: No focal deficit present.      Mental Status: She is alert. Mental status is at baseline.          Assessment & Plan:     Assessment/Plan     Problem List Items Addressed This Visit       Pelvic pain - Primary     -Associated with left abd/flank/back pain  -Tried Toradol from her ob/gyn with some relief  -Recommended patient to reach out to her OB/GYN specialist for further assessment of her pelvic pain and other treatment options  -She is currently scheduled for Botox injection into the pelvic floor in June          #Health Maintenance:  - Routine labs in 6 months.    Revisit Topics: left abd pain.     Dispo: Patient is scheduled to return to clinic on as needed basis.    Vernon Fuentes, PGY-3  Internal Medicine     Disclaimer: Documentation completed with the information available at the time of input. The times in the chart may not be reflective of actual patient care times, interventions, or procedures. Documentation occurs after the physical care of the patient.

## 2025-03-19 NOTE — ASSESSMENT & PLAN NOTE
-Associated with left abd/flank/back pain  -Tried Toradol from her ob/gyn with some relief  -Recommended patient to reach out to her OB/GYN specialist for further assessment of her pelvic pain and other treatment options  -She is currently scheduled for Botox injection into the pelvic floor in [place]

## 2025-03-24 ENCOUNTER — TREATMENT (OUTPATIENT)
Dept: PHYSICAL THERAPY | Facility: CLINIC | Age: 35
End: 2025-03-24
Payer: MEDICARE

## 2025-03-24 DIAGNOSIS — G89.29 CHRONIC LEFT-SIDED LOW BACK PAIN WITH LEFT-SIDED SCIATICA: ICD-10-CM

## 2025-03-24 DIAGNOSIS — M54.42 CHRONIC LEFT-SIDED LOW BACK PAIN WITH LEFT-SIDED SCIATICA: ICD-10-CM

## 2025-03-24 PROCEDURE — 97110 THERAPEUTIC EXERCISES: CPT | Mod: GP | Performed by: PHYSICAL THERAPIST

## 2025-03-24 NOTE — PROGRESS NOTES
PHYSICAL THERAPY   TREATMENT NOTE    Patient Name:  Armida Adkins   Patient MRN: [id number]  Date: 3/24/2025    Time Calculation  Start Time: 1150  Stop Time: 1235  Time Calculation (min): 45 min    Insurance:  Insurance Type: Medicare  Visit number: 3    Approved # of visits [place]  Authorization Needed: No  Cert Date: 2/14/25-5/10/25     General   Reason for visit: LBP   Referred by: Lorin Teague diagnoses:   Problem List Items Addressed This Visit             ICD-10-CM       Musculoskeletal and Injuries    Chronic left-sided low back pain with left-sided sciatica M54.42, G89.29         Assessment: Improved compliance with HEP and decreased LBP. Presents with new thoracic pain with R erector spinae restriction near T5-8 upon palpation with pain during thoracic flexion. Requires tactile cues for appropriate TA activation and form during exercises. Added thoracic stretching and isometric rows to HEP to address new upper back pain. Educated on using heat and HEP for pain management.     Plan: Continue with core strength, hip strength, trunk and hip stretching. Progress with TA and start initiating standing hip strengthening exercises. Manual therapy prn.       Subjective  Pt reports new upper back/ thoracic pain that randomly started last night. Pt suspects pain from turning head quickly because it makes it worse. Pt denies increased UE numbness/ tingling. Pt able to do HEP 2x/day and lower back feeling better. Sensitive to the touch when boyfriend massaged mid-R thoracic area.   - Pain (0-10): 5/10 upper back pain, 0/10 LBP    Precautions  PMH: pelvic pain, neck pain, leukocytosis  Fall Risk: none    Objective  Palpation: R erector spinae restriction and + TTP around T5-T8    Access Code: [email or web address] (PPT, PPT with SLR, bridge, open book sidelying stretch, supine piriformis stretch, isometric rows, and thoracic ext/ flex stretch)  Treatment Performed:   Therapeutic Exercise:    - R Stepper L5 x 6'  -  "seated flex/ ext thoracic stretch 10\" x 10   - seated B low row w/ red 10\" x 5  - TA isometric 10\" x 5  - TA marches x 20  - TA 90/90 marches x 10  - TA hip ER alternating fall out x 10  - TA unilateral hip flex isometric 5\" x 8 R/L  - bridges w/hip add x 5  - bridges x 5  - clamshells x 10 w/red band R/L      Patient was seen under the guidance of a licensed physical therapist who made all clinical decisions.   Priscilla Bowen, SPT       PT Therapeutic Procedures Time Entry  Therapeutic Exercise Time Entry: 45                  "

## 2025-03-27 ENCOUNTER — APPOINTMENT (OUTPATIENT)
Dept: ALLERGY | Facility: CLINIC | Age: 35
End: 2025-03-27
Payer: COMMERCIAL

## 2025-03-27 ENCOUNTER — HOSPITAL ENCOUNTER (OUTPATIENT)
Dept: RADIOLOGY | Facility: EXTERNAL LOCATION | Age: 35
Discharge: HOME | End: 2025-03-27

## 2025-03-27 ENCOUNTER — OFFICE VISIT (OUTPATIENT)
Dept: ORTHOPEDIC SURGERY | Facility: HOSPITAL | Age: 35
End: 2025-03-27
Payer: MEDICARE

## 2025-03-27 DIAGNOSIS — M75.41 SUBACROMIAL IMPINGEMENT OF RIGHT SHOULDER: ICD-10-CM

## 2025-03-27 DIAGNOSIS — M25.511 RIGHT SHOULDER PAIN, UNSPECIFIED CHRONICITY: ICD-10-CM

## 2025-03-27 PROCEDURE — 99204 OFFICE O/P NEW MOD 45 MIN: CPT | Performed by: FAMILY MEDICINE

## 2025-03-27 PROCEDURE — 99214 OFFICE O/P EST MOD 30 MIN: CPT | Mod: 25 | Performed by: FAMILY MEDICINE

## 2025-03-27 PROCEDURE — 2500000004 HC RX 250 GENERAL PHARMACY W/ HCPCS (ALT 636 FOR OP/ED): Performed by: FAMILY MEDICINE

## 2025-03-27 PROCEDURE — 20611 DRAIN/INJ JOINT/BURSA W/US: CPT | Mod: RT | Performed by: FAMILY MEDICINE

## 2025-03-27 RX ORDER — TRIAMCINOLONE ACETONIDE 40 MG/ML
40 INJECTION, SUSPENSION INTRA-ARTICULAR; INTRAMUSCULAR
Status: COMPLETED | OUTPATIENT
Start: 2025-03-27 | End: 2025-03-27

## 2025-03-27 RX ORDER — LIDOCAINE HYDROCHLORIDE 10 MG/ML
3 INJECTION, SOLUTION INFILTRATION; PERINEURAL
Status: COMPLETED | OUTPATIENT
Start: 2025-03-27 | End: 2025-03-27

## 2025-03-27 RX ADMIN — LIDOCAINE HYDROCHLORIDE 3 ML: 10 INJECTION, SOLUTION INFILTRATION; PERINEURAL at 14:00

## 2025-03-27 RX ADMIN — TRIAMCINOLONE ACETONIDE 40 MG: 400 INJECTION, SUSPENSION INTRA-ARTICULAR; INTRAMUSCULAR at 14:00

## 2025-03-27 NOTE — PROGRESS NOTES
"** Please excuse any errors in grammar or translation related to this dictation. Voice recognition software was utilized to prepare this document. **    Assessment & Plan:  Dx: Subacromial impingement of right shoulder    We discussed that Armida's clinical presentation and exam today are suggestive of subacromial impingement of the right shoulder as a source of her pain. Discussed with patient that this can occur in the setting of rotator cuff disease. Rotator cuff disease is a spectrum of disorders ranging from rotator cuff tendinosis to full-thickness rotator cuff tears. Discuss with patient that many people develop this issue from routine use \"wear and tear\", and that impingement can also occur in the setting of anatomical variations. Treatment of this condition is targeted at reducing pain in order to maintain function. Today, we discussed and agreed to proceed with a diagnostic ultrasound guided subacromial cortisone injection of the right shoulder, which she tolerated well. We discussed that her pain may also be nerve-related in nature given her known cervical disc disease. We reviewed that if she does not get improvement from her subacromial injection, she should follow-up with Jay Huerta PA-C to discuss further treatment / next steps. If this injection does give her relief, it can be repeated as often as every 3 months; other options such as PRP could also be considered.  All questions answered and patient agrees to plan.    Chief complaint:  Right Shoulder Pain    HPI:  35 y/o RHD female patient presenting with right shoulder pain. She reports that she has been having right shoulder pain for around 10 years. The pain is located to the top and posterior aspect of her shoulder. She has also been dealing with ongoing right sided neck pain, as well as pain going down her right arm. She describes a severe aching pain that is intermittent in nature, as well as a tightness to her muscles around the shoulder. " Pain is exacerbated by heavy lifting or turning the wrong way at times. It occasionally affects sleep. She is currently taking Gabapentin 300 mg in the evening, which does not help. She has seen Dr. Mueller for her shoulder back on 5/10/2024 and did not have relief from a subacromial injection at that visit, although she mentions today that at that time she was having more arm pain than shoulder pain. She had an MRI in June 2024 that showed only a mild strain of the infraspinatus. She has been following with Jay Huerta for cervical disc herniation and cervical radiculopathy. She saw pain management a couple of months ago and received an injection in her neck that gave her a couple of weeks of pain relief.    Patient Active Problem List   Diagnosis    Abdominal pain    Abnormal uterine bleeding (AUB)    Allergic reaction    Asthma, intermittent (HHS-HCC)    Bilateral sensorineural hearing loss    Borderline hyperglycemia    Carpal tunnel syndrome, left    Carpal tunnel syndrome, bilateral upper limbs    Carpal tunnel syndrome, right    Constipation    Depression    Environmental and seasonal allergies    Female pelvic pain    Frequent nosebleeds    Hypovitaminosis D    Infertility male    Left flank pain    Leukocytosis    Low libido    Lumbar strain, initial encounter    Nausea in adult    Neuropathy    Pilonidal cyst with abscess    Pilonidal cyst    Scoliosis    Seasonal allergies    Sensation of feeling cold    Sinus headache    Subjective tinnitus of right ear    Tinnitus, right ear    TMJ dysfunction    Trigger point of shoulder region    Vaginal discharge    Vaginal dryness    Vitamin D deficiency    Tongue biting    Pelvic congestion syndrome    Nutcracker phenomenon of renal vein    Neck muscle spasm    Chronic left-sided low back pain with left-sided sciatica    Irritable bowel syndrome with constipation    Cervical strain    Bloody taste in mouth    Acute pharyngitis    Acute sinusitis    Chronic  sinusitis    Deviated nasal septum    GERD (gastroesophageal reflux disease)    Vaginal atrophy    Pelvic pain    Painful veins    History of general anesthesia    Migraine headache    Abnormal vaginal bleeding    Contact with and (suspected) exposure to covid-19    Cough, unspecified    Difficulty swallowing pills    Disorder of soft tissue    Endometriosis    History of anemia    Metrorrhagia    Pain in wrist    Pain, unspecified    Postoperative pain    Postoperative state    Presence of intrauterine contraceptive device    Swelling of lower leg    Tachycardia    Vulvodynia    Cervical radiculitis    Myalgia, other site    Hypokalemia    Viral URI with cough    Snoring    Elevated cholesterol    Infertility, female     Past Surgical History:   Procedure Laterality Date    INVASIVE VASCULAR PROCEDURE N/A 4/15/2024    Procedure: VENOGRAM;  Surgeon: Marianna Ordaz MD;  Location: [facility];  Service: Vascular Surgery;  Laterality: N/A;  R jugular approach, pelvic venogram. 3% STS sclerotherapy    IR ANGIOGRAM INFERIOR EPIGASTRIC  2/9/2023    IR ANGIOGRAM INFERIOR EPIGASTRIC 2/9/2023 [facility] CLINICAL [facility]GAC[facility]    IR ANGIOGRAM INFERIOR EPIGASTRIC PELVIC  2/9/2023    IR ANGIOGRAM INFERIOR EPIGASTRIC PELVIC 2/9/2023 [facility] CLINICAL LEGAC[facility]    IR ANGIOGRAM INFERIOR EPIGASTRIC PELVIC  2/9/2023    IR ANGIOGRAM INFERIOR EPIGASTRIC PELVIC 2/9/2023 [facility] CLINICAL LEGACY    IR VENOGRAM LOWER EXTREMITY Left 12/11/2023    IR VENOGRAM LOWER EXTREMITY 12/11/2023 Marianna Ordaz MD [facility] ANGIO    OTHER SURGICAL HISTORY  08/18/2020    Removal    OTHER SURGICAL HISTORY  07/31/2015    Surgery Suture Of Gum Laceration     Current Outpatient Medications on File Prior to Visit   Medication Sig Dispense Refill    acetaminophen (Tylenol) 325 mg tablet Take 2 tablets (650 mg) by mouth every 6 hours if needed for mild pain (1 - 3). 30 tablet 0    albuterol 2.5 mg /3 mL (0.083 %) nebulizer solution Inhale 3 mL (2.5 mg) every 4 hours if needed for  wheezing.      albuterol 90 mcg/actuation inhaler Inhale 1-2 puffs every 4 hours if needed for shortness of breath. 18 g 3    ergocalciferol (Vitamin D-2) 1.25 MG (61259 UT) capsule Take 1 capsule (1,250 mcg) by mouth 2 times a week. 8 capsule 0    gabapentin (Neurontin) 300 mg capsule Take 1 capsule (300 mg) by mouth once daily at bedtime. 30 capsule 2    hydrocortisone 2.5 % cream Apply twice daily to affected areas      mirtazapine (Remeron) 15 mg tablet Take 1 tablet (15 mg) by mouth once daily at bedtime.      ondansetron ODT (Zofran-ODT) 4 mg disintegrating tablet Take 1 tablet (4 mg) by mouth every 8 hours if needed for nausea or vomiting. 20 tablet 0     No current facility-administered medications on file prior to visit.     Exam:  General Exam:  Constitutional - NAD, AAO x 3, conversing appropriately.  HEENT- Normocephalic and atraumatic. No facial deformities. Hearing grossly normal.  Lungs - Breathing non-labored with normal rate. No accessory muscle use.  CV - Extremities warm and well-perfused, brisk capillary refill present.   Neuro - CN II-XII grossly intact.    Right Shoulder Exam:  No swelling, warmth or ecchymosis of shoulder present.   AROM: Flexion 180deg; Abduction 180deg; IR to 90, ER to 90  No TTP over clavicle, AC joint, subacromial space, posterior GHJ line, LHB tendon, deltoid, trapezius  5/5 strength in ER, IR  4/5 strength in abduction, flexion   SILT in all dermatomal distributions C5-T1    LABRUM: [-] O´sugar´s, [-] Crank test, [-] Jerk test  INSTABILITY: [-] Apprehension , [-] Relocation, [-] load and shift  IMPINGEMENT:  [+] Hawkin´s, [+] Neer´s, [+] painful arc  ROTATOR CUFF: [+] Limb drop, [-] lag signs, [+] Empty Can (SS), [-] Belly press (SSc), [+] Hornblower (IS/TM)  BICEPS: [-] Speed´s, [-] Yergason´s  AC JOINT: [-] Scarf test    Results:  Xrays of the right shoulder obtained 5/10/2024 personally interpreted as no fracture, dislocation, significant degenerative changes, or  other osseous abnormalities.    MRI R shoulder 6/24/24 reviewed which demonstrates mild strain at the myotendinous junction of the infraspinatus muscle.  No rotator cuff tears.  No articular cartilage loss.    Lab Results   Component Value Date    HGBA1C 4.9 12/18/2024    CREATININE 0.82 03/03/2025    EGFR >90 03/03/2025     Procedure:  Patient ID: Armida Adkins is a 34 y.o. female.    L Inj/Asp: R subacromial bursa on 3/27/2025 2:00 PM  Indications: pain  Details: 25 G needle, ultrasound-guided lateral approach  Medications: 40 mg triamcinolone acetonide 40 mg/mL; 3 mL lidocaine 10 mg/mL (1 %)  Outcome: tolerated well, no immediate complications    Procedure risk factors to include increased pain, bleeding, infection, neurovascular injury, soft tissue injury, progressive cartilage loss, transient elevation of blood glucose and blood pressure, and adverse reaction to medication were discussed with the patient. Patient understands there is a moderate risk of morbidity from undergoing the procedure.    Procedure, treatment alternatives, risks and benefits explained, specific risks discussed. Consent was given by the patient. Immediately prior to procedure a time out was called to verify the correct patient, procedure, equipment, support staff and site/side marked as required. Patient was prepped and draped in the usual sterile fashion.

## 2025-04-07 ENCOUNTER — DOCUMENTATION (OUTPATIENT)
Dept: PHYSICAL THERAPY | Facility: CLINIC | Age: 35
End: 2025-04-07
Payer: MEDICARE

## 2025-04-07 NOTE — PROGRESS NOTES
Physical Therapy                 Therapy Communication Note    Patient Name: Armida Adkins  MRN: [id number]  Department:   Room: Room/bed info not found  Today's Date: 4/7/2025     Discipline: Physical Therapy      Missed Time: No Show    Comment: No showed for PT appointment today. Has no further visits scheduled at this time

## 2025-04-09 ENCOUNTER — APPOINTMENT (OUTPATIENT)
Dept: PRIMARY CARE | Facility: CLINIC | Age: 35
End: 2025-04-09
Payer: COMMERCIAL

## 2025-04-09 VITALS
SYSTOLIC BLOOD PRESSURE: 115 MMHG | WEIGHT: 165 LBS | HEART RATE: 88 BPM | HEIGHT: 63 IN | BODY MASS INDEX: 29.23 KG/M2 | DIASTOLIC BLOOD PRESSURE: 77 MMHG

## 2025-04-09 DIAGNOSIS — K21.9 GASTROESOPHAGEAL REFLUX DISEASE WITHOUT ESOPHAGITIS: ICD-10-CM

## 2025-04-09 DIAGNOSIS — G89.29 CHRONIC LEFT-SIDED LOW BACK PAIN WITH LEFT-SIDED SCIATICA: Primary | ICD-10-CM

## 2025-04-09 DIAGNOSIS — J45.20 MILD INTERMITTENT ASTHMA WITHOUT COMPLICATION (HHS-HCC): ICD-10-CM

## 2025-04-09 DIAGNOSIS — N94.89 PELVIC CONGESTION SYNDROME: ICD-10-CM

## 2025-04-09 DIAGNOSIS — M54.42 CHRONIC LEFT-SIDED LOW BACK PAIN WITH LEFT-SIDED SCIATICA: Primary | ICD-10-CM

## 2025-04-09 DIAGNOSIS — I47.19 AVNRT (AV NODAL RE-ENTRY TACHYCARDIA): ICD-10-CM

## 2025-04-09 DIAGNOSIS — I83.93 SPIDER VEINS OF BOTH LOWER EXTREMITIES: ICD-10-CM

## 2025-04-09 PROCEDURE — 3008F BODY MASS INDEX DOCD: CPT | Performed by: INTERNAL MEDICINE

## 2025-04-09 PROCEDURE — 99214 OFFICE O/P EST MOD 30 MIN: CPT | Performed by: INTERNAL MEDICINE

## 2025-04-09 PROCEDURE — 1036F TOBACCO NON-USER: CPT | Performed by: INTERNAL MEDICINE

## 2025-04-09 RX ORDER — FAMOTIDINE 20 MG/1
20 TABLET, FILM COATED ORAL 2 TIMES DAILY PRN
Qty: 60 TABLET | Refills: 5 | Status: SHIPPED | OUTPATIENT
Start: 2025-04-09 | End: 2025-10-06

## 2025-04-09 RX ORDER — BUDESONIDE AND FORMOTEROL FUMARATE DIHYDRATE 80; 4.5 UG/1; UG/1
2 AEROSOL RESPIRATORY (INHALATION)
Qty: 51 G | Refills: 3 | Status: SHIPPED | OUTPATIENT
Start: 2025-04-09 | End: 2026-04-09

## 2025-04-09 RX ORDER — CYCLOBENZAPRINE HCL 10 MG
10 TABLET ORAL 3 TIMES DAILY PRN
Qty: 30 TABLET | Refills: 1 | Status: SHIPPED | OUTPATIENT
Start: 2025-04-09 | End: 2025-06-08

## 2025-04-09 RX ORDER — HYDROXYZINE PAMOATE 25 MG/1
1 CAPSULE ORAL EVERY 8 HOURS PRN
COMMUNITY
Start: 2025-03-24

## 2025-04-09 RX ORDER — ALBUTEROL SULFATE 0.83 MG/ML
2.5 SOLUTION RESPIRATORY (INHALATION) EVERY 4 HOURS PRN
Qty: 75 ML | Refills: 5 | Status: SHIPPED | OUTPATIENT
Start: 2025-04-09

## 2025-04-09 ASSESSMENT — ENCOUNTER SYMPTOMS
ABDOMINAL PAIN: 0
PALPITATIONS: 1

## 2025-04-09 ASSESSMENT — LIFESTYLE VARIABLES
SKIP TO QUESTIONS 9-10: 1
HOW OFTEN DO YOU HAVE A DRINK CONTAINING ALCOHOL: NEVER
HOW MANY STANDARD DRINKS CONTAINING ALCOHOL DO YOU HAVE ON A TYPICAL DAY: PATIENT DOES NOT DRINK
HOW OFTEN DO YOU HAVE SIX OR MORE DRINKS ON ONE OCCASION: NEVER
AUDIT-C TOTAL SCORE: 0

## 2025-04-09 NOTE — PROGRESS NOTES
"Subjective   Patient ID: Armida Adkins is a 34 y.o. female who presents for Follow-up.    34f with asthma, depression, hx pelvic congestion syndrome s/p embolization, hx chronic pelvic pain and endometriosis, hx AVNRT s/p ablation 2023, hx afib w rvr now off Eliquis, hx vitamin d deficiency, anemia, GERD, migraine disorder who presents for a follow up visit.   Pt had a few concerns this visit.   She has had intermittent sharp pain in legs chronically, sharp pain lateral knees. 1/2025 she noticed \"veins in her legs\" which are medial as shown on a phone. No itching or pain to the veins.   Sees a vascular doctor due to hx ovarian congestion syndrome, hx 3 procedures for it, last in 2023. Sees them again in May. No family hx of varicose veins.   Pt has chronic LUI, feels short distance winded when jogging a short distance. Uses albuterol uses 2x a month prn.   Has heartburn, and gets intermittent CP sometimes on exertion and sitting. Has intermittent palpitations more on exertion, though it calms down at rest. She is interested in seeing cardiology again.    Started multivitamin 1/2025 after low vit d result 12/2024.            Review of Systems   Cardiovascular:  Positive for chest pain and palpitations. Negative for leg swelling.   Gastrointestinal:  Negative for abdominal pain.       Objective   /77 (BP Location: Right arm, Patient Position: Sitting, BP Cuff Size: Adult)   Pulse 88   Ht 1.6 m (5' 3\")   Wt 74.8 kg (165 lb)   BMI 29.23 kg/m²     Physical Exam  Vitals reviewed.   Constitutional:       General: She is not in acute distress.  HENT:      Head: Normocephalic and atraumatic.   Eyes:      Extraocular Movements: Extraocular movements intact.      Conjunctiva/sclera: Conjunctivae normal.   Cardiovascular:      Rate and Rhythm: Normal rate and regular rhythm.      Heart sounds: No murmur heard.     No friction rub. No gallop.   Pulmonary:      Effort: Pulmonary effort is normal.      Breath sounds: " Normal breath sounds. No wheezing, rhonchi or rales.   Abdominal:      General: Bowel sounds are normal.      Palpations: Abdomen is soft. There is no mass.      Tenderness: There is no abdominal tenderness. There is no guarding or rebound.   Musculoskeletal:         General: No tenderness.      Cervical back: Neck supple.      Right lower leg: No edema.      Left lower leg: No edema.      Comments: Spider like veins near medial knees b/l    Skin:     General: Skin is warm and dry.      Findings: No bruising or rash.   Neurological:      Mental Status: She is alert. Mental status is at baseline.      Comments: Answering questions, following commands. Moving all extremities.    Psychiatric:         Mood and Affect: Mood and affect normal.         Assessment/Plan   Problem List Items Addressed This Visit       Asthma, intermittent ([facility])     Will try inhaled steroid to improve chronic LUI.            Relevant Medications    albuterol 2.5 mg /3 mL (0.083 %) nebulizer solution    budesonide-formoterol (Symbicort) 80-4.5 mcg/actuation inhaler    Pelvic congestion syndrome    Relevant Orders    Referral to Vascular Surgery    Chronic left-sided low back pain with left-sided sciatica - Primary    Relevant Medications    cyclobenzaprine (Flexeril) 10 mg tablet    AVNRT (AV kat re-entry tachycardia)     Heart sounds regular this visit  Pt interested in seeing cardiology again for hx of arrhythmia and intermittent chest pain.            Relevant Orders    Referral to Cardiology    GERD (gastroesophageal reflux disease)    Relevant Medications    famotidine (Pepcid) 20 mg tablet    Spider veins of both lower extremities     Pt recommended to see vascular surgery for these vessels near medial knees. Unclear if related to prior pelvic congestion syndrome.   She can choose to call for another vascular surgeon to see if she can get in sooner.            Relevant Orders    Referral to Vascular Surgery       [facility]  Maintenance  Colonoscopy - last 10/2024, next when age 47.   PAP - last 7/2023, negative cytology and hpv. Follows with gyn   Labs: none this visit   Immunizations: flu shot can get this season, tetanus vaccine none found, can get

## 2025-04-09 NOTE — ASSESSMENT & PLAN NOTE
Heart sounds regular this visit  Pt interested in seeing cardiology again for hx of arrhythmia and intermittent chest pain.

## 2025-04-14 ENCOUNTER — APPOINTMENT (OUTPATIENT)
Dept: INTEGRATIVE MEDICINE | Facility: CLINIC | Age: 35
End: 2025-04-14
Payer: MEDICARE

## 2025-04-14 DIAGNOSIS — R10.2 PELVIC PAIN: Primary | ICD-10-CM

## 2025-04-14 DIAGNOSIS — K59.09 CHRONIC CONSTIPATION: ICD-10-CM

## 2025-04-14 PROCEDURE — 99214 OFFICE O/P EST MOD 30 MIN: CPT | Performed by: INTERNAL MEDICINE

## 2025-04-14 PROCEDURE — G2211 COMPLEX E/M VISIT ADD ON: HCPCS | Performed by: INTERNAL MEDICINE

## 2025-04-14 RX ORDER — POLYETHYLENE GLYCOL 3350 17 G/17G
17 POWDER, FOR SOLUTION ORAL DAILY
Qty: 30 PACKET | Refills: 2 | Status: SHIPPED | OUTPATIENT
Start: 2025-04-14 | End: 2025-07-13

## 2025-04-14 ASSESSMENT — ENCOUNTER SYMPTOMS
FATIGUE: 1
CONSTIPATION: 1

## 2025-04-14 NOTE — PATIENT INSTRUCTIONS
Try overnight oats recipe with ground flax seed  Start miralax in 8 ounces of water or juice.   After you see cardiology, see if you are allowed to start walking.   Try acupuncture. Eat something before your appointment.  Keep working on therapy.   6. Keep taking the vitamin b12 and the vitamin d.   7., increase water to 80 ounces per day.     Follow up in 3 months.   Oliva Durant MD PhD

## 2025-04-14 NOTE — PROGRESS NOTES
Integrative Medicine Follow-up Visit :     Subjective   Patient ID: Armida Adkins is a 34 y.o. female who presents for Pelvic Pain       Pelvic Pain  The patient's primary symptoms include pelvic pain. Associated symptoms include constipation.     Seeing a different heart doctor for palpitations- referred by pcp.     No one call her to schedule acupuncture even though she states that she called for an appointment.    Constipation still a problme. She only has bowel movements easily ana maria she has her period. Sometimes goes a whole week without a bowel movement.   Started vitamin b12 daily and thinks her energy is a bit better.   Pain:still daily pelvic pain.     Her daytime sleepiness is a little better. Her psychiatrist decreased the remeron to 7.5 mg and this has helped.  Gets out of breath quickly.   Exercise: not doing any exercise. Gets winded after 2-3 minutes. She does have asthma. Can't even walk to the grocery store which is 7 minutes away.     Review of Systems   Constitutional:  Positive for fatigue.   Gastrointestinal:  Positive for constipation.   Genitourinary:  Positive for pelvic pain.                  Objective   There were no vitals taken for this visit.    Physical Exam  Constitutional:       General: She is not in acute distress.     Appearance: She is not ill-appearing, toxic-appearing or diaphoretic.   Pulmonary:      Effort: Pulmonary effort is normal. No respiratory distress.   Musculoskeletal:         General: No deformity.      Cervical back: Normal range of motion.      Comments: Upper extremities normal range of motion grossly   Skin:     Coloration: Skin is not jaundiced or pale.      Findings: No rash.   Psychiatric:         Mood and Affect: Mood normal.         Behavior: Behavior normal.                      Assessment/Plan     Problem List Items Addressed This Visit             ICD-10-CM    Pelvic pain - Primary R10.2    Relevant Medications    polyethylene glycol (Glycolax, Miralax)  17 gram packet     Other Visit Diagnoses         Codes    Chronic constipation     K59.09    Relevant Medications    polyethylene glycol (Glycolax, Miralax) 17 gram packet          Try overnight oats recipe with ground flax seed  Start miralax in 8 ounces of water or juice.   After you see cardiology, see if you are allowed to start walking.   Try acupuncture. Eat something before your appointment.  Keep working on therapy.   6. Keep taking the vitamin b12 and the vitamin d.   7., increase water to 80 ounces per day.     Recommend Follow up in : 3 months.     Oliva Durant MD PhD    Time Spent  Prep time on day of patient encounter: 3 minutes  Time spent directly with patient, family or caregiver: 30 minutes  Additional Time Spent on Patient Care Activities: 0 minutes  Documentation Time: 3 minutes  Other Time Spent: 0 minutes  Total: 36 minutes

## 2025-04-23 ENCOUNTER — APPOINTMENT (OUTPATIENT)
Dept: VASCULAR SURGERY | Facility: CLINIC | Age: 35
End: 2025-04-23
Payer: MEDICARE

## 2025-04-23 VITALS
HEART RATE: 83 BPM | DIASTOLIC BLOOD PRESSURE: 73 MMHG | BODY MASS INDEX: 28.77 KG/M2 | RESPIRATION RATE: 16 BRPM | SYSTOLIC BLOOD PRESSURE: 103 MMHG | WEIGHT: 168.5 LBS | HEIGHT: 64 IN

## 2025-04-23 DIAGNOSIS — R10.2 PELVIC PAIN: Primary | ICD-10-CM

## 2025-04-23 PROCEDURE — 1036F TOBACCO NON-USER: CPT | Performed by: NURSE PRACTITIONER

## 2025-04-23 PROCEDURE — 3008F BODY MASS INDEX DOCD: CPT | Performed by: NURSE PRACTITIONER

## 2025-04-23 PROCEDURE — 99214 OFFICE O/P EST MOD 30 MIN: CPT | Performed by: NURSE PRACTITIONER

## 2025-04-23 ASSESSMENT — PAIN SCALES - GENERAL: PAINLEVEL_OUTOF10: 0-NO PAIN

## 2025-04-23 ASSESSMENT — ENCOUNTER SYMPTOMS
LOSS OF SENSATION IN FEET: 1
NEUROLOGICAL NEGATIVE: 1
ALLERGIC/IMMUNOLOGIC NEGATIVE: 1
EYES NEGATIVE: 1
GASTROINTESTINAL NEGATIVE: 1
SHORTNESS OF BREATH: 0
FLANK PAIN: 1
CONSTITUTIONAL NEGATIVE: 1
DEPRESSION: 0
PSYCHIATRIC NEGATIVE: 1
ENDOCRINE NEGATIVE: 1
BACK PAIN: 1
RESPIRATORY NEGATIVE: 1
OCCASIONAL FEELINGS OF UNSTEADINESS: 0
PALPITATIONS: 0

## 2025-04-23 ASSESSMENT — PATIENT HEALTH QUESTIONNAIRE - PHQ9
2. FEELING DOWN, DEPRESSED OR HOPELESS: NOT AT ALL
10. IF YOU CHECKED OFF ANY PROBLEMS, HOW DIFFICULT HAVE THESE PROBLEMS MADE IT FOR YOU TO DO YOUR WORK, TAKE CARE OF THINGS AT HOME, OR GET ALONG WITH OTHER PEOPLE: NOT DIFFICULT AT ALL
1. LITTLE INTEREST OR PLEASURE IN DOING THINGS: MORE THAN HALF THE DAYS
SUM OF ALL RESPONSES TO PHQ9 QUESTIONS 1 AND 2: 2

## 2025-04-23 NOTE — PATIENT INSTRUCTIONS
It was a pleasure taking care of you today and appreciate your seeing us at our [facility].     Today's plan is as follows:  1) Complete a abdominal/pelvis ultrasound here in [place]. Please do not eat or drink anything for 8 hours prior to the ultrasound.   2) Follow-up with Dr. Ordaz after the ultrasound is completed for further planning    Please call the office with any questions at [phone].   You can speak to our secretaries or our clinical nurses for specific questions.   For [facility] specific questions, you can also call [phone] or email at [email or web address]  If you need coordinating your appointments and testing you can do these at the  or by calling my office shortly after your visit.

## 2025-04-23 NOTE — PROGRESS NOTES
F/U REASON: pelvic pain    CURRENT ENCOUNTER:  Armida Adkins is 34 y.o. female here for pelvic pain. She reports the majority of her pain is to her pelvic/left flank which is sharp. She recently went to the ED in March 2025 for this and underwent a CT of the abdomen/pelvis.     The abdominal heaviness, worse with urination, she has to massage the area to help with the pain.    She feels 20% better since her initial treatment with Dr. Ordaz    She reports she cannot have intercourse bending over or laying flat with her legs up due to the severe pain    Notes some   Abdominal pain (not during menstruation): yes  Abdominal heaviness: yes  Abdominal discomfort: no  Pain in the sacrum and coccyx: yes shocking pain  Dysuria: no  Atypical varicosis: no  Dyspareunia (during and/or after sexual intercourse): yes, last 15 mins  Menstrual disorders: yes period twice a month  Tenderness in the external genitalia area and perineum: yes  Edema in the external genitalia area and perineum: yes    Medical History[1]  Surgical History[2]  Social History     Socioeconomic History    Marital status:      Spouse name: Not on file    Number of children: Not on file    Years of education: Not on file    Highest education level: Not on file   Occupational History    Not on file   Tobacco Use    Smoking status: Never     Passive exposure: Past    Smokeless tobacco: Never   Substance and Sexual Activity    Alcohol use: Not Currently    Drug use: Never    Sexual activity: Not on file   Other Topics Concern    Not on file   Social History Narrative    Not on file     Social Drivers of Health     Financial Resource Strain: Not on file   Food Insecurity: Not on file   Transportation Needs: Not on file   Physical Activity: Not on file   Stress: Not on file   Social Connections: Not on file   Intimate Partner Violence: Not on file   Housing Stability: Not on file     Family History[3]    Meds:   Current Medications[4]    Allergies:   RX  "Allergies[5]    ROS:  Review of Systems   Constitutional: Negative.    HENT: Negative.     Eyes: Negative.    Respiratory: Negative.  Negative for shortness of breath.    Cardiovascular:  Negative for chest pain and palpitations.   Gastrointestinal: Negative.    Endocrine: Negative.    Genitourinary:  Positive for flank pain, menstrual problem, pelvic pain and vaginal pain.   Musculoskeletal:  Positive for back pain.   Skin: Negative.    Allergic/Immunologic: Negative.    Neurological: Negative.    Psychiatric/Behavioral: Negative.          Objective:  Vitals:  Vitals:    04/23/25 0923   BP: 103/73   Pulse: 83   Resp:     /73 (BP Location: Left arm, Patient Position: Sitting, BP Cuff Size: Adult)   Pulse 83   Resp 16   Ht 1.626 m (5' 4\")   Wt 76.4 kg (168 lb 8 oz)   BMI 28.92 kg/m²     Physical Exam  Vitals reviewed.   Constitutional:       Appearance: Normal appearance.   HENT:      Head: Normocephalic and atraumatic.      Nose: Nose normal.      Mouth/Throat:      Mouth: Mucous membranes are moist.   Eyes:      Conjunctiva/sclera: Conjunctivae normal.   Cardiovascular:      Rate and Rhythm: Normal rate.      Pulses: Normal pulses.   Pulmonary:      Effort: Pulmonary effort is normal.   Musculoskeletal:         General: Normal range of motion.      Cervical back: Normal range of motion.   Skin:     General: Skin is warm and dry.      Capillary Refill: Capillary refill takes less than 2 seconds.   Neurological:      General: No focal deficit present.      Mental Status: She is alert and oriented to person, place, and time.         Labs:  Lab Results   Component Value Date    WBC 11.0 03/03/2025    WBC 9.2 10/16/2024    WBC 15.9 (H) 07/29/2024    HGB 12.9 03/03/2025    HGB 12.0 10/16/2024    HGB 12.5 07/29/2024    HCT 38.0 03/03/2025    HCT 36.5 10/16/2024    HCT 36.9 07/29/2024    MCV 78 (L) 03/03/2025    MCV 82 10/16/2024    MCV 82 07/29/2024     03/03/2025     Lab Results   Component Value Date "    CREATININE 0.82 03/03/2025    CREATININE 0.96 12/18/2024    CREATININE 0.89 10/16/2024    BUN 12 03/03/2025    BUN 12 12/18/2024    BUN 7 10/16/2024     03/03/2025     12/18/2024     10/16/2024    K 4.2 03/03/2025    K 4.2 12/18/2024    K 3.6 10/16/2024     03/03/2025     12/18/2024     10/16/2024    CO2 27 03/03/2025    CO2 27 12/18/2024    CO2 27 10/16/2024       Assessment & Plan:  Armida Adkins is 34 y.o. female with a history of pelvic/L flank pain who is presents with pelvic pain, intermittent on the left, with L flank pain.      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 Sanchez 2/9/2023. Pt states that she has been experience intermittent left flank pain and pelvic pain for approximately three years The pelvic pain leads to significant dyspareunia. Pt also experiences left abdominal pain during intercourse that is severe and bloating with abdominal pain during menses. No alleviating or aggravating pain. The pain is intermittent.  Describes it as sharp and severe.  She underwent a venogram which demonstrated a pressure differential between the left renal vein and IVC of 4 mmHg  After the procedure the pt went into afib with RVR and underwent cardioversion, was on Eliquis until May 2023      + left flank pain at random times, no hematuria, + abdominal bloating     12/11/23: ULTRASOUND RIGHT JUGULAR VEIN ACCESS, LEFT RENAL VEIN, BILATERAL ILIOFEMORAL VENOGRAPHY, AND IVUS LEFT GONADAL AND PELVIC VENOGRAM      2/19/24 RIGHT JUGULAR ACCESS WITH FOAM SCLEROTHERAPY, EMBOLIZATION OF GONADAL VEIN with Dr. Marianna Ordaz     4/15/24: Venogram, R jugular approach, pelvic venogram. 3% STS sclerotherapy w/Orlin    It was a pleasure taking care of you today and appreciate your seeing us at our [facility] [place] [facility].     Today's plan is as follows:  1) Complete a abdominal/pelvis ultrasound  here in [place]. Please do not eat or drink anything for 8 hours prior to the ultrasound.   2) Follow-up with Dr. Ordaz after the ultrasound is completed for further planning    Pt CT imaging and plan of care reviewed and discussed with Dr. Orlin London, DNP, APRN-CNP, AGPCNP-C, FNP-C, AGACNP-BC  Senior Nurse Practitioner, Vascular Surgery  [facility], [facility] [place]  [address] [facility], Office [phone]               [1]   Past Medical History:  Diagnosis Date    Acute sinusitis, unspecified 2016    Acute rhinosinusitis    Encounter for initial prescription of contraceptive pills 08/10/2018    Encounter for initial prescription of contraceptive pills    Encounter for removal of intrauterine contraceptive device 2016    Encounter for IUD removal    Encounter for screening for malignant neoplasm of cervix 2015    Pap smear for cervical cancer screening    Myopia, bilateral 2014    Bilateral myopia    Other conditions influencing health status      0    Other specified health status 2017    Contraception    Other specified symptoms and signs involving the digestive system and abdomen 2015    Difficulty swallowing pills    Pain in right shoulder 10/07/2020    Right shoulder pain    Pelvic and perineal pain 2021    Pelvic pain    Personal history of other diseases of the digestive system 2015    History of hemorrhoids    Personal history of other diseases of the female genital tract 08/10/2018    History of vaginal discharge    Personal history of other diseases of the female genital tract 08/10/2018    History of vulvodynia    Personal history of other diseases of the female genital tract 2015    History of metrorrhagia    Personal history of other diseases of the musculoskeletal system and  connective tissue 10/13/2022    History of scoliosis    Personal history of other diseases of the respiratory system     History of asthma    Personal history of other specified conditions     H/O shortness of breath    Unspecified astigmatism, bilateral 12/01/2014    Astigmatism, bilateral   [2]   Past Surgical History:  Procedure Laterality Date    INVASIVE VASCULAR PROCEDURE N/A 4/15/2024    Procedure: VENOGRAM;  Surgeon: Marianna Ordaz MD;  Location: [facility];  Service: Vascular Surgery;  Laterality: N/A;  R jugular approach, pelvic venogram. 3% STS sclerotherapy    IR ANGIOGRAM INFERIOR EPIGASTRIC  2/9/2023    IR ANGIOGRAM INFERIOR EPIGASTRIC 2/9/2023 [facility] CLINICAL LEGACY    IR ANGIOGRAM INFERIOR EPIGASTRIC PELVIC  2/9/2023    IR ANGIOGRAM INFERIOR EPIGASTRIC PELVIC 2/9/2023 [facility] CLINICAL LEGACY    IR ANGIOGRAM INFERIOR EPIGASTRIC PELVIC  2/9/2023    IR ANGIOGRAM INFERIOR EPIGASTRIC PELVIC 2/9/2023 [facility] CLINICAL LEGACY    IR VENOGRAM LOWER EXTREMITY Left 12/11/2023    IR VENOGRAM LOWER EXTREMITY 12/11/2023 Marianna Ordaz MD [facility] ANGIO    OTHER SURGICAL HISTORY  08/18/2020    Removal    OTHER SURGICAL HISTORY  07/31/2015    Surgery Suture Of Gum Laceration   [3]   Family History  Problem Relation Name Age of Onset    Uterine cancer Mother      Seizures Mother      Other (cerebrovascular accident) Father      Uterine cancer Sister      Colon cancer Mother's Brother     [4]   Current Outpatient Medications:     acetaminophen (Tylenol) 325 mg tablet, Take 2 tablets (650 mg) by mouth every 6 hours if needed for mild pain (1 - 3)., Disp: 30 tablet, Rfl: 0    albuterol 2.5 mg /3 mL (0.083 %) nebulizer solution, Take 3 mL (2.5 mg) by nebulization every 4 hours if needed for wheezing., Disp: 75 mL, Rfl: 5    albuterol 90 mcg/actuation inhaler, Inhale 1-2 puffs every 4 hours if needed for shortness of breath., Disp: 18 g, Rfl: 3    budesonide-formoterol (Symbicort) 80-4.5 mcg/actuation inhaler, Inhale 2 puffs 2 times  a day. Rinse mouth with water after use to reduce aftertaste and incidence of candidiasis. Do not swallow., Disp: 51 g, Rfl: 3    cyclobenzaprine (Flexeril) 10 mg tablet, Take 1 tablet (10 mg) by mouth 3 times a day as needed for muscle spasms., Disp: 30 tablet, Rfl: 1    ergocalciferol (Vitamin D-2) 1.25 MG (46479 UT) capsule, Take 1 capsule (1,250 mcg) by mouth 2 times a week., Disp: 8 capsule, Rfl: 0    famotidine (Pepcid) 20 mg tablet, Take 1 tablet (20 mg) by mouth 2 times a day as needed for heartburn., Disp: 60 tablet, Rfl: 5    hydrOXYzine pamoate (Vistaril) 25 mg capsule, Take 1 capsule (25 mg) by mouth every 8 hours if needed for anxiety., Disp: , Rfl:     mirtazapine (Remeron) 15 mg tablet, Take 1 tablet (15 mg) by mouth once daily at bedtime., Disp: , Rfl:     polyethylene glycol (Glycolax, Miralax) 17 gram packet, Take 17 g by mouth once daily. In 8 ounces of juice or water. Can decrease the dose to achieve one bowel movement daily.., Disp: 30 packet, Rfl: 2    gabapentin (Neurontin) 300 mg capsule, Take 1 capsule (300 mg) by mouth once daily at bedtime., Disp: 30 capsule, Rfl: 2    hydrocortisone 2.5 % cream, Apply twice daily to affected areas (Patient not taking: Reported on 4/23/2025), Disp: , Rfl:     ondansetron ODT (Zofran-ODT) 4 mg disintegrating tablet, Take 1 tablet (4 mg) by mouth every 8 hours if needed for nausea or vomiting., Disp: 20 tablet, Rfl: 0  [5]   Allergies  Allergen Reactions    Benzonatate Unknown    Coconut Itching    Erythromycin-Benzoyl Peroxide Unknown    Fruit Flavor Unknown    Peach Itching    Pineapple Itching    Pollen Extracts Unknown    Sulfamethoxazole-Trimethoprim Swelling

## 2025-04-29 ENCOUNTER — OFFICE VISIT (OUTPATIENT)
Dept: CARDIOLOGY | Facility: CLINIC | Age: 35
End: 2025-04-29
Payer: MEDICARE

## 2025-04-29 ENCOUNTER — HOSPITAL ENCOUNTER (OUTPATIENT)
Dept: CARDIOLOGY | Facility: CLINIC | Age: 35
Discharge: HOME | End: 2025-04-29
Payer: MEDICARE

## 2025-04-29 VITALS
WEIGHT: 170.1 LBS | DIASTOLIC BLOOD PRESSURE: 78 MMHG | BODY MASS INDEX: 29.04 KG/M2 | SYSTOLIC BLOOD PRESSURE: 115 MMHG | TEMPERATURE: 98.4 F | HEIGHT: 64 IN | HEART RATE: 96 BPM

## 2025-04-29 DIAGNOSIS — R00.2 PALPITATIONS: ICD-10-CM

## 2025-04-29 DIAGNOSIS — I47.19 AVNRT (AV NODAL RE-ENTRY TACHYCARDIA): Primary | ICD-10-CM

## 2025-04-29 DIAGNOSIS — R07.9 CHEST PAIN, UNSPECIFIED TYPE: ICD-10-CM

## 2025-04-29 DIAGNOSIS — I47.19 AVNRT (AV NODAL RE-ENTRY TACHYCARDIA): ICD-10-CM

## 2025-04-29 LAB — BODY SURFACE AREA: 1.87 M2

## 2025-04-29 PROCEDURE — 93010 ELECTROCARDIOGRAM REPORT: CPT | Performed by: INTERNAL MEDICINE

## 2025-04-29 PROCEDURE — 1036F TOBACCO NON-USER: CPT | Performed by: NURSE PRACTITIONER

## 2025-04-29 PROCEDURE — 99212 OFFICE O/P EST SF 10 MIN: CPT | Performed by: NURSE PRACTITIONER

## 2025-04-29 PROCEDURE — 93242 EXT ECG>48HR<7D RECORDING: CPT

## 2025-04-29 PROCEDURE — 93005 ELECTROCARDIOGRAM TRACING: CPT | Mod: 59 | Performed by: NURSE PRACTITIONER

## 2025-04-29 PROCEDURE — 3008F BODY MASS INDEX DOCD: CPT | Performed by: NURSE PRACTITIONER

## 2025-04-29 PROCEDURE — 99204 OFFICE O/P NEW MOD 45 MIN: CPT | Performed by: NURSE PRACTITIONER

## 2025-04-29 ASSESSMENT — PAIN SCALES - GENERAL: PAINLEVEL_OUTOF10: 4

## 2025-04-29 NOTE — PROGRESS NOTES
Chief Complaint:   AVNRT      History Of Present Illness:    Armida Adkins is a 34 y.o. 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 Was following with Dr. Turpin. Underwent AVNRT RFA in 2023.     Today she c/o exertional chest pain, SOB and tachycardia. States if she climbs the stairs, walks long distances or at times when walking in the grocery store she will develop mid sternal chest pressure. It is always accompanied by SOB and tachycardia. Per smart watch heart rates will become as elevated as 125-135bpm. Symptoms resolve with rest.     These symptoms are identical to what she had prior to AVNRT RFA.     She does report an increase in stress.     Caffeine: soda   Drinks a lot of soda (ginger ale, 7 up)  Water: not drinking a lot   ETOH: none  Vape: none     2/2023  She was undergoing a venogram by IR, developed a narrow complex tachycardia which was described by treating physician as A flutter and underwent successful DCC. Was admitted ot the Hospital and started on anticoagulation. Evaluated by EP, Dr. Turpin, holter placed. 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       Past Medical History:  She has a past medical history of Acute sinusitis, unspecified (02/01/2016), Encounter for initial prescription of contraceptive pills (08/10/2018), Encounter for removal of intrauterine contraceptive device (03/01/2016), Encounter for screening for malignant neoplasm of cervix (11/24/2015), Myopia, bilateral (12/01/2014), Other conditions influencing health status, Other specified health status (05/23/2017), Other specified symptoms and signs involving the digestive system and abdomen (07/09/2015), Pain in right shoulder (10/07/2020), Pelvic and perineal pain (05/28/2021), Personal history of other diseases of the digestive system (07/30/2015), Personal history of other diseases of the female genital tract (08/10/2018), Personal history of other diseases of the female genital tract (08/10/2018), Personal history of other diseases of the  "female genital tract (09/11/2015), Personal history of other diseases of the musculoskeletal system and connective tissue (10/13/2022), Personal history of other diseases of the respiratory system, Personal history of other specified conditions, and Unspecified astigmatism, bilateral (12/01/2014).    Past Surgical History:  She has a past surgical history that includes Other surgical history (08/18/2020); Other surgical history (07/31/2015); IR angiogram inferior epigastric pelvic (2/9/2023); IR angiogram inferior epigastric pelvic (2/9/2023); IR angiogram inferior epigastric (2/9/2023); IR venogram lower extremity (Left, 12/11/2023); and Invasive Vascular Procedure (N/A, 4/15/2024).      Social History:  She reports that she has never smoked. She has been exposed to tobacco smoke. She has never used smokeless tobacco. She reports that she does not currently use alcohol. She reports that she does not use drugs.    Family History:  Family History[1]  Allergies:  Benzonatate, Coconut, Erythromycin-benzoyl peroxide, Fruit flavor, Peach, Pineapple, Pollen extracts, and Sulfamethoxazole-trimethoprim    Review of Systems  All pertinent systems have been reviewed and are negative except for what is stated in the history of present illness.    All other systems have been reviewed and are negative and noncontributory to this patient's current ailments.     Visit Vitals  /78 (BP Location: Right arm, Patient Position: Sitting, BP Cuff Size: Adult)   Pulse 96   Temp 36.9 °C (98.4 °F) (Tympanic)   Ht 1.626 m (5' 4\")   Wt 77.2 kg (170 lb 1.6 oz)   BMI 29.20 kg/m²   OB Status Having periods   Smoking Status Never   BSA 1.87 m²     Objective   Vitals reviewed.   Constitutional:       Appearance: Healthy appearance. Not in distress.   Eyes:      Conjunctiva/sclera: Conjunctivae normal.   Neck:      Vascular: No JVR. JVD normal.   Pulmonary:      Effort: Pulmonary effort is normal.      Breath sounds: Normal breath sounds. No " wheezing. No rhonchi. No rales.   Chest:      Chest wall: Not tender to palpatation.   Cardiovascular:      PMI at left midclavicular line. Normal rate. Regular rhythm. Normal S1. Normal S2.       Murmurs: There is no murmur.      No gallop.  No click. No rub.   Edema:     Peripheral edema absent.   Abdominal:      General: Bowel sounds are normal.      Palpations: Abdomen is soft.      Tenderness: There is no abdominal tenderness.   Musculoskeletal: Normal range of motion.         General: No tenderness. Skin:     General: Skin is warm and dry.   Neurological:      General: No focal deficit present.      Mental Status: Alert and oriented to person, place and time.   Psychiatric:         Attention and Perception: Attention normal.         Mood and Affect: Mood normal.       Assessment/Plan   Diagnoses and all orders for this visit:  AVNRT (AV kat re-entry tachycardia)  - symptoms consistent with what she had previously with AVNRT  - placing holter  Palpitations  - encouraged her to drink water  - trial electrolyte drink  - trial magnesium  Chest pain, unspecified type  - in light of her having chest pain with AVNRT previously, and HR elevated with chest pain this does sound more like an electrical issue vs CAD. Her risk for CAD is low with normal BP, no early family hx, does not smoke, lipids mildly elevated and no DM  - if nothing remarkable on holter then we will pursue stress test   - EKG today is NSR at 88bpm anterior TWI noted on previous EKG     Follow up 1 month    Outpatient Medications:  Current Outpatient Medications   Medication Instructions    acetaminophen (TYLENOL) 650 mg, oral, Every 6 hours PRN    albuterol 90 mcg/actuation inhaler 1-2 puffs, inhalation, Every 4 hours PRN    albuterol 2.5 mg, nebulization, Every 4 hours PRN    budesonide-formoterol (Symbicort) 80-4.5 mcg/actuation inhaler 2 puffs, inhalation, 2 times daily RT, Rinse mouth with water after use to reduce aftertaste and incidence of  candidiasis. Do not swallow.    cyclobenzaprine (FLEXERIL) 10 mg, oral, 3 times daily PRN    ergocalciferol (VITAMIN D-2) 1,250 mcg, oral, 2 times weekly    famotidine (PEPCID) 20 mg, oral, 2 times daily PRN    gabapentin (NEURONTIN) 300 mg, oral, Nightly    hydrocortisone 2.5 % cream Apply twice daily to affected areas    hydrOXYzine pamoate (Vistaril) 25 mg capsule 1 capsule, Every 8 hours PRN    mirtazapine (REMERON) 15 mg, Nightly    ondansetron ODT (ZOFRAN-ODT) 4 mg, oral, Every 8 hours PRN    polyethylene glycol (GLYCOLAX, MIRALAX) 17 g, oral, Daily, In 8 ounces of juice or water. Can decrease the dose to achieve one bowel movement daily..     Exclusive of any other services or procedures performed, I, Aida TAYLOR, spent 45 minutes in duration for this visit today.  This time consisted of chart review, obtaining history, and/or performing the exam as documented above, as well as, documenting the clinical information for the encounter in the electronic record, discussing treatment options, plans, and/or goals with patient, family, and/or caregiver, refilling medications, updating the electronic record, ordering medicines, lab work, imaging, referrals, and/or procedures as documented above and communicating with other [facility] professionals. I have discussed the results of laboratory, radiology, and cardiology studies with the patient and their family/caregiver.         I reviewed hospital records, pcp note, previous EP notes          [1]   Family History  Problem Relation Name Age of Onset    Uterine cancer Mother      Seizures Mother      Other (cerebrovascular accident) Father      Uterine cancer Sister      Colon cancer Mother's Brother

## 2025-04-29 NOTE — PATIENT INSTRUCTIONS
Magnesium glycinate or oxide 400mg once a day with food   Hydrate more water   Add electrolyte drink once a day in am (LMNT, liquid IV, ultima)     Vasovagal techniques when heart racing  - cough  - bear down   - blow out forcefully via a straw

## 2025-05-01 DIAGNOSIS — J45.20 MILD INTERMITTENT ASTHMA WITHOUT COMPLICATION (HHS-HCC): ICD-10-CM

## 2025-05-01 LAB
ATRIAL RATE: 88 BPM
P AXIS: 50 DEGREES
P OFFSET: 200 MS
P ONSET: 159 MS
PR INTERVAL: 132 MS
Q ONSET: 225 MS
QRS COUNT: 14 BEATS
QRS DURATION: 76 MS
QT INTERVAL: 342 MS
QTC CALCULATION(BAZETT): 413 MS
QTC FREDERICIA: 388 MS
R AXIS: 49 DEGREES
T AXIS: 46 DEGREES
T OFFSET: 396 MS
VENTRICULAR RATE: 88 BPM

## 2025-05-01 RX ORDER — BUDESONIDE AND FORMOTEROL FUMARATE DIHYDRATE 80; 4.5 UG/1; UG/1
AEROSOL RESPIRATORY (INHALATION)
Qty: 8 G | Refills: 2 | Status: SHIPPED | OUTPATIENT
Start: 2025-05-01

## 2025-05-06 ENCOUNTER — APPOINTMENT (OUTPATIENT)
Dept: OBSTETRICS AND GYNECOLOGY | Facility: CLINIC | Age: 35
End: 2025-05-06
Payer: MEDICARE

## 2025-05-14 ENCOUNTER — APPOINTMENT (OUTPATIENT)
Dept: VASCULAR SURGERY | Facility: CLINIC | Age: 35
End: 2025-05-14
Payer: COMMERCIAL

## 2025-05-19 LAB — BODY SURFACE AREA: 1.87 M2

## 2025-05-21 ENCOUNTER — HOSPITAL ENCOUNTER (OUTPATIENT)
Dept: VASCULAR MEDICINE | Facility: CLINIC | Age: 35
Discharge: HOME | End: 2025-05-21
Payer: MEDICARE

## 2025-05-21 DIAGNOSIS — R10.2 PELVIC PAIN: ICD-10-CM

## 2025-05-21 DIAGNOSIS — I87.1 COMPRESSION OF VEIN: ICD-10-CM

## 2025-05-21 PROCEDURE — 93975 VASCULAR STUDY: CPT | Performed by: SURGERY

## 2025-05-21 PROCEDURE — 93975 VASCULAR STUDY: CPT

## 2025-05-23 ENCOUNTER — HOSPITAL ENCOUNTER (EMERGENCY)
Facility: HOSPITAL | Age: 35
Discharge: HOME | End: 2025-05-23
Payer: MEDICARE

## 2025-05-23 ENCOUNTER — APPOINTMENT (OUTPATIENT)
Dept: RADIOLOGY | Facility: HOSPITAL | Age: 35
End: 2025-05-23
Payer: MEDICARE

## 2025-05-23 VITALS
HEIGHT: 64 IN | BODY MASS INDEX: 29.02 KG/M2 | RESPIRATION RATE: 16 BRPM | SYSTOLIC BLOOD PRESSURE: 102 MMHG | WEIGHT: 170 LBS | DIASTOLIC BLOOD PRESSURE: 77 MMHG | OXYGEN SATURATION: 99 % | HEART RATE: 70 BPM | TEMPERATURE: 97.8 F

## 2025-05-23 DIAGNOSIS — I27.20 PULMONARY HYPERTENSION (MULTI): Primary | ICD-10-CM

## 2025-05-23 DIAGNOSIS — R10.9 FLANK PAIN: ICD-10-CM

## 2025-05-23 LAB
ALBUMIN SERPL BCP-MCNC: 4 G/DL (ref 3.4–5)
ALP SERPL-CCNC: 123 U/L (ref 33–110)
ALT SERPL W P-5'-P-CCNC: 9 U/L (ref 7–45)
ANION GAP SERPL CALC-SCNC: 13 MMOL/L (ref 10–20)
APPEARANCE UR: ABNORMAL
AST SERPL W P-5'-P-CCNC: 11 U/L (ref 9–39)
BACTERIA #/AREA URNS AUTO: ABNORMAL /HPF
BASOPHILS # BLD AUTO: 0.05 X10*3/UL (ref 0–0.1)
BASOPHILS NFR BLD AUTO: 0.5 %
BILIRUB SERPL-MCNC: 0.7 MG/DL (ref 0–1.2)
BILIRUB UR STRIP.AUTO-MCNC: NEGATIVE MG/DL
BUN SERPL-MCNC: 10 MG/DL (ref 6–23)
CALCIUM SERPL-MCNC: 8.9 MG/DL (ref 8.6–10.3)
CHLORIDE SERPL-SCNC: 104 MMOL/L (ref 98–107)
CO2 SERPL-SCNC: 24 MMOL/L (ref 21–32)
COLOR UR: YELLOW
CREAT SERPL-MCNC: 0.84 MG/DL (ref 0.5–1.05)
EGFRCR SERPLBLD CKD-EPI 2021: >90 ML/MIN/1.73M*2
EOSINOPHIL # BLD AUTO: 0.17 X10*3/UL (ref 0–0.7)
EOSINOPHIL NFR BLD AUTO: 1.7 %
ERYTHROCYTE [DISTWIDTH] IN BLOOD BY AUTOMATED COUNT: 13.7 % (ref 11.5–14.5)
GLUCOSE SERPL-MCNC: 91 MG/DL (ref 74–99)
GLUCOSE UR STRIP.AUTO-MCNC: NORMAL MG/DL
HCG UR QL IA.RAPID: NEGATIVE
HCT VFR BLD AUTO: 37.5 % (ref 36–46)
HGB BLD-MCNC: 12.2 G/DL (ref 12–16)
HOLD SPECIMEN: 293
IMM GRANULOCYTES # BLD AUTO: 0.07 X10*3/UL (ref 0–0.7)
IMM GRANULOCYTES NFR BLD AUTO: 0.7 % (ref 0–0.9)
KETONES UR STRIP.AUTO-MCNC: NEGATIVE MG/DL
LEUKOCYTE ESTERASE UR QL STRIP.AUTO: NEGATIVE
LYMPHOCYTES # BLD AUTO: 2.09 X10*3/UL (ref 1.2–4.8)
LYMPHOCYTES NFR BLD AUTO: 20.8 %
MCH RBC QN AUTO: 26 PG (ref 26–34)
MCHC RBC AUTO-ENTMCNC: 32.5 G/DL (ref 32–36)
MCV RBC AUTO: 80 FL (ref 80–100)
MONOCYTES # BLD AUTO: 0.52 X10*3/UL (ref 0.1–1)
MONOCYTES NFR BLD AUTO: 5.2 %
MUCOUS THREADS #/AREA URNS AUTO: ABNORMAL /LPF
NEUTROPHILS # BLD AUTO: 7.15 X10*3/UL (ref 1.2–7.7)
NEUTROPHILS NFR BLD AUTO: 71.1 %
NITRITE UR QL STRIP.AUTO: NEGATIVE
NRBC BLD-RTO: 0 /100 WBCS (ref 0–0)
PH UR STRIP.AUTO: 5.5 [PH]
PLATELET # BLD AUTO: 311 X10*3/UL (ref 150–450)
POTASSIUM SERPL-SCNC: 3.8 MMOL/L (ref 3.5–5.3)
PROT SERPL-MCNC: 6.7 G/DL (ref 6.4–8.2)
PROT UR STRIP.AUTO-MCNC: ABNORMAL MG/DL
RBC # BLD AUTO: 4.7 X10*6/UL (ref 4–5.2)
RBC # UR STRIP.AUTO: NEGATIVE MG/DL
RBC #/AREA URNS AUTO: ABNORMAL /HPF
SODIUM SERPL-SCNC: 137 MMOL/L (ref 136–145)
SP GR UR STRIP.AUTO: 1.02
SQUAMOUS #/AREA URNS AUTO: ABNORMAL /HPF
UROBILINOGEN UR STRIP.AUTO-MCNC: NORMAL MG/DL
WBC # BLD AUTO: 10.1 X10*3/UL (ref 4.4–11.3)
WBC #/AREA URNS AUTO: ABNORMAL /HPF

## 2025-05-23 PROCEDURE — 85025 COMPLETE CBC W/AUTO DIFF WBC: CPT | Performed by: NURSE PRACTITIONER

## 2025-05-23 PROCEDURE — 74176 CT ABD & PELVIS W/O CONTRAST: CPT | Performed by: RADIOLOGY

## 2025-05-23 PROCEDURE — 71046 X-RAY EXAM CHEST 2 VIEWS: CPT

## 2025-05-23 PROCEDURE — 84075 ASSAY ALKALINE PHOSPHATASE: CPT | Performed by: NURSE PRACTITIONER

## 2025-05-23 PROCEDURE — 74176 CT ABD & PELVIS W/O CONTRAST: CPT

## 2025-05-23 PROCEDURE — 36415 COLL VENOUS BLD VENIPUNCTURE: CPT | Performed by: NURSE PRACTITIONER

## 2025-05-23 PROCEDURE — 96375 TX/PRO/DX INJ NEW DRUG ADDON: CPT | Mod: 59

## 2025-05-23 PROCEDURE — 2550000001 HC RX 255 CONTRASTS: Performed by: NURSE PRACTITIONER

## 2025-05-23 PROCEDURE — 71275 CT ANGIOGRAPHY CHEST: CPT | Performed by: RADIOLOGY

## 2025-05-23 PROCEDURE — 96374 THER/PROPH/DIAG INJ IV PUSH: CPT | Mod: 59

## 2025-05-23 PROCEDURE — 81025 URINE PREGNANCY TEST: CPT | Performed by: NURSE PRACTITIONER

## 2025-05-23 PROCEDURE — 81001 URINALYSIS AUTO W/SCOPE: CPT | Performed by: NURSE PRACTITIONER

## 2025-05-23 PROCEDURE — 71275 CT ANGIOGRAPHY CHEST: CPT

## 2025-05-23 PROCEDURE — 2500000004 HC RX 250 GENERAL PHARMACY W/ HCPCS (ALT 636 FOR OP/ED): Performed by: NURSE PRACTITIONER

## 2025-05-23 PROCEDURE — 99285 EMERGENCY DEPT VISIT HI MDM: CPT | Mod: 25

## 2025-05-23 PROCEDURE — 71046 X-RAY EXAM CHEST 2 VIEWS: CPT | Performed by: RADIOLOGY

## 2025-05-23 RX ORDER — ONDANSETRON HYDROCHLORIDE 2 MG/ML
4 INJECTION, SOLUTION INTRAVENOUS ONCE
Status: COMPLETED | OUTPATIENT
Start: 2025-05-23 | End: 2025-05-23

## 2025-05-23 RX ORDER — KETOROLAC TROMETHAMINE 30 MG/ML
15 INJECTION, SOLUTION INTRAMUSCULAR; INTRAVENOUS ONCE
Status: COMPLETED | OUTPATIENT
Start: 2025-05-23 | End: 2025-05-23

## 2025-05-23 RX ORDER — OXYCODONE AND ACETAMINOPHEN 5; 325 MG/1; MG/1
1 TABLET ORAL EVERY 6 HOURS PRN
Qty: 5 TABLET | Refills: 0 | Status: SHIPPED | OUTPATIENT
Start: 2025-05-23 | End: 2025-05-29 | Stop reason: ALTCHOICE

## 2025-05-23 RX ADMIN — KETOROLAC TROMETHAMINE 15 MG: 30 INJECTION, SOLUTION INTRAMUSCULAR at 09:38

## 2025-05-23 RX ADMIN — ONDANSETRON 4 MG: 2 INJECTION, SOLUTION INTRAMUSCULAR; INTRAVENOUS at 09:38

## 2025-05-23 RX ADMIN — IOHEXOL 75 ML: 350 INJECTION, SOLUTION INTRAVENOUS at 12:56

## 2025-05-23 ASSESSMENT — PAIN DESCRIPTION - FREQUENCY: FREQUENCY: CONSTANT/CONTINUOUS

## 2025-05-23 ASSESSMENT — PAIN DESCRIPTION - ONSET: ONSET: GRADUAL

## 2025-05-23 ASSESSMENT — PAIN DESCRIPTION - PROGRESSION: CLINICAL_PROGRESSION: NOT CHANGED

## 2025-05-23 ASSESSMENT — PAIN DESCRIPTION - PAIN TYPE: TYPE: ACUTE PAIN

## 2025-05-23 ASSESSMENT — PAIN DESCRIPTION - LOCATION: LOCATION: ABDOMEN

## 2025-05-23 ASSESSMENT — PAIN DESCRIPTION - ORIENTATION: ORIENTATION: RIGHT

## 2025-05-23 ASSESSMENT — PAIN DESCRIPTION - DESCRIPTORS: DESCRIPTORS: ACHING

## 2025-05-23 ASSESSMENT — PAIN SCALES - GENERAL: PAINLEVEL_OUTOF10: 9

## 2025-05-23 ASSESSMENT — PAIN - FUNCTIONAL ASSESSMENT: PAIN_FUNCTIONAL_ASSESSMENT: 0-10

## 2025-05-23 NOTE — ED TRIAGE NOTES
PT having right flank pain, states had US on right side to check veins in abd.  Denies trouble urinating or hematuria. No hx kidney stones.

## 2025-05-23 NOTE — ED PROVIDER NOTES
"HPI   Chief Complaint   Patient presents with    Flank Pain       34-year-old female presents today with right flank pain radiating up her back and right axilla.  She rates pain 9 out of 10 and constant.  Her last menstrual period ended on 5 1 and she is G0, P0.  She is not on birth control and her spouse is bedside.  No history of tubal ligation.  Multiple allergies.  Endorses urgency while urination.  She has been experiencing constipation x 3 years.  She recently had an ultrasound for varicose veins.  She endorses nausea but she has not vomited.  Pain started on Tuesday and it was \"bearable\" but now its unbearable.      History provided by:  Patient and spouse   used: No            Patient History   Medical History[1]  Surgical History[2]  Family History[3]  Social History[4]    Physical Exam   ED Triage Vitals [05/23/25 0854]   Temperature Heart Rate Respirations BP   36.6 °C (97.8 °F) 76 18 107/77      Pulse Ox Temp Source Heart Rate Source Patient Position   99 % Temporal -- Sitting      BP Location FiO2 (%)     Right arm --       Physical Exam  Constitutional:       Appearance: Normal appearance.   HENT:      Head: Normocephalic and atraumatic.      Right Ear: Tympanic membrane normal.      Left Ear: Tympanic membrane normal.      Nose: Nose normal.      Mouth/Throat:      Mouth: Mucous membranes are moist.   Eyes:      Pupils: Pupils are equal, round, and reactive to light.   Cardiovascular:      Rate and Rhythm: Normal rate and regular rhythm.      Pulses: Normal pulses.      Heart sounds: Normal heart sounds.   Pulmonary:      Effort: Pulmonary effort is normal.      Breath sounds: Normal breath sounds.   Abdominal:      General: Abdomen is flat.      Palpations: Abdomen is soft.      Tenderness: There is right CVA tenderness.   Musculoskeletal:         General: Normal range of motion.      Cervical back: Normal range of motion and neck supple.   Skin:     General: Skin is warm.      " Capillary Refill: Capillary refill takes less than 2 seconds.   Neurological:      General: No focal deficit present.      Mental Status: She is alert and oriented to person, place, and time.   Psychiatric:         Mood and Affect: Mood normal.           ED Course & MDM   Diagnoses as of 05/23/25 1405   Pulmonary hypertension (Multi)   Flank pain                 No data recorded     [place] Coma Scale Score: 15 (05/23/25 0855 : Janusz Chery, EMT)                           Medical Decision Making  CT T noncontrast ordered with right flank pain.  Chest x-ray ordered for right axilla pain.  Basic labs urinalysis urine pregnancy and she received 15 mg Toradol 4 mg Zofran    Patient responded well to 50 mg Toradol and Zofran and was pain-free.  Vital signs rechecked multiple times and stable.  Urine pregnancy was negative.  This lowers my concern for ectopic with her flank pain.  Urinalysis was negative for nitrates or leukocytes or ketones.  Kidney function was normal.  Liver function was normal.  Electrolytes were normal.    CBC was negative for leukocytosis left shift or acute anemia.  The CT abdomen pelvis with concern for kidney stone showed very small bilateral pleural effusion present along the bibasilar atelectasis or infiltrate.  I ordered a CT PE because the flank pain seemed to radiate up her back and was negative for pulmonary embolism but positive for pulmonary hypertension.  I requested appointment with pulmonology and gave patient contact information.  She received 6 tablets of Percocet for her pain.  She understands the importance of follow-up and return precautions.    Amount and/or Complexity of Data Reviewed  Labs: ordered.     Details: See MDM  Radiology: ordered and independent interpretation performed.     Details: See [facility]        Procedure  Procedures       [1]   Past Medical History:  Diagnosis Date    Acute sinusitis, unspecified 02/01/2016    Acute rhinosinusitis    Encounter for initial  prescription of contraceptive pills 08/10/2018    Encounter for initial prescription of contraceptive pills    Encounter for removal of intrauterine contraceptive device 2016    Encounter for IUD removal    Encounter for screening for malignant neoplasm of cervix 2015    Pap smear for cervical cancer screening    Myopia, bilateral 2014    Bilateral myopia    Other conditions influencing health status      0    Other specified health status 2017    Contraception    Other specified symptoms and signs involving the digestive system and abdomen 2015    Difficulty swallowing pills    Pain in right shoulder 10/07/2020    Right shoulder pain    Pelvic and perineal pain 2021    Pelvic pain    Personal history of other diseases of the digestive system 2015    History of hemorrhoids    Personal history of other diseases of the female genital tract 08/10/2018    History of vaginal discharge    Personal history of other diseases of the female genital tract 08/10/2018    History of vulvodynia    Personal history of other diseases of the female genital tract 2015    History of metrorrhagia    Personal history of other diseases of the musculoskeletal system and connective tissue 10/13/2022    History of scoliosis    Personal history of other diseases of the respiratory system     History of asthma    Personal history of other specified conditions     H/O shortness of breath    Unspecified astigmatism, bilateral 2014    Astigmatism, bilateral   [2]   Past Surgical History:  Procedure Laterality Date    INVASIVE VASCULAR PROCEDURE N/A 4/15/2024    Procedure: VENOGRAM;  Surgeon: Marianna Ordaz MD;  Location: [facility];  Service: Vascular Surgery;  Laterality: N/A;  R jugular approach, pelvic venogram. 3% STS sclerotherapy    IR ANGIOGRAM INFERIOR EPIGASTRIC  2023    IR ANGIOGRAM INFERIOR EPIGASTRIC 2023 [facility] CLINICAL LEGACY    IR ANGIOGRAM INFERIOR EPIGASTRIC  PELVIC  2/9/2023    IR ANGIOGRAM INFERIOR EPIGASTRIC PELVIC 2/9/2023 [facility] CLINICAL LEGACY    IR ANGIOGRAM INFERIOR EPIGASTRIC PELVIC  2/9/2023    IR ANGIOGRAM INFERIOR EPIGASTRIC PELVIC 2/9/2023 [facility] CLINICAL LEGACY    IR VENOGRAM LOWER EXTREMITY Left 12/11/2023    IR VENOGRAM LOWER EXTREMITY 12/11/2023 Marianna Ordaz MD [facility] ANGIO    OTHER SURGICAL HISTORY  08/18/2020    Removal    OTHER SURGICAL HISTORY  07/31/2015    Surgery Suture Of Gum Laceration   [3]   Family History  Problem Relation Name Age of Onset    Uterine cancer Mother      Seizures Mother      Other (cerebrovascular accident) Father      Uterine cancer Sister      Colon cancer Mother's Brother     [4]   Social History  Tobacco Use    Smoking status: Never     Passive exposure: Past    Smokeless tobacco: Never   Substance Use Topics    Alcohol use: Not Currently    Drug use: Never        Dieudonne Bustamante, ALISON-CNP  05/23/25 2723

## 2025-05-29 ENCOUNTER — OFFICE VISIT (OUTPATIENT)
Dept: CARDIOLOGY | Facility: CLINIC | Age: 35
End: 2025-05-29
Payer: MEDICARE

## 2025-05-29 VITALS
BODY MASS INDEX: 29.88 KG/M2 | HEART RATE: 87 BPM | WEIGHT: 175 LBS | HEIGHT: 64 IN | SYSTOLIC BLOOD PRESSURE: 114 MMHG | DIASTOLIC BLOOD PRESSURE: 74 MMHG

## 2025-05-29 DIAGNOSIS — R00.2 PALPITATIONS: Primary | ICD-10-CM

## 2025-05-29 DIAGNOSIS — I47.19 AVNRT (AV NODAL RE-ENTRY TACHYCARDIA): ICD-10-CM

## 2025-05-29 DIAGNOSIS — R06.09 OTHER FORMS OF DYSPNEA: ICD-10-CM

## 2025-05-29 DIAGNOSIS — R07.9 CHEST PAIN, UNSPECIFIED TYPE: ICD-10-CM

## 2025-05-29 PROCEDURE — 99212 OFFICE O/P EST SF 10 MIN: CPT | Performed by: NURSE PRACTITIONER

## 2025-05-29 PROCEDURE — 1036F TOBACCO NON-USER: CPT | Performed by: NURSE PRACTITIONER

## 2025-05-29 PROCEDURE — 99214 OFFICE O/P EST MOD 30 MIN: CPT | Performed by: NURSE PRACTITIONER

## 2025-05-29 PROCEDURE — 3008F BODY MASS INDEX DOCD: CPT | Performed by: NURSE PRACTITIONER

## 2025-05-29 RX ORDER — MIRTAZAPINE 7.5 MG/1
7.5 TABLET, FILM COATED ORAL NIGHTLY
COMMUNITY
Start: 2025-05-03

## 2025-05-29 RX ORDER — METOPROLOL SUCCINATE 25 MG/1
25 TABLET, EXTENDED RELEASE ORAL NIGHTLY
Qty: 30 TABLET | Refills: 0 | Status: SHIPPED | OUTPATIENT
Start: 2025-05-29 | End: 2026-05-29

## 2025-05-29 NOTE — PROGRESS NOTES
Chief Complaint:   AVNRT      History Of Present Illness:    Armida Adkins is a 34 y.o. 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 Was following with Dr. Turpin. Underwent AVNRT RFA in 2023.     She continues to note exertional SOB which is accompanied by chest pain. Lasts for 15 minutes and resolves. Can occur at rest. Chest pain is a mid-sternal pressure.     Today she c/o exertional chest pain, SOB and tachycardia. States if she climbs the stairs, walks long distances or at times when walking in the grocery store she will develop mid sternal chest pressure. It is always accompanied by SOB and tachycardia. Per smart watch heart rates will become as elevated as 125-135bpm. Symptoms resolve with rest.     These symptoms are identical to what she had prior to AVNRT RFA.     Holter placed at last visit showed rare PVCs, negative for AVNRT. Events correlated with NSR/ST (100bpm).     She does report an increase in stress.     Caffeine: soda   Drinks a lot of soda (ginger ale, 7 up)  Water: not drinking a lot   ETOH: none  Vape: none     2/2023  She was undergoing a venogram by IR, developed a narrow complex tachycardia which was described by treating physician as A flutter and underwent successful DCC. Was admitted ot the Hospital and started on anticoagulation. Evaluated by EP, Dr. Turpin, holter placed. 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       Past Medical History:  She has a past medical history of Acute sinusitis, unspecified (02/01/2016), Encounter for initial prescription of contraceptive pills (08/10/2018), Encounter for removal of intrauterine contraceptive device (03/01/2016), Encounter for screening for malignant neoplasm of cervix (11/24/2015), Myopia, bilateral (12/01/2014), Other conditions influencing health status, Other specified health status (05/23/2017), Other specified symptoms and signs involving the digestive system and abdomen (07/09/2015), Pain in right shoulder (10/07/2020), Pelvic and perineal pain  "(05/28/2021), Personal history of other diseases of the digestive system (07/30/2015), Personal history of other diseases of the female genital tract (08/10/2018), Personal history of other diseases of the female genital tract (08/10/2018), Personal history of other diseases of the female genital tract (09/11/2015), Personal history of other diseases of the musculoskeletal system and connective tissue (10/13/2022), Personal history of other diseases of the respiratory system, Personal history of other specified conditions, and Unspecified astigmatism, bilateral (12/01/2014).    Past Surgical History:  She has a past surgical history that includes Other surgical history (08/18/2020); Other surgical history (07/31/2015); IR angiogram inferior epigastric pelvic (2/9/2023); IR angiogram inferior epigastric pelvic (2/9/2023); IR angiogram inferior epigastric (2/9/2023); IR venogram lower extremity (Left, 12/11/2023); and Invasive Vascular Procedure (N/A, 4/15/2024).      Social History:  She reports that she has never smoked. She has been exposed to tobacco smoke. She has never used smokeless tobacco. She reports that she does not currently use alcohol. She reports that she does not use drugs.    Family History:  Family History[1]  Allergies:  Benzonatate, Coconut, Erythromycin-benzoyl peroxide, Fruit flavor, Peach, Pineapple, Pollen extracts, and Sulfamethoxazole-trimethoprim    Review of Systems  All pertinent systems have been reviewed and are negative except for what is stated in the history of present illness.    All other systems have been reviewed and are negative and noncontributory to this patient's current ailments.     Visit Vitals  /74   Pulse 87   Ht 1.626 m (5' 4\")   Wt 79.4 kg (175 lb)   BMI 30.04 kg/m²   OB Status Having periods   Smoking Status Never   BSA 1.89 m²       Objective   Vitals reviewed.   Constitutional:       Appearance: Healthy appearance. Not in distress.   Eyes:      " Conjunctiva/sclera: Conjunctivae normal.   Neck:      Vascular: No JVR. JVD normal.   Pulmonary:      Effort: Pulmonary effort is normal.      Breath sounds: Normal breath sounds. No wheezing. No rhonchi. No rales.   Chest:      Chest wall: Not tender to palpatation.   Cardiovascular:      PMI at left midclavicular line. Normal rate. Regular rhythm. Normal S1. Normal S2.       Murmurs: There is no murmur.      No gallop.  No click. No rub.   Edema:     Peripheral edema absent.   Abdominal:      General: Bowel sounds are normal.      Palpations: Abdomen is soft.      Tenderness: There is no abdominal tenderness.   Musculoskeletal: Normal range of motion.         General: No tenderness. Skin:     General: Skin is warm and dry.   Neurological:      General: No focal deficit present.      Mental Status: Alert and oriented to person, place and time.   Psychiatric:         Attention and Perception: Attention normal.         Mood and Affect: Mood normal.       Assessment/Plan   Diagnoses and all orders for this visit:  AVNRT (AV kat re-entry tachycardia)  - symptoms consistent with what she had previously with AVNRT  - holter negative for avnrt and other arrhythmias   - will monitor for avnrt during stress test  Palpitations  - encouraged her to drink water  - trial electrolyte drink  - trial magnesium  - starting metoprolol   Chest pain, unspecified type  - nothing remarkable on holter  - stress echo due to exertional CP and SOB, concerning for angina   - EKG today is NSR at 88bpm anterior TWI noted on previous EKG   SOB  - see above  - euvolemic     Follow up for stress test    Outpatient Medications:  Current Outpatient Medications   Medication Instructions    acetaminophen (TYLENOL) 650 mg, oral, Every 6 hours PRN    albuterol 90 mcg/actuation inhaler 1-2 puffs, inhalation, Every 4 hours PRN    albuterol 2.5 mg, nebulization, Every 4 hours PRN    budesonide-formoterol (Symbicort) 80-4.5 mcg/actuation inhaler PLEASE  SEE ATTACHED FOR DETAILED DIRECTIONS    cyclobenzaprine (FLEXERIL) 10 mg, oral, 3 times daily PRN    ergocalciferol (VITAMIN D-2) 1,250 mcg, oral, 2 times weekly    famotidine (PEPCID) 20 mg, oral, 2 times daily PRN    gabapentin (NEURONTIN) 300 mg, oral, Nightly    hydrocortisone 2.5 % cream Apply twice daily to affected areas    hydrOXYzine pamoate (Vistaril) 25 mg capsule 1 capsule, Every 8 hours PRN    metoprolol succinate XL (TOPROL XL) 25 mg, oral, Nightly, Do not crush or chew.    mirtazapine (REMERON) 7.5 mg, Nightly    ondansetron ODT (ZOFRAN-ODT) 4 mg, oral, Every 8 hours PRN    polyethylene glycol (GLYCOLAX, MIRALAX) 17 g, oral, Daily, In 8 ounces of juice or water. Can decrease the dose to achieve one bowel movement daily..     Exclusive of any other services or procedures performed, I, Aida TAYLOR, spent 20 minutes in duration for this visit today.  This time consisted of chart review, obtaining history, and/or performing the exam as documented above, as well as, documenting the clinical information for the encounter in the electronic record, discussing treatment options, plans, and/or goals with patient, family, and/or caregiver, refilling medications, updating the electronic record, ordering medicines, lab work, imaging, referrals, and/or procedures as documented above and communicating with other [facility] professionals. I have discussed the results of laboratory, radiology, and cardiology studies with the patient and their family/caregiver.         I reviewed hospital records, holter, labs          [1]   Family History  Problem Relation Name Age of Onset    Uterine cancer Mother      Seizures Mother      Other (cerebrovascular accident) Father      Uterine cancer Sister      Colon cancer Mother's Brother

## 2025-05-30 ENCOUNTER — TELEPHONE (OUTPATIENT)
Dept: ALLERGY | Facility: HOSPITAL | Age: 35
End: 2025-05-30

## 2025-06-04 ENCOUNTER — HOSPITAL ENCOUNTER (OUTPATIENT)
Facility: HOSPITAL | Age: 35
Setting detail: OUTPATIENT SURGERY
Discharge: HOME | End: 2025-06-04
Attending: STUDENT IN AN ORGANIZED HEALTH CARE EDUCATION/TRAINING PROGRAM | Admitting: STUDENT IN AN ORGANIZED HEALTH CARE EDUCATION/TRAINING PROGRAM
Payer: MEDICARE

## 2025-06-04 ENCOUNTER — ANESTHESIA EVENT (OUTPATIENT)
Dept: OPERATING ROOM | Facility: HOSPITAL | Age: 35
End: 2025-06-04
Payer: MEDICARE

## 2025-06-04 ENCOUNTER — ANESTHESIA (OUTPATIENT)
Dept: OPERATING ROOM | Facility: HOSPITAL | Age: 35
End: 2025-06-04
Payer: MEDICARE

## 2025-06-04 VITALS
BODY MASS INDEX: 29.7 KG/M2 | HEIGHT: 64 IN | DIASTOLIC BLOOD PRESSURE: 60 MMHG | OXYGEN SATURATION: 100 % | WEIGHT: 173.94 LBS | TEMPERATURE: 97.2 F | HEART RATE: 66 BPM | RESPIRATION RATE: 16 BRPM | SYSTOLIC BLOOD PRESSURE: 95 MMHG

## 2025-06-04 DIAGNOSIS — N94.0 MITTELSCHMERZ PHENOMENON: Primary | ICD-10-CM

## 2025-06-04 DIAGNOSIS — R10.2 PELVIC PAIN: ICD-10-CM

## 2025-06-04 DIAGNOSIS — M79.18 MYALGIA, OTHER SITE: ICD-10-CM

## 2025-06-04 DIAGNOSIS — Z98.890 POST-OPERATIVE STATE: ICD-10-CM

## 2025-06-04 LAB — PREGNANCY TEST URINE, POC: NEGATIVE

## 2025-06-04 PROCEDURE — 20553 NJX 1/MLT TRIGGER POINTS 3/>: CPT | Performed by: STUDENT IN AN ORGANIZED HEALTH CARE EDUCATION/TRAINING PROGRAM

## 2025-06-04 PROCEDURE — 3600000007 HC OR TIME - EACH INCREMENTAL 1 MINUTE - PROCEDURE LEVEL TWO: Performed by: STUDENT IN AN ORGANIZED HEALTH CARE EDUCATION/TRAINING PROGRAM

## 2025-06-04 PROCEDURE — 2500000004 HC RX 250 GENERAL PHARMACY W/ HCPCS (ALT 636 FOR OP/ED): Performed by: NURSE ANESTHETIST, CERTIFIED REGISTERED

## 2025-06-04 PROCEDURE — 81025 URINE PREGNANCY TEST: CPT | Performed by: STUDENT IN AN ORGANIZED HEALTH CARE EDUCATION/TRAINING PROGRAM

## 2025-06-04 PROCEDURE — 2500000004 HC RX 250 GENERAL PHARMACY W/ HCPCS (ALT 636 FOR OP/ED): Performed by: ANESTHESIOLOGIST ASSISTANT

## 2025-06-04 PROCEDURE — 3700000002 HC GENERAL ANESTHESIA TIME - EACH INCREMENTAL 1 MINUTE: Performed by: STUDENT IN AN ORGANIZED HEALTH CARE EDUCATION/TRAINING PROGRAM

## 2025-06-04 PROCEDURE — 2500000004 HC RX 250 GENERAL PHARMACY W/ HCPCS (ALT 636 FOR OP/ED): Performed by: STUDENT IN AN ORGANIZED HEALTH CARE EDUCATION/TRAINING PROGRAM

## 2025-06-04 PROCEDURE — 3700000001 HC GENERAL ANESTHESIA TIME - INITIAL BASE CHARGE: Performed by: STUDENT IN AN ORGANIZED HEALTH CARE EDUCATION/TRAINING PROGRAM

## 2025-06-04 PROCEDURE — 7100000010 HC PHASE TWO TIME - EACH INCREMENTAL 1 MINUTE: Performed by: STUDENT IN AN ORGANIZED HEALTH CARE EDUCATION/TRAINING PROGRAM

## 2025-06-04 PROCEDURE — 2500000004 HC RX 250 GENERAL PHARMACY W/ HCPCS (ALT 636 FOR OP/ED): Performed by: ANESTHESIOLOGY

## 2025-06-04 PROCEDURE — 7100000002 HC RECOVERY ROOM TIME - EACH INCREMENTAL 1 MINUTE: Performed by: STUDENT IN AN ORGANIZED HEALTH CARE EDUCATION/TRAINING PROGRAM

## 2025-06-04 PROCEDURE — 7100000001 HC RECOVERY ROOM TIME - INITIAL BASE CHARGE: Performed by: STUDENT IN AN ORGANIZED HEALTH CARE EDUCATION/TRAINING PROGRAM

## 2025-06-04 PROCEDURE — 3600000002 HC OR TIME - INITIAL BASE CHARGE - PROCEDURE LEVEL TWO: Performed by: STUDENT IN AN ORGANIZED HEALTH CARE EDUCATION/TRAINING PROGRAM

## 2025-06-04 PROCEDURE — 7100000009 HC PHASE TWO TIME - INITIAL BASE CHARGE: Performed by: STUDENT IN AN ORGANIZED HEALTH CARE EDUCATION/TRAINING PROGRAM

## 2025-06-04 PROCEDURE — 2500000005 HC RX 250 GENERAL PHARMACY W/O HCPCS: Performed by: ANESTHESIOLOGY

## 2025-06-04 RX ORDER — MIDAZOLAM HYDROCHLORIDE 1 MG/ML
INJECTION INTRAMUSCULAR; INTRAVENOUS AS NEEDED
Status: DISCONTINUED | OUTPATIENT
Start: 2025-06-04 | End: 2025-06-04

## 2025-06-04 RX ORDER — OXYCODONE HYDROCHLORIDE 5 MG/1
5 TABLET ORAL EVERY 4 HOURS PRN
Status: DISCONTINUED | OUTPATIENT
Start: 2025-06-04 | End: 2025-06-04 | Stop reason: HOSPADM

## 2025-06-04 RX ORDER — LABETALOL HYDROCHLORIDE 5 MG/ML
5 INJECTION, SOLUTION INTRAVENOUS ONCE AS NEEDED
Status: DISCONTINUED | OUTPATIENT
Start: 2025-06-04 | End: 2025-06-04 | Stop reason: HOSPADM

## 2025-06-04 RX ORDER — LIDOCAINE HYDROCHLORIDE 20 MG/ML
INJECTION, SOLUTION INFILTRATION; PERINEURAL AS NEEDED
Status: DISCONTINUED | OUTPATIENT
Start: 2025-06-04 | End: 2025-06-04

## 2025-06-04 RX ORDER — SODIUM CHLORIDE, SODIUM LACTATE, POTASSIUM CHLORIDE, CALCIUM CHLORIDE 600; 310; 30; 20 MG/100ML; MG/100ML; MG/100ML; MG/100ML
100 INJECTION, SOLUTION INTRAVENOUS CONTINUOUS
Status: DISCONTINUED | OUTPATIENT
Start: 2025-06-04 | End: 2025-06-04 | Stop reason: HOSPADM

## 2025-06-04 RX ORDER — ONDANSETRON HYDROCHLORIDE 2 MG/ML
4 INJECTION, SOLUTION INTRAVENOUS ONCE
Status: DISCONTINUED | OUTPATIENT
Start: 2025-06-04 | End: 2025-06-04 | Stop reason: HOSPADM

## 2025-06-04 RX ORDER — BUPIVACAINE HYDROCHLORIDE 5 MG/ML
INJECTION, SOLUTION PERINEURAL AS NEEDED
Status: DISCONTINUED | OUTPATIENT
Start: 2025-06-04 | End: 2025-06-04 | Stop reason: HOSPADM

## 2025-06-04 RX ORDER — ONDANSETRON HYDROCHLORIDE 2 MG/ML
4 INJECTION, SOLUTION INTRAVENOUS ONCE AS NEEDED
Status: COMPLETED | OUTPATIENT
Start: 2025-06-04 | End: 2025-06-04

## 2025-06-04 RX ORDER — OXYCODONE HYDROCHLORIDE 5 MG/1
5 TABLET ORAL EVERY 6 HOURS PRN
Qty: 5 TABLET | Refills: 0 | Status: SHIPPED | OUTPATIENT
Start: 2025-06-04

## 2025-06-04 RX ORDER — PROPOFOL 10 MG/ML
INJECTION, EMULSION INTRAVENOUS AS NEEDED
Status: DISCONTINUED | OUTPATIENT
Start: 2025-06-04 | End: 2025-06-04

## 2025-06-04 RX ORDER — LIDOCAINE HYDROCHLORIDE 10 MG/ML
0.1 INJECTION, SOLUTION EPIDURAL; INFILTRATION; INTRACAUDAL; PERINEURAL ONCE
Status: DISCONTINUED | OUTPATIENT
Start: 2025-06-04 | End: 2025-06-04 | Stop reason: HOSPADM

## 2025-06-04 RX ORDER — HYDRALAZINE HYDROCHLORIDE 20 MG/ML
5 INJECTION INTRAMUSCULAR; INTRAVENOUS EVERY 30 MIN PRN
Status: DISCONTINUED | OUTPATIENT
Start: 2025-06-04 | End: 2025-06-04 | Stop reason: HOSPADM

## 2025-06-04 RX ORDER — FENTANYL CITRATE 50 UG/ML
INJECTION, SOLUTION INTRAMUSCULAR; INTRAVENOUS AS NEEDED
Status: DISCONTINUED | OUTPATIENT
Start: 2025-06-04 | End: 2025-06-04

## 2025-06-04 RX ORDER — ALBUTEROL SULFATE 0.83 MG/ML
2.5 SOLUTION RESPIRATORY (INHALATION) ONCE AS NEEDED
Status: DISCONTINUED | OUTPATIENT
Start: 2025-06-04 | End: 2025-06-04 | Stop reason: HOSPADM

## 2025-06-04 RX ADMIN — LIDOCAINE HYDROCHLORIDE 100 MG: 20 INJECTION, SOLUTION INFILTRATION; PERINEURAL at 12:56

## 2025-06-04 RX ADMIN — PROPOFOL 30 MG: 10 INJECTION, EMULSION INTRAVENOUS at 12:58

## 2025-06-04 RX ADMIN — SODIUM CHLORIDE, SODIUM LACTATE, POTASSIUM CHLORIDE, AND CALCIUM CHLORIDE: .6; .31; .03; .02 INJECTION, SOLUTION INTRAVENOUS at 12:47

## 2025-06-04 RX ADMIN — Medication 6 L/MIN: at 13:11

## 2025-06-04 RX ADMIN — FENTANYL CITRATE 50 MCG: 50 INJECTION, SOLUTION INTRAMUSCULAR; INTRAVENOUS at 12:56

## 2025-06-04 RX ADMIN — PROPOFOL 30 MG: 10 INJECTION, EMULSION INTRAVENOUS at 12:59

## 2025-06-04 RX ADMIN — ONDANSETRON 4 MG: 2 INJECTION, SOLUTION INTRAMUSCULAR; INTRAVENOUS at 14:08

## 2025-06-04 RX ADMIN — PROPOFOL 50 MG: 10 INJECTION, EMULSION INTRAVENOUS at 12:56

## 2025-06-04 RX ADMIN — MIDAZOLAM HYDROCHLORIDE 2 MG: 1 INJECTION, SOLUTION INTRAMUSCULAR; INTRAVENOUS at 12:47

## 2025-06-04 RX ADMIN — PROPOFOL 100 MCG/KG/MIN: 10 INJECTION, EMULSION INTRAVENOUS at 12:57

## 2025-06-04 ASSESSMENT — PAIN - FUNCTIONAL ASSESSMENT
PAIN_FUNCTIONAL_ASSESSMENT: 0-10
PAIN_FUNCTIONAL_ASSESSMENT: UNABLE TO SELF-REPORT
PAIN_FUNCTIONAL_ASSESSMENT: 0-10
PAIN_FUNCTIONAL_ASSESSMENT: UNABLE TO SELF-REPORT
PAIN_FUNCTIONAL_ASSESSMENT: 0-10

## 2025-06-04 ASSESSMENT — PAIN SCALES - GENERAL
PAINLEVEL_OUTOF10: 0 - NO PAIN

## 2025-06-04 NOTE — H&P
"Preoperative H+P - Minimally Invasive Gynecologic Surgery     History Of Present Illness  Armida Huttonulding 34 y.o. w/ CPP presents for pelvic floor Botox injection and pudendal nerve block.     She has tried:  - Vaginal Valium: moderate relief   - TPI (w/ lidocaine):   - Depo shot: tolerated well  - Progesterone IUD: tolerated well   - Robotic excision of endo w/ negative pathology   - Pain Med referral (Ric[facility]le): started on duloxetine  - GI referral: IBS-C treated w/ Linzess     Last pap: 2023 negative/negative   PMHx: pilonidal cyst (s/p surgical management), depression, a. Flutter s/p albation, asthma  PSHx: pilonidal cyst, robotic excision of endometriosis (pathology negative for endo), laparoscopic myomectomy     Past Medical History  PAST MEDICAL HISTORY:  Medical History[1]    Surgical History  PAST SURGICAL HISTORY:  Surgical History[2]    Physical Exam  Blood pressure 105/64, pulse 76, temperature 36.6 °C (97.9 °F), temperature source Temporal, resp. rate 16, height 1.626 m (5' 4\"), weight 78.9 kg (173 lb 15.1 oz), last menstrual period 05/25/2025, SpO2 98%.   Constitutional:  No acute distress, well-nourished and well-developed  HEENT: EOM grossly intact, MMM, neck supple and with full ROM  Pulm:  Effort normal. No accessory muscle usage.  No respiratory distress.  Neurological:  She is alert and oriented to person place and time.  Skin: Warm, no pallor.  Psychiatric:  She has normal mood and affect.      Assessment/Plan   Proceed w/ surgery as scheduled.        Kelsea Stanton MD           [1]   Past Medical History:  Diagnosis Date    Acute sinusitis, unspecified 02/01/2016    Acute rhinosinusitis    Encounter for initial prescription of contraceptive pills 08/10/2018    Encounter for initial prescription of contraceptive pills    Encounter for removal of intrauterine contraceptive device 03/01/2016    Encounter for IUD removal    Encounter for screening for malignant neoplasm of cervix 11/24/2015    Pap " smear for cervical cancer screening    Myopia, bilateral 2014    Bilateral myopia    Other conditions influencing health status      0    Other specified health status 2017    Contraception    Other specified symptoms and signs involving the digestive system and abdomen 2015    Difficulty swallowing pills    Pain in right shoulder 10/07/2020    Right shoulder pain    Pelvic and perineal pain 2021    Pelvic pain    Personal history of other diseases of the digestive system 2015    History of hemorrhoids    Personal history of other diseases of the female genital tract 08/10/2018    History of vaginal discharge    Personal history of other diseases of the female genital tract 08/10/2018    History of vulvodynia    Personal history of other diseases of the female genital tract 2015    History of metrorrhagia    Personal history of other diseases of the musculoskeletal system and connective tissue 10/13/2022    History of scoliosis    Personal history of other diseases of the respiratory system     History of asthma    Personal history of other specified conditions     H/O shortness of breath    Unspecified astigmatism, bilateral 2014    Astigmatism, bilateral   [2]   Past Surgical History:  Procedure Laterality Date    INVASIVE VASCULAR PROCEDURE N/A 4/15/2024    Procedure: VENOGRAM;  Surgeon: Marianna Ordaz MD;  Location: [facility];  Service: Vascular Surgery;  Laterality: N/A;  R jugular approach, pelvic venogram. 3% STS sclerotherapy    IR ANGIOGRAM INFERIOR EPIGASTRIC  2023    IR ANGIOGRAM INFERIOR EPIGASTRIC 2023 [facility]ICAL [facility]GAC[facility]    IR ANGIOGRAM INFERIOR EPIGASTRIC PELVIC  2023    IR ANGIOGRAM INFERIOR EPIGASTRIC PELVIC 2023 [facility] CLINICAL [facility]GAC[facility]    IR ANGIOGRAM INFERIOR EPIGASTRIC PELVIC  2023    IR ANGIOGRAM INFERIOR EPIGASTRIC PELVIC 2023 [facility] CLINICAL LEGAC[facility]    IR VENOGRAM LOWER EXTREMITY Left 2023    IR VENOGRAM LOWER  EXTREMITY 12/11/2023 Marianna Ordaz MD [facility] ANGIO    OTHER SURGICAL HISTORY  08/18/2020    Removal    OTHER SURGICAL HISTORY  07/31/2015    Surgery Suture Of Gum Laceration

## 2025-06-04 NOTE — ANESTHESIA PREPROCEDURE EVALUATION
Patient: Armida Huttonulding    Procedure Information       Date/Time: 06/04/25 1215    Procedure: Pelvic Floor Botox Injection (Pelvis)    Location: [facility] A OR 08 / Virtual [facility] OR    Surgeons: Kelsea Stanton MD          35 yo F hx AVNRT s/p RFA 2023, controlled asthma.  Has pending stress test but reached out to cardiology and OK to proceed.    Relevant Problems   Cardiac   (+) AVNRT (AV kat re-entry tachycardia)   (+) Elevated cholesterol      Pulmonary   (+) Asthma, intermittent (HHS-HCC)      Neuro   (+) Carpal tunnel syndrome, bilateral upper limbs   (+) Carpal tunnel syndrome, left   (+) Carpal tunnel syndrome, right   (+) Cervical radiculitis   (+) Depression      GI   (+) GERD (gastroesophageal reflux disease)   (+) Irritable bowel syndrome with constipation      Musculoskeletal   (+) Carpal tunnel syndrome, bilateral upper limbs   (+) Carpal tunnel syndrome, left   (+) Carpal tunnel syndrome, right   (+) Chronic left-sided low back pain with left-sided sciatica   (+) Scoliosis      HEENT   (+) Acute sinusitis   (+) Bilateral sensorineural hearing loss   (+) Chronic sinusitis   (+) Seasonal allergies   (+) Sinus headache      ID   (+) Viral URI with cough      GYN   (+) Abnormal uterine bleeding (AUB)   (+) Endometriosis       Clinical information reviewed:   Tobacco  Allergies  Meds   Med Hx  Surg Hx  OB Status  Fam Hx  Soc   Hx        NPO Detail:  NPO/Void Status  Date of Last Liquid: 06/03/25  Time of Last Liquid: 2200  Date of Last Solid: 06/03/25  Time of Last Solid: 2200  Last Intake Type: Clear fluids         Physical Exam    Airway  Mallampati: II  TM distance: >3 FB  Neck ROM: full  Mouth opening: 3 or more finger widths     Cardiovascular Rate: normal     Dental - normal exam     Pulmonary - normal exam   Abdominal            Anesthesia Plan    History of general anesthesia?: yes  History of complications of general anesthesia?: no    ASA 2     general   (General vs MAC)  intravenous  induction   Postoperative pain plan includes opioids.  Anesthetic plan and risks discussed with patient.

## 2025-06-04 NOTE — ANESTHESIA POSTPROCEDURE EVALUATION
Patient: Armida PAREDES Lucille    Procedure Summary       Date: 06/04/25 Room / [facility]    Anesthesia Start: 1247 Anesthesia Stop: 1314    Procedure: Pelvic Floor Botox Injection (Pelvis) Diagnosis:       Pelvic pain      Myalgia, other site      (Pelvic pain [R10.2])      (Myalgia, other site [M79.18])    Surgeons: Kelsea Stanton MD Responsible Provider: Augustus Hanna MD    Anesthesia Type: general ASA Status: 2            Anesthesia Type: general    Vitals Value Taken Time   /72 06/04/25 13:45   Temp 36.2 °C (97.2 °F) 06/04/25 13:45   Pulse 64 06/04/25 13:45   Resp 16 06/04/25 13:45   SpO2 100 % 06/04/25 13:45       Anesthesia Post Evaluation    Patient location during evaluation: PACU  Patient participation: complete - patient participated  Level of consciousness: awake  Pain management: adequate  Airway patency: patent  Cardiovascular status: acceptable  Respiratory status: acceptable  Hydration status: acceptable  Postoperative Nausea and Vomiting: none        No notable events documented.

## 2025-06-04 NOTE — OP NOTE
Pelvic Floor Botox Injection Operative Note     Date: 2025  OR Location: [facility] A OR    Name: Armida Adkins, : 1990, Age: 34 y.o., MRN: [id number], Sex: female    Diagnosis  Pre-op Diagnosis      * Pelvic pain [R10.2]     * Myalgia, other site [M79.18] Post-op Diagnosis     * Pelvic pain [R10.2]     * Myalgia, other site [M79.18]     Procedures  Pelvic Floor Botox Injection   - MN INJECTION SINGLE/MLT TRIGGER POINT 3/> MUSCLES  Pudendal nerve block, bilateral   Pelvic floor Botox injection     Surgeons      * Kelsea Stanton - Primary    Resident/Fellow/Other Assistant:  Surgeons and Role:  * No surgeons found with a matching role *    Staff:   Relief Circulator: Luis Cespedes Scrub: Maryam Hernandezub Person: Akbar  Circulator: Chelsie    Anesthesia Staff: Anesthesiologist: Augustus Hanna MD  CRNA: ALISON Hartmann-CRNA  C-AA: TRA Garner    Procedure Summary  Anesthesia: General  ASA: II  Estimated Blood Loss: 1 mL  Intra-op Medications:   Administrations occurring from 1215 to 1315 on 25:   Medication Name Total Dose   onabotulinumtoxinA (Botox) injection 200 Units   BUPivacaine HCl (Marcaine) 0.5 % (5 mg/mL) injection 10 mL   fentaNYL (Sublimaze) injection 50 mcg/mL 50 mcg   LR bolus Cannot be calculated   lidocaine (Xylocaine) injection 2 % 100 mg   midazolam PF (Versed) injection 1 mg/mL 2 mg   propofol (Diprivan) injection 10 mg/mL 173.12 mg              Anesthesia Record               Intraprocedure I/O Totals       None           Specimen: No specimens collected              Drains and/or Catheters: * None in log *    Tourniquet Times:         Implants:     Findings: Grossly normal external genitalia. Diffuse bilateral pelvic floor spasm     Indications: Armida Adkins is an 34 y.o. female who is having surgery for Pelvic pain [R10.2]  Myalgia, other site [M79.18].     Procedure Details:   Patient was placed in lithotomy position in [facility]. Pelvic floor muscle exam was  performed. The vagina was prepped in the standard fashion. Pudendal block was performed bilaterally with 10cc of Marcaine. A total of 200 units of Botox in 10 cc of normal saline was then injected in bilateral OI and LA muscles.  Post procedure observation for bleeding showed excellent hemostasis. Patient tolerated the procedure well.     I was present for the entire procedure.     Evidence of Infection:   Complications:  None; patient tolerated the procedure well.    Disposition: PACU - hemodynamically stable.  Condition: stable         Kelsea Stanton  Phone Number: [phone]

## 2025-06-05 ENCOUNTER — APPOINTMENT (OUTPATIENT)
Dept: ALLERGY | Facility: CLINIC | Age: 35
End: 2025-06-05
Payer: COMMERCIAL

## 2025-06-05 VITALS
HEIGHT: 64 IN | WEIGHT: 175 LBS | TEMPERATURE: 98.2 F | OXYGEN SATURATION: 95 % | HEART RATE: 83 BPM | SYSTOLIC BLOOD PRESSURE: 114 MMHG | BODY MASS INDEX: 29.88 KG/M2 | DIASTOLIC BLOOD PRESSURE: 77 MMHG

## 2025-06-05 DIAGNOSIS — T78.1XXA POLLEN-FOOD ALLERGY, INITIAL ENCOUNTER: ICD-10-CM

## 2025-06-05 DIAGNOSIS — H10.13 ALLERGIC CONJUNCTIVITIS OF BOTH EYES: ICD-10-CM

## 2025-06-05 DIAGNOSIS — J31.0 CHRONIC RHINITIS: Primary | ICD-10-CM

## 2025-06-05 DIAGNOSIS — T78.1XXA ADVERSE FOOD REACTION, INITIAL ENCOUNTER: ICD-10-CM

## 2025-06-05 DIAGNOSIS — J30.1 SEASONAL ALLERGIC RHINITIS DUE TO POLLEN: ICD-10-CM

## 2025-06-05 PROCEDURE — 95004 PERQ TESTS W/ALRGNC XTRCS: CPT | Performed by: ALLERGY & IMMUNOLOGY

## 2025-06-05 PROCEDURE — 99204 OFFICE O/P NEW MOD 45 MIN: CPT | Performed by: ALLERGY & IMMUNOLOGY

## 2025-06-05 RX ORDER — OLOPATADINE HYDROCHLORIDE 2 MG/ML
1 SOLUTION OPHTHALMIC DAILY PRN
Qty: 2.5 ML | Refills: 5 | Status: SHIPPED | OUTPATIENT
Start: 2025-06-05 | End: 2026-06-05

## 2025-06-05 RX ORDER — MOMETASONE FUROATE MONOHYDRATE 50 UG/1
2 SPRAY, METERED NASAL 2 TIMES DAILY
Qty: 17 G | Refills: 11 | Status: SHIPPED | OUTPATIENT
Start: 2025-06-05 | End: 2026-06-05

## 2025-06-05 RX ORDER — AZELASTINE HYDROCHLORIDE 0.5 MG/ML
1 SOLUTION/ DROPS OPHTHALMIC 2 TIMES DAILY PRN
Qty: 6 ML | Refills: 3 | Status: SHIPPED | OUTPATIENT
Start: 2025-06-05 | End: 2026-06-05

## 2025-06-05 RX ORDER — CETIRIZINE HYDROCHLORIDE 10 MG/1
10 TABLET ORAL DAILY PRN
Qty: 30 TABLET | Refills: 11 | Status: SHIPPED | OUTPATIENT
Start: 2025-06-05 | End: 2026-06-05

## 2025-06-05 ASSESSMENT — ENCOUNTER SYMPTOMS
GASTROINTESTINAL NEGATIVE: 1
EYES NEGATIVE: 1
CONSTITUTIONAL NEGATIVE: 1
CARDIOVASCULAR NEGATIVE: 1
MUSCULOSKELETAL NEGATIVE: 1
RESPIRATORY NEGATIVE: 1
ALLERGIC/IMMUNOLOGIC NEGATIVE: 1
HEMATOLOGIC/LYMPHATIC NEGATIVE: 1

## 2025-06-05 NOTE — PATIENT INSTRUCTIONS
It was nice to meet you today     Your environmental allergy testing today was positive to tree, grass, and weed pollen.  Please see below for environmental allergen avoidance recommendations.      You may use Nasonex (mometasone) 2 sprays each nostril once daily    Technique for nasal spray administration:  First, look slightly down, breathe normally, you do not need to sniff while you spray.  To use the nose spray, place the tip in your nose with the opposite hand (left hand, right side of nose, right hand, left side of nose), and aim or point toward the outside of the nose.  Do not sniff or snort medication afterwards as this can cause most of the medication to be swallowed.  Rather, dab your nose with a tissue if any runs out.    You may use Cetirizine (Zyrtec) 10 mg once daily as needed     You may use Optivar 1 drop each eye as needed for itchy eyes     The oral itching that you have with certain fresh fruits and vegetables is  likely due to oral allergy syndrome (also known as pollen-food cross reactivity).  The risk for systemic reaction is low, and most foods can be tolerated in the cooked form without symptoms.  We have provided you with a list of foods that may be involved in oral allergy syndrome.    We would like to see you in follow up in spring 2026 or  sooner if needed    Environmental Allergy Testing:  Test Results (wheal/flare in mm)    Controls  Controls  Histamine: 5/20  Negative: 0    Trees:  Trees  American Beech: 10/25  [place]: 0  Birch: 5/30  Black [place]: 0  [place]: 5/10  [place]: 0  [place]: 0  Elm: 0  [place]: 0  Maple: 0  [place]: 0  [place]: 0  [place]: 0  White poplar: 0     Grasses:  Grass  [place]: 0  [place]: 0  [place]: 5/15  KORT Grass Mix: 8/20  Sweet Vernal: 8/25    Weeds:  Weeds  Cocklebur: 5/15  Dandelion: 0  English Plantain: 5/20  Goldenrod: 0  Lambs Quarters: 0  Mugwort: 0  Pigweed: 0  Ragweed Mix: 5/10  Russian Thistle: 0  Yellow Dock: 0     Molds:  Molds  Alternaria:  0  Aspergillus: 0  Cladosporium: 0  Helminthosporium: 0  Penicillium: 0  Stemphyllium: 0    Animals/Dust Mite:  Animals  Cat: 0  AP do/10  Ku Do  Mouse: 0  Cockroach: 0  Dust Mite F: 0  Dust Mite P: 0     Food Allergy Test Results  Other Nuts  Coconut: 0/0         Thank you for your visit to the [facility] office.  Part of a successful visit is taking home the information you learned today and using it to make things better.  These take home instructions are to help you, if you have questions or concerns, please contact us.     Please see below for recommendations on environmental allergy control or modification:     Pollen Avoidance--If you are sensitive to pollen, there are a few tips to help limit, but not avoid, exposure.     Minimize outdoor activity between 5am-10am, pollen levels are highest at this time.       Keep car windows closed when traveling.     Close house windows at night, use the air conditioning if necessary.     Check the pollen count to know what pollen is outside (http://aaaai.org/nab).       Limit Cigarette smoke exposure.  Smoking and second hand smoke can irritate the nose and sinuses.  You and the people around you will benefit from avoiding cigarette smoke.

## 2025-06-05 NOTE — PROGRESS NOTES
Armida Adkins presents for initial evaluation today.      Armida Adkins was seen at the request of Lorin Chung * for a chief complaint of concern for allergies; a report with my findings is being sent via written or electronic means to Lorin Chung, * with my recommendations for treatment    She provides the following history:  She states that she gets itchy eyes, runny nose, sneezing, nasal congestion occurring year-round over the past 2 years.  She notes that she is mostly bothered in the spring and fall and with exposure to lavender, smoke, and strong scents/perfumes.  She got  2 new cats within the last 2 years, but does not feel that they bother her.  She takes Benadryl as needed.  She also uses Nasonex intermittently which helps.  He takes hydroxyzine as needed for anxiety, last using it 1 month ago.    She gets nausea when eating fresh peaches, pineapples, apple, and coconut.  She tolerates cooked apples with no problem.    Asthma: She reports of history of asthma diagnosed around age 17.  She uses albuterol as needed.  She last used albuterol 1 month ago.  She notes that she has difficulty breathing with exertion.  She is scheduled to see pulmonary in a week.      Eczema: Denies     Food allergy:     Venom allergy:  Denies     Drug allergy: She reports that Bactrim caused lip swelling and tongue tingling as a teenager     Environmental History:  Type of home:  Apartment  Pets in the house: 2 cats  Mold or moisture in the home: None  Bedroom hira: Carpet  Dust mite covers on bed:  No  Cigarette exposure in the home:  No  Occupation/School: Not employed     Pertinent Allergy/Immunology family history:  Mother with penicillin allergy     Review of Systems   Constitutional: Negative.    HENT: Negative.     Eyes: Negative.    Respiratory: Negative.     Cardiovascular: Negative.    Gastrointestinal: Negative.    Musculoskeletal: Negative.    Skin: Negative.    Allergic/Immunologic:  "Negative.    Hematological: Negative.      Vital signs:  /77   Pulse 83   Temp 36.8 °C (98.2 °F)   Ht 1.626 m (5' 4\")   Wt 79.4 kg (175 lb)   SpO2 95%   BMI 30.04 kg/m²     Physical Exam:  GENERAL: Alert, oriented and in no acute distress.   HEENT: EYES: No conjunctival injection or cobblestoning. Nose: nasal turbinates moderately edematous bilaterally.  There is no mucous stranding, polyps, or blood noted. EARS: Tympanic membranes are clear. MOUTH: moist and pink with no exudates, ulcers, or thrush. NECK: No upper airway stridor noted.       HEART: regular rate and rhythm.       LUNGS: Clear to auscultation bilaterally. No wheezing, rhonchi or rales.        ABDOMEN: Positive bowel sounds, soft, nontender, nondistended.       EXTREMITIES: No clubbing or edema.        NEURO:  Normal affect.  Gait normal.      SKIN: No rash, hives, or angioedema noted    Environmental Allergy Testing:  Test Results (wheal/flare in mm)    Controls  Controls  Histamine:   Negative: 0    Trees:  Trees  American Beech: 10/25  Phill: 0  Birch:   Black [place]: 0  [place]: 5/10  [place]: 0  [place]: 0  Elm: 0  [place]: 0  Maple: 0  [place]: 0  [place]: 0  [place]: 0  White poplar: 0     Grasses:  Grass  B[place]ia: 0  [place]: 0  [place]: 5/15  KORT Grass Mix:   Sweet Vernal:     Weeds:  Weeds  Cocklebur: 5/15  Dandelion: 0  English Plantain:   Goldenrod: 0  Lambs Quarters: 0  Mugwort: 0  Pigweed: 0  Ragweed Mix: 5/10  Russian Thistle: 0  Yellow Dock: 0     Molds:  Molds  Alternaria: 0  Aspergillus: 0  Cladosporium: 0  Helminthosporium: 0  Penicillium: 0  Stemphyllium: 0    Animals/Dust Mite:  Animals  Cat: 0  AP do10  Ku Do  Mouse: 0  Cockroach: 0  Dust Mite F: 0  Dust Mite P: 0       Food Allergy Test Results  Other Nuts  Coconut: 0/0       Impression:  1. Chronic rhinitis    2. Seasonal allergic rhinitis due to pollen    3. Pollen-food allergy, initial encounter    4. Allergic conjunctivitis of " both eyes    5. Adverse food reaction, initial encounter      Assessment and Plan:  Allergic rhinitis, seasonal: She was found to have allergic sensitivity to tree, grass, and weed pollen on aeroallergen skin prick testing today.  We discussed treatments for allergic rhinitis to include avoidance, medication, and allergen immunotherapy. Recommend Nasonex 2 sprays each nostril once daily and may use cetirizine 10 mg once daily as needed.  Allergen immunotherapy is contraindicated as she is currently on beta-blockers.    Allergic conjunctivitis, bilateral:  Prescribed olopatadine 0.2% eyedrops 1 drop each eye daily as needed, not covered by pharmacy, Optivar prescribed instead.    Oral allergy syndrome, initial encounter: We discussed at length today that the oral itching that she has with certain fresh fruits are likely due to oral allergy syndrome (also known as pollen-food cross reactivity).  The risk for systemic reaction is low, and most foods can be tolerated in the cooked form without symptoms.  She was provided with a list of foods that may be involved in oral allergy syndrome.    Adverse reaction to food, initial counter: Coconut allergy skin prick testing was negative today.  May continue to avoid for comfort.    Plan for follow-up in spring 2026 or sooner if needed

## 2025-06-05 NOTE — LETTER
June 5, 2025     Lorin Chung MD  [address]  [address]  [facility] [address]    Patient: Armida Adkins   YOB: 1990   Date of Visit: 6/5/2025       Dear Dr. Lorin Chung MD:    Thank you for referring Armida Adkins to me for evaluation. Below are my notes for this consultation.  If you have questions, please do not hesitate to call me. I look forward to following your patient along with you.       Sincerely,     Princess ADRIAN Akhtar MD      CC: No Recipients  ______________________________________________________________________________________    Armida Adkins presents for initial evaluation today.      Armida Adkins was seen at the request of Lorin Chung, * for a chief complaint of concern for allergies; a report with my findings is being sent via written or electronic means to Lorin Chung, * with my recommendations for treatment    She provides the following history:  She states that she gets itchy eyes, runny nose, sneezing, nasal congestion occurring year-round over the past 2 years.  She notes that she is mostly bothered in the spring and fall and with exposure to lavender, smoke, and strong scents/perfumes.  She got  2 new cats within the last 2 years, but does not feel that they bother her.  She takes Benadryl as needed.  She also uses Nasonex intermittently which helps.  He takes hydroxyzine as needed for anxiety, last using it 1 month ago.    She gets nausea when eating fresh peaches, pineapples, apple, and coconut.  She tolerates cooked apples with no problem.    Asthma: She reports of history of asthma diagnosed around age 17.  She uses albuterol as needed.  She last used albuterol 1 month ago.  She notes that she has difficulty breathing with exertion.  She is scheduled to see pulmonary in a week.      Eczema: Denies     Food allergy:     Venom allergy:  Denies     Drug allergy: She reports that Bactrim caused lip swelling and tongue tingling as  "a teenager     Environmental History:  Type of home:  Apartment  Pets in the house: 2 cats  Mold or moisture in the home: None  Bedroom hira: Carpet  Dust mite covers on bed:  No  Cigarette exposure in the home:  No  Occupation/School: Not employed     Pertinent Allergy/Immunology family history:  Mother with penicillin allergy     Review of Systems   Constitutional: Negative.    HENT: Negative.     Eyes: Negative.    Respiratory: Negative.     Cardiovascular: Negative.    Gastrointestinal: Negative.    Musculoskeletal: Negative.    Skin: Negative.    Allergic/Immunologic: Negative.    Hematological: Negative.      Vital signs:  /77   Pulse 83   Temp 36.8 °C (98.2 °F)   Ht 1.626 m (5' 4\")   Wt 79.4 kg (175 lb)   SpO2 95%   BMI 30.04 kg/m²     Physical Exam:  GENERAL: Alert, oriented and in no acute distress.   HEENT: EYES: No conjunctival injection or cobblestoning. Nose: nasal turbinates moderately edematous bilaterally.  There is no mucous stranding, polyps, or blood noted. EARS: Tympanic membranes are clear. MOUTH: moist and pink with no exudates, ulcers, or thrush. NECK: No upper airway stridor noted.       HEART: regular rate and rhythm.       LUNGS: Clear to auscultation bilaterally. No wheezing, rhonchi or rales.        ABDOMEN: Positive bowel sounds, soft, nontender, nondistended.       EXTREMITIES: No clubbing or edema.        NEURO:  Normal affect.  Gait normal.      SKIN: No rash, hives, or angioedema noted    Environmental Allergy Testing:  Test Results (wheal/flare in mm)    Controls  Controls  Histamine: 5/20  Negative: 0    Trees:  Trees  American Beech: 10/25  Phill: 0  Birch: 5/30  Black [place]: 0  [place]: 5/10  Pfeifer: 0  [place]: 0  Elm: 0  [place]: 0  Maple: 0  [place]: 0  [place]: 0  Reserve: 0  White poplar: 0     Grasses:  Grass  [place]: 0  [place]uda: 0  [place]: 5/15  KORT Grass Mix: 8/20  Sweet Vernal: 8/25    Weeds:  Weeds  Cocklebur: 5/15  Dandelion: 0  English " Plantain:   Goldenrod: 0  [place]: 0  Mugwort: 0  Pigweed: 0  Ragweed Mix: 5/10  Russian Thistle: 0  Yellow Dock: 0     Molds:  Molds  Alternaria: 0  Aspergillus: 0  Cladosporium: 0  Helminthosporium: 0  Penicillium: 0  Stemphyllium: 0    Animals/Dust Mite:  Animals  Cat: 0  AP do/10  Ku Do  Mouse: 0  Cockroach: 0  Dust Mite F: 0  Dust Mite P: 0       Food Allergy Test Results  Other Nuts  Coconut: 0/0       Impression:  1. Chronic rhinitis    2. Seasonal allergic rhinitis due to pollen    3. Pollen-food allergy, initial encounter    4. Allergic conjunctivitis of both eyes    5. Adverse food reaction, initial encounter      Assessment and Plan:  Allergic rhinitis, seasonal: She was found to have allergic sensitivity to tree, grass, and weed pollen on aeroallergen skin prick testing today.  We discussed treatments for allergic rhinitis to include avoidance, medication, and allergen immunotherapy. Recommend Nasonex 2 sprays each nostril once daily and may use cetirizine 10 mg once daily as needed.  Allergen immunotherapy is contraindicated as she is currently on beta-blockers.    Allergic conjunctivitis, bilateral:  Prescribed olopatadine 0.2% eyedrops 1 drop each eye daily as needed, not covered by pharmacy, Optivar prescribed instead.    Oral allergy syndrome, initial encounter: We discussed at length today that the oral itching that she has with certain fresh fruits are likely due to oral allergy syndrome (also known as pollen-food cross reactivity).  The risk for systemic reaction is low, and most foods can be tolerated in the cooked form without symptoms.  She was provided with a list of foods that may be involved in oral allergy syndrome.    Adverse reaction to food, initial counter: Coconut allergy skin prick testing was negative today.  May continue to avoid for comfort.    Plan for follow-up in spring 2026 or sooner if needed

## 2025-06-17 ENCOUNTER — OFFICE VISIT (OUTPATIENT)
Dept: PULMONOLOGY | Facility: CLINIC | Age: 35
End: 2025-06-17
Payer: MEDICARE

## 2025-06-17 VITALS
DIASTOLIC BLOOD PRESSURE: 71 MMHG | RESPIRATION RATE: 18 BRPM | TEMPERATURE: 97.6 F | HEART RATE: 99 BPM | SYSTOLIC BLOOD PRESSURE: 104 MMHG | HEIGHT: 64 IN | OXYGEN SATURATION: 99 % | BODY MASS INDEX: 29.53 KG/M2 | WEIGHT: 173 LBS

## 2025-06-17 DIAGNOSIS — I27.20 PULMONARY HYPERTENSION (MULTI): ICD-10-CM

## 2025-06-17 PROCEDURE — 99212 OFFICE O/P EST SF 10 MIN: CPT | Performed by: INTERNAL MEDICINE

## 2025-06-17 PROCEDURE — 99205 OFFICE O/P NEW HI 60 MIN: CPT | Performed by: INTERNAL MEDICINE

## 2025-06-17 PROCEDURE — 3008F BODY MASS INDEX DOCD: CPT | Performed by: INTERNAL MEDICINE

## 2025-06-17 PROCEDURE — 99202 OFFICE O/P NEW SF 15 MIN: CPT | Performed by: INTERNAL MEDICINE

## 2025-06-17 PROCEDURE — 1036F TOBACCO NON-USER: CPT | Performed by: INTERNAL MEDICINE

## 2025-06-17 RX ORDER — ALBUTEROL SULFATE 0.83 MG/ML
3 SOLUTION RESPIRATORY (INHALATION) ONCE
OUTPATIENT
Start: 2025-06-17 | End: 2025-06-17

## 2025-06-17 RX ORDER — ALBUTEROL SULFATE 90 UG/1
1 INHALANT RESPIRATORY (INHALATION) ONCE
OUTPATIENT
Start: 2025-06-17

## 2025-06-17 ASSESSMENT — ASTHMA QUESTIONNAIRES
QUESTION_1 LAST FOUR WEEKS HOW MUCH OF THE TIME DID YOUR ASTHMA KEEP YOU FROM GETTING AS MUCH DONE AT WORK, SCHOOL OR AT HOME: MOST OF THE TIME
QUESTION_3 LAST FOUR WEEKS HOW OFTEN DID YOUR ASTHMA SYMPTOMS (WHEEZING, COUGHING, SHORTNESS OF BREATH, CHEST TIGHTNESS OR PAIN) WAKE YOU UP AT NIGHT OR EARLIER THAN USUAL IN THE MORNING: NOT AT ALL
QUESTION_5 LAST FOUR WEEKS HOW WOULD YOU RATE YOUR ASTHMA CONTROL: SOMEWHAT CONTROLLED
QUESTION_4 LAST FOUR WEEKS HOW OFTEN HAVE YOU USED YOUR RESCUE INHALER OR NEBULIZER MEDICATION (SUCH AS ALBUTEROL): 1 OR TWO TIMES PER DAY
QUESTION_2 LAST FOUR WEEKS HOW OFTEN HAVE YOU HAD SHORTNESS OF BREATH: MORE THAN ONCE A DAY
ACT_TOTALSCORE: 13

## 2025-06-17 NOTE — PROGRESS NOTES
Department of Medicine  Division of Pulmonary, Critical Care, and Sleep Medicine  Location  [facility], [address]    I was asked by Dieudonne Bustamante APRN*, to evaluate Armida Adkins for pulmonary hypertension. I have independently interviewed and examined the patient in the office and reviewed available records.     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 Lifetime non-smoker. She went to the ER in May 2025 due to unresolving lower right back pain thinking she had a UTI. CTA ruled out P.E. but her pulmonary artery measured 37mm in diameter. Her last CTA was in 2022 and at that time her PA diameter was 27mm.     She began developing worsening shortness of breath approximately 2 years ago.  She has difficulties climbing up and down steps, as well as on flat ground.  She has had multiple near syncopal episodes, especially while showering.  She denies having complete loss consciousness, however.  She was diagnosed with asthma from the age of 18, and is currently on Symbicort and using albuterol daily.  She also has vascular disease and sees an outside vascular surgeon by the name of Dr. Clary Aguilar.  She recently had surgery on her right leg to improve blood flow.        Albuterol use: daily  Previous exacerbations: none   Hospitalizations or intubations: denies  Night symptoms: denies  Triggers: exercise, weed pollen, trees, grass  Previous pulmonary testing: none  Average Peak Flow: none  Previous therapies: Symbicort  Atopy: none  NSAID sensitivity: none  Nasal polyps: none      PMH:  Medical History[1]    PSH:  Surgical History[2]    FHx:  Family History[3]    Social Hx:  Social History[4]    Immunization History:  Immunization History   Administered Date(s) Administered    COVID-19, mRNA, LNP-S, PF, 30 mcg/0.3 mL dose 01/05/2022    Pfizer Purple Cap SARS-CoV-2 04/19/2021, 05/10/2021       Current Medications:  Current Outpatient Medications   Medication  "Instructions    albuterol 90 mcg/actuation inhaler 1-2 puffs, inhalation, Every 4 hours PRN    albuterol 2.5 mg, nebulization, Every 4 hours PRN    azelastine (Optivar) 0.05 % ophthalmic solution 1 drop, Both Eyes, 2 times daily PRN    budesonide-formoterol (Symbicort) 80-4.5 mcg/actuation inhaler PLEASE SEE ATTACHED FOR DETAILED DIRECTIONS    cetirizine (ZYRTEC) 10 mg, oral, Daily PRN    famotidine (PEPCID) 20 mg, oral, 2 times daily PRN    gabapentin (NEURONTIN) 300 mg, oral, Nightly    hydrocortisone 2.5 % cream Apply twice daily to affected areas    metoprolol succinate XL (TOPROL XL) 25 mg, oral, Nightly, Do not crush or chew.    mirtazapine (REMERON) 7.5 mg, Nightly    mometasone (Nasonex) 50 mcg/actuation nasal spray 2 sprays, Each Nostril, 2 times daily    olopatadine (Pataday) 0.2 % ophthalmic solution 1 drop, ophthalmic (eye), Daily PRN    oxyCODONE (ROXICODONE) 5 mg, oral, Every 6 hours PRN        Drug Allergies/Intolerances:  RX Allergies[5]     Review of Systems:  Review of Systems     All other review of systems are negative and/or non-contributory.    Physical Examination:  Vitals:    06/17/25 1348   BP: 104/71   BP Location: Right arm   Patient Position: Sitting   Pulse: 99   Resp: 18   Temp: 36.4 °C (97.6 °F)   TempSrc: Temporal   SpO2: 99%   Weight: 78.5 kg (173 lb)   Height: 1.626 m (5' 4\")        GEN: breathing well  ENT: Mallampati I,   CV: RRR, split S2 sound  LUNGS: good effort, clear bilaterally, no w/r/r  EXT: no edema, cyanosis, clubbing      Exacerbation History      none    Pulmonary Function Test Results     none    Sleep Study     1/8/2025: ALEXIA ruled out    CAT and mMRC     N/A    Peak Flow and ACT     6/17/2025: 13    Chest Radiograph     XR chest 2 views 05/23/2025      Impression  No acute cardiopulmonary process is evident.    MACRO:  None    Signed by: Ed Gallagher 5/23/2025 10:38 AM  Dictation workstation:   [id number]      Chest CT Scan     CT angio chest for pulmonary " "embolism 05/23/2025    FINDINGS:  POTENTIAL LIMITATIONS OF THE STUDY:  None    HEART AND VESSELS:  There is no evidence of pulmonary embolism. There is dilation of the  main pulmonary artery relative to the aorta 37mm.    There is no evidence of aortic dissection or aneurysm.    No pericardial effusion.    MEDIASTINUM AND SILVIO, LOWER NECK AND AXILLA:  Residual thymic tissue of the anterior mediastinum is similar to  prior. No mediastinal or hilar lymphadenopathy.  No axillary  lymphadenopathy.    LUNGS AND AIRWAYS:  Bilateral trace pleural effusions are present posteriorly with  adjacent small regions of compressive atelectasis versus small  infiltrates.    No pneumothorax.    UPPER ABDOMEN:  Included portions of the upper abdomen are unremarkable.    CHEST WALL AND OSSEOUS STRUCTURES:  Thoracic mild S-shaped scoliosis is present. Mild anterior endplate  spurring is present in the midthoracic spine.r    Impression  1.  No evidence of pulmonary embolism.  2. Dilation of the main pulmonary artery which may be seen in the  setting of pulmonary hypertension.  3. Bilateral trace pleural effusions with adjacent lower lobe  compressive atelectasis versus small infiltrates.    CTA chest 10/8/2022:  No acute pulmonary embolus to the segmental level.     No acute abnormality of the chest.     No acute inflammatory process throughout the abdomen or pelvis    PA diameter 27mm    Bronchoscopy     None    Labs     Lab Results   Component Value Date    WBC 10.1 05/23/2025    HGB 12.2 05/23/2025    HCT 37.5 05/23/2025    MCV 80 05/23/2025     05/23/2025     No results found for: \"BNP\"  Lab Results   Component Value Date    EOSABS 0.17 05/23/2025    EOSABS 0.48 07/29/2024     Bicarbonate (mmol/L)   Date Value   05/23/2025 24   03/03/2025 27   12/18/2024 27       Echocardiogram     No results found for this or any previous visit from the past 365 days.         ASSESSMENT & PLAN     Problem List Items Addressed This Visit  "   None  Visit Diagnoses         Pulmonary hypertension (Multi)        Relevant Orders    Complete Pulmonary Function Test Pre/Post Bronchodilator (Spirometry Pre/Post/DLCO/Lung Volumes)    Exhaled Nitric Oxide (FeNO)    Follow Up In Pulmonology             Summary:  34 y.o. female with possible pulmonary HTN. Her pulmonary artery diameter has increased from 27 mm to 37 mm in a 3 year time span.  Over that time, she has developed worsening shortness of breath, and experiences near syncopal episodes.  This is concerning for pulmonary HTN. She is scheduled for a stress echo next week.  I discussed her stress test with her cardiology team (Aida Gimenez) stated they will try to get RV pressure measurements during that time.    Plan:  -Stress echo  -+/- RHC pending RVSP estimates  -PFT due to history of asthma  -ALEXIA ruled out    Follow-up: 2025        Kelley Sauceda DO  Staff Physician - Pulmonary & Critical Care  25 2:02 PM  Office number: [phone]   Fax number:  [phone]          [1]   Past Medical History:  Diagnosis Date    Acute sinusitis, unspecified 2016    Acute rhinosinusitis    Encounter for initial prescription of contraceptive pills 08/10/2018    Encounter for initial prescription of contraceptive pills    Encounter for removal of intrauterine contraceptive device 2016    Encounter for IUD removal    Encounter for screening for malignant neoplasm of cervix 2015    Pap smear for cervical cancer screening    Myopia, bilateral 2014    Bilateral myopia    Other conditions influencing health status      0    Other specified health status 2017    Contraception    Other specified symptoms and signs involving the digestive system and abdomen 2015    Difficulty swallowing pills    Pain in right shoulder 10/07/2020    Right shoulder pain    Pelvic and perineal pain 2021    Pelvic pain    Personal history of other diseases of the digestive system  07/30/2015    History of hemorrhoids    Personal history of other diseases of the female genital tract 08/10/2018    History of vaginal discharge    Personal history of other diseases of the female genital tract 08/10/2018    History of vulvodynia    Personal history of other diseases of the female genital tract 09/11/2015    History of metrorrhagia    Personal history of other diseases of the musculoskeletal system and connective tissue 10/13/2022    History of scoliosis    Personal history of other diseases of the respiratory system     History of asthma    Personal history of other specified conditions     H/O shortness of breath    Unspecified astigmatism, bilateral 12/01/2014    Astigmatism, bilateral   [2]   Past Surgical History:  Procedure Laterality Date    INVASIVE VASCULAR PROCEDURE N/A 4/15/2024    Procedure: VENOGRAM;  Surgeon: Marianna Ordaz MD;  Location: [facility];  Service: Vascular Surgery;  Laterality: N/A;  R jugular approach, pelvic venogram. 3% STS sclerotherapy    IR ANGIOGRAM INFERIOR EPIGASTRIC  2/9/2023    IR ANGIOGRAM INFERIOR EPIGASTRIC 2/9/2023 [facility]    IR ANGIOGRAM INFERIOR EPIGASTRIC PELVIC  2/9/2023    IR ANGIOGRAM INFERIOR EPIGASTRIC PELVIC 2/9/2023 [facility] CL[facility]ICAL [facility]    IR ANGIOGRAM INFERIOR EPIGASTRIC PELVIC  2/9/2023    IR ANGIOGRAM INFERIOR EPIGASTRIC PELVIC 2/9/2023 [facility] CLINICAL [facility]GAC[facility]    IR VENOGRAM LOWER EXTREMITY Left 12/11/2023    IR VENOGRAM LOWER EXTREMITY 12/11/2023 Marianna Ordaz [place] [facility]    OTHER SURGICAL HISTORY  08/18/2020    Removal    OTHER SURGICAL HISTORY  07/31/2015    Surgery Suture Of Gum Laceration   [3]   Family History  Problem Relation Name Age of Onset    Uterine cancer Mother      Seizures Mother      Other (cerebrovascular accident) Father      Uterine cancer Sister      Colon cancer Mother's Brother     [4]   Social History  Socioeconomic History    Marital status:    Tobacco Use    Smoking status: Never     Passive  exposure: Past    Smokeless tobacco: Never   Substance and Sexual Activity    Alcohol use: Not Currently    Drug use: Never   [5]   Allergies  Allergen Reactions    Benzonatate Unknown    Erythromycin-Benzoyl Peroxide Unknown    Fruit Flavor Unknown    Peach Itching    Pineapple Itching    Pollen Extracts Unknown    Sulfamethoxazole-Trimethoprim Swelling

## 2025-06-19 ENCOUNTER — APPOINTMENT (OUTPATIENT)
Dept: OBSTETRICS AND GYNECOLOGY | Facility: CLINIC | Age: 35
End: 2025-06-19
Payer: MEDICARE

## 2025-06-19 VITALS
SYSTOLIC BLOOD PRESSURE: 112 MMHG | DIASTOLIC BLOOD PRESSURE: 72 MMHG | BODY MASS INDEX: 29.71 KG/M2 | WEIGHT: 174 LBS | HEART RATE: 96 BPM | HEIGHT: 64 IN

## 2025-06-19 DIAGNOSIS — M79.18 PAIN OF MUSCLE OF PELVIS: ICD-10-CM

## 2025-06-19 DIAGNOSIS — Z48.89 POSTOPERATIVE VISIT: Primary | ICD-10-CM

## 2025-06-19 DIAGNOSIS — R10.2 PELVIC PAIN: ICD-10-CM

## 2025-06-19 PROCEDURE — 99211 OFF/OP EST MAY X REQ PHY/QHP: CPT | Performed by: STUDENT IN AN ORGANIZED HEALTH CARE EDUCATION/TRAINING PROGRAM

## 2025-06-19 PROCEDURE — 3008F BODY MASS INDEX DOCD: CPT | Performed by: STUDENT IN AN ORGANIZED HEALTH CARE EDUCATION/TRAINING PROGRAM

## 2025-06-19 PROCEDURE — 1036F TOBACCO NON-USER: CPT | Performed by: STUDENT IN AN ORGANIZED HEALTH CARE EDUCATION/TRAINING PROGRAM

## 2025-06-19 ASSESSMENT — ENCOUNTER SYMPTOMS
NEUROLOGICAL NEGATIVE: 0
CONSTITUTIONAL NEGATIVE: 0
PSYCHIATRIC NEGATIVE: 0
GASTROINTESTINAL NEGATIVE: 0
ENDOCRINE NEGATIVE: 0
HEMATOLOGIC/LYMPHATIC NEGATIVE: 0
CARDIOVASCULAR NEGATIVE: 0
ALLERGIC/IMMUNOLOGIC NEGATIVE: 0
RESPIRATORY NEGATIVE: 0
MUSCULOSKELETAL NEGATIVE: 0
EYES NEGATIVE: 0

## 2025-06-19 ASSESSMENT — PAIN SCALES - GENERAL: PAINLEVEL_OUTOF10: 0-NO PAIN

## 2025-06-19 NOTE — PROGRESS NOTES
"Division of Minimally Invasive Gynecologic Surgery  [facility]    Date: 6/19/25 - Gynecology Visit    Armida Adkins is a 34 y.o. status post pelvic floor Botox/pudendal block on 6/4/25.     Overall recovering well, meeting all milestones. Some improvement in pain, though she does still have breakthrough pain symptoms.     Sex is still painful. She did do 5 PFPT sessions without significant improvement.     Her insurance would not cover acupuncture or medical massage.     PMHx, PSHx, SHx, Allergies, and Medications updated in Epic.    ROS: reviewed and negative    PE:/72   Pulse 96   Ht 1.626 m (5' 4\")   Wt 78.9 kg (174 lb)   LMP 05/25/2025   BMI 29.87 kg/m²    Constitutional:  No acute distress  HEENT: EOM grossly intact, MMM, neck supple and with full ROM  Pulm:  Effort normal. No accessory muscle usage.  No respiratory distress.  Neurological:  AAO x 3, appropriate to interview  Skin: Warm, no pallor.  Psychiatric:  normal mood and affect.    Assessment/Plan:     Armida Adkins is a 34 y.o. status post pelvic floor Botox/pudendal block on 6/4/25.   - Recovering well, no concerns  - Plan for repeat Botox injection in 3 months  - Suspect neuropathic component to pain. Recommend follow up w/ Pain Medicine to discuss management options.       Kelsea Stanton MD  Division of Minimally Invasive Gynecologic Surgery  [facility]    "

## 2025-06-23 ENCOUNTER — TELEPHONE (OUTPATIENT)
Dept: CARDIOLOGY | Facility: CLINIC | Age: 35
End: 2025-06-23

## 2025-06-23 ENCOUNTER — APPOINTMENT (OUTPATIENT)
Dept: INTEGRATIVE MEDICINE | Facility: CLINIC | Age: 35
End: 2025-06-23
Payer: MEDICARE

## 2025-06-25 ENCOUNTER — HOSPITAL ENCOUNTER (OUTPATIENT)
Dept: CARDIOLOGY | Facility: CLINIC | Age: 35
Discharge: HOME | End: 2025-06-25
Payer: MEDICARE

## 2025-06-25 ENCOUNTER — DOCUMENTATION (OUTPATIENT)
Dept: PHYSICAL THERAPY | Facility: CLINIC | Age: 35
End: 2025-06-25
Payer: MEDICARE

## 2025-06-25 ENCOUNTER — OFFICE VISIT (OUTPATIENT)
Dept: CARDIOLOGY | Facility: CLINIC | Age: 35
End: 2025-06-25
Payer: MEDICARE

## 2025-06-25 VITALS
HEIGHT: 64 IN | DIASTOLIC BLOOD PRESSURE: 74 MMHG | WEIGHT: 171 LBS | SYSTOLIC BLOOD PRESSURE: 119 MMHG | HEART RATE: 88 BPM | BODY MASS INDEX: 29.19 KG/M2

## 2025-06-25 DIAGNOSIS — R06.09 OTHER FORMS OF DYSPNEA: ICD-10-CM

## 2025-06-25 DIAGNOSIS — I47.19 AVNRT (AV NODAL RE-ENTRY TACHYCARDIA): Primary | ICD-10-CM

## 2025-06-25 DIAGNOSIS — R07.9 CHEST PAIN, UNSPECIFIED TYPE: ICD-10-CM

## 2025-06-25 DIAGNOSIS — R00.2 PALPITATIONS: ICD-10-CM

## 2025-06-25 PROCEDURE — 1036F TOBACCO NON-USER: CPT | Performed by: NURSE PRACTITIONER

## 2025-06-25 PROCEDURE — 99212 OFFICE O/P EST SF 10 MIN: CPT | Mod: 25

## 2025-06-25 PROCEDURE — 93350 STRESS TTE ONLY: CPT | Performed by: INTERNAL MEDICINE

## 2025-06-25 PROCEDURE — 93350 STRESS TTE ONLY: CPT

## 2025-06-25 PROCEDURE — 3008F BODY MASS INDEX DOCD: CPT | Performed by: NURSE PRACTITIONER

## 2025-06-25 PROCEDURE — 93018 CV STRESS TEST I&R ONLY: CPT | Performed by: INTERNAL MEDICINE

## 2025-06-25 PROCEDURE — 93016 CV STRESS TEST SUPVJ ONLY: CPT | Performed by: INTERNAL MEDICINE

## 2025-06-25 PROCEDURE — 99214 OFFICE O/P EST MOD 30 MIN: CPT | Performed by: NURSE PRACTITIONER

## 2025-06-25 NOTE — PROGRESS NOTES
Chief Complaint:   AVNRT      History Of Present Illness:    Armida Adkins is a 34 y.o. 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 Was following with Dr. Turpin. Underwent AVNRT RFA in 2023.     She continues to note exertional SOB which is accompanied by chest pain. Lasts for 15 minutes and resolves. Can occur at rest. Chest pain is a mid-sternal pressure.     Today she c/o exertional chest pain, SOB and tachycardia. States if she climbs the stairs, walks long distances or at times when walking in the grocery store she will develop mid sternal chest pressure. It is always accompanied by SOB and tachycardia. Per smart watch heart rates will become as elevated as 125-135bpm. Symptoms resolve with rest.     These symptoms are identical to what she had prior to AVNRT RFA.     Holter placed at last visit showed rare PVCs, negative for AVNRT. Events correlated with NSR/ST (100bpm).     She does report an increase in stress.     She is here for stress test.    Caffeine: soda   Drinks a lot of soda (ginger ale, 7 up)  Water: not drinking a lot   ETOH: none  Vape: none     2/2023  She was undergoing a venogram by IR, developed a narrow complex tachycardia which was described by treating physician as A flutter and underwent successful DCC. Was admitted ot the Hospital and started on anticoagulation. Evaluated by EP, Dr. Turpin, holter placed. 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       Past Medical History:  She has a past medical history of Acute sinusitis, unspecified (02/01/2016), Encounter for initial prescription of contraceptive pills (08/10/2018), Encounter for removal of intrauterine contraceptive device (03/01/2016), Encounter for screening for malignant neoplasm of cervix (11/24/2015), Myopia, bilateral (12/01/2014), Other conditions influencing health status, Other specified health status (05/23/2017), Other specified symptoms and signs involving the digestive system and abdomen (07/09/2015), Pain in right shoulder  "(10/07/2020), Pelvic and perineal pain (05/28/2021), Personal history of other diseases of the digestive system (07/30/2015), Personal history of other diseases of the female genital tract (08/10/2018), Personal history of other diseases of the female genital tract (08/10/2018), Personal history of other diseases of the female genital tract (09/11/2015), Personal history of other diseases of the musculoskeletal system and connective tissue (10/13/2022), Personal history of other diseases of the respiratory system, Personal history of other specified conditions, and Unspecified astigmatism, bilateral (12/01/2014).    Past Surgical History:  She has a past surgical history that includes Other surgical history (08/18/2020); Other surgical history (07/31/2015); IR angiogram inferior epigastric pelvic (2/9/2023); IR angiogram inferior epigastric pelvic (2/9/2023); IR angiogram inferior epigastric (2/9/2023); IR venogram lower extremity (Left, 12/11/2023); and Invasive Vascular Procedure (N/A, 4/15/2024).      Social History:  She reports that she has never smoked. She has been exposed to tobacco smoke. She has never used smokeless tobacco. She reports that she does not currently use alcohol. She reports that she does not use drugs.    Family History:  Family History[1]  Allergies:  Benzonatate, Erythromycin-benzoyl peroxide, Fruit flavor, Peach, Pineapple, Pollen extracts, and Sulfamethoxazole-trimethoprim    Review of Systems  All pertinent systems have been reviewed and are negative except for what is stated in the history of present illness.    All other systems have been reviewed and are negative and noncontributory to this patient's current ailments.     Visit Vitals  /74   Pulse 88   Ht 1.626 m (5' 4\")   Wt 77.6 kg (171 lb)   LMP 05/25/2025   BMI 29.35 kg/m²   OB Status Having periods   Smoking Status Never   BSA 1.87 m²     Objective   Vitals reviewed.   Constitutional:       Appearance: Healthy appearance. " Not in distress.   Eyes:      Conjunctiva/sclera: Conjunctivae normal.   Neck:      Vascular: No JVR. JVD normal.   Pulmonary:      Effort: Pulmonary effort is normal.      Breath sounds: Normal breath sounds. No wheezing. No rhonchi. No rales.   Chest:      Chest wall: Not tender to palpatation.   Cardiovascular:      PMI at left midclavicular line. Normal rate. Regular rhythm. Normal S1. Normal S2.       Murmurs: There is no murmur.      No gallop.  No click. No rub.   Edema:     Peripheral edema absent.   Abdominal:      General: Bowel sounds are normal.      Palpations: Abdomen is soft.      Tenderness: There is no abdominal tenderness.   Musculoskeletal: Normal range of motion.         General: No tenderness. Skin:     General: Skin is warm and dry.   Neurological:      General: No focal deficit present.      Mental Status: Alert and oriented to person, place and time.   Psychiatric:         Attention and Perception: Attention normal.         Mood and Affect: Mood normal.       Assessment/Plan   Diagnoses and all orders for this visit:  AVNRT (AV kat re-entry tachycardia)  - symptoms consistent with what she had previously with AVNRT  - holter negative for avnrt and other arrhythmias   - no AVNRT on stress test   Palpitations  - encouraged her to drink water  - encouraged electrolyte drink  - continue magnesium  - metoprolol helped some but is fatigued with it. Can cut dose in 1/2. If it is not helping improve symptoms she can stop   Chest pain, unspecified type  - nothing remarkable on holter  - stress echo negative for ischemia  - may be 2/2 ST or stress   SOB  - see above  - euvolemic   - RV pressure normal during stress    Outpatient Medications:  Current Outpatient Medications   Medication Instructions    albuterol 90 mcg/actuation inhaler 1-2 puffs, inhalation, Every 4 hours PRN    albuterol 2.5 mg, nebulization, Every 4 hours PRN    azelastine (Optivar) 0.05 % ophthalmic solution 1 drop, Both Eyes, 2  times daily PRN    budesonide-formoterol (Symbicort) 80-4.5 mcg/actuation inhaler PLEASE SEE ATTACHED FOR DETAILED DIRECTIONS    cetirizine (ZYRTEC) 10 mg, oral, Daily PRN    famotidine (PEPCID) 20 mg, oral, 2 times daily PRN    gabapentin (NEURONTIN) 300 mg, oral, Nightly    metoprolol succinate XL (TOPROL XL) 25 mg, oral, Nightly, Do not crush or chew.    mirtazapine (REMERON) 7.5 mg, Nightly    mometasone (Nasonex) 50 mcg/actuation nasal spray 2 sprays, Each Nostril, 2 times daily    olopatadine (Pataday) 0.2 % ophthalmic solution 1 drop, ophthalmic (eye), Daily PRN     Exclusive of any other services or procedures performed, I, Aida TAYLOR, spent 20 minutes in duration for this visit today.  This time consisted of chart review, obtaining history, and/or performing the exam as documented above, as well as, documenting clinical information for the encounter in the electronic record. In addition to the history, testing, notes, and labs I have noted above; I have reviewed pulm note from 6/17/25 and stress test from today. I personally reviewed EKG, no evidence of AVNRT           [1]   Family History  Problem Relation Name Age of Onset    Uterine cancer Mother      Seizures Mother      Other (cerebrovascular accident) Father      Uterine cancer Sister      Colon cancer Mother's Brother

## 2025-06-25 NOTE — LETTER
June 25, 2025     Kelley Sauceda DO  [address]  [facility] [address]    Patient: Armida Adkins   YOB: 1990   Date of Visit: 6/25/2025       Dear Dr. Kelley Sauceda DO:    Armida's stress test was normal. Her RV pressures were normal as well. Let me know if you have any other questions.    Sincerely,     Aida Gimenez, APRN-CNP      CC: No Recipients  ______________________________________________________________________________________    Chief Complaint:   AVNRT      History Of Present Illness:    Armida Adkins is a 34 y.o. 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 Was following with Dr. Turpin. Underwent AVNRT RFA in 2023.     She continues to note exertional SOB which is accompanied by chest pain. Lasts for 15 minutes and resolves. Can occur at rest. Chest pain is a mid-sternal pressure.     Today she c/o exertional chest pain, SOB and tachycardia. States if she climbs the stairs, walks long distances or at times when walking in the grocery store she will develop mid sternal chest pressure. It is always accompanied by SOB and tachycardia. Per smart watch heart rates will become as elevated as 125-135bpm. Symptoms resolve with rest.     These symptoms are identical to what she had prior to AVNRT RFA.     Holter placed at last visit showed rare PVCs, negative for AVNRT. Events correlated with NSR/ST (100bpm).     She does report an increase in stress.     She is here for stress test.    Caffeine: soda   Drinks a lot of soda (ginger ale, 7 up)  Water: not drinking a lot   ETOH: none  Vape: none     2/2023  She was undergoing a venogram by IR, developed a narrow complex tachycardia which was described by treating physician as A flutter and underwent successful DCC. Was admitted ot the Hospital and started on anticoagulation. Evaluated by EP, Dr. Thal, holter placed. 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       Past Medical History:  She has a past medical history of Acute sinusitis, unspecified (02/01/2016),  Encounter for initial prescription of contraceptive pills (08/10/2018), Encounter for removal of intrauterine contraceptive device (03/01/2016), Encounter for screening for malignant neoplasm of cervix (11/24/2015), Myopia, bilateral (12/01/2014), Other conditions influencing health status, Other specified health status (05/23/2017), Other specified symptoms and signs involving the digestive system and abdomen (07/09/2015), Pain in right shoulder (10/07/2020), Pelvic and perineal pain (05/28/2021), Personal history of other diseases of the digestive system (07/30/2015), Personal history of other diseases of the female genital tract (08/10/2018), Personal history of other diseases of the female genital tract (08/10/2018), Personal history of other diseases of the female genital tract (09/11/2015), Personal history of other diseases of the musculoskeletal system and connective tissue (10/13/2022), Personal history of other diseases of the respiratory system, Personal history of other specified conditions, and Unspecified astigmatism, bilateral (12/01/2014).    Past Surgical History:  She has a past surgical history that includes Other surgical history (08/18/2020); Other surgical history (07/31/2015); IR angiogram inferior epigastric pelvic (2/9/2023); IR angiogram inferior epigastric pelvic (2/9/2023); IR angiogram inferior epigastric (2/9/2023); IR venogram lower extremity (Left, 12/11/2023); and Invasive Vascular Procedure (N/A, 4/15/2024).      Social History:  She reports that she has never smoked. She has been exposed to tobacco smoke. She has never used smokeless tobacco. She reports that she does not currently use alcohol. She reports that she does not use drugs.    Family History:  Family History[1]  Allergies:  Benzonatate, Erythromycin-benzoyl peroxide, Fruit flavor, Peach, Pineapple, Pollen extracts, and Sulfamethoxazole-trimethoprim    Review of Systems  All pertinent systems have been reviewed and are  "negative except for what is stated in the history of present illness.    All other systems have been reviewed and are negative and noncontributory to this patient's current ailments.     Visit Vitals  /74   Pulse 88   Ht 1.626 m (5' 4\")   Wt 77.6 kg (171 lb)   LMP 05/25/2025   BMI 29.35 kg/m²   OB Status Having periods   Smoking Status Never   BSA 1.87 m²     Objective  Vitals reviewed.   Constitutional:       Appearance: Healthy appearance. Not in distress.   Eyes:      Conjunctiva/sclera: Conjunctivae normal.   Neck:      Vascular: No JVR. JVD normal.   Pulmonary:      Effort: Pulmonary effort is normal.      Breath sounds: Normal breath sounds. No wheezing. No rhonchi. No rales.   Chest:      Chest wall: Not tender to palpatation.   Cardiovascular:      PMI at left midclavicular line. Normal rate. Regular rhythm. Normal S1. Normal S2.       Murmurs: There is no murmur.      No gallop.  No click. No rub.   Edema:     Peripheral edema absent.   Abdominal:      General: Bowel sounds are normal.      Palpations: Abdomen is soft.      Tenderness: There is no abdominal tenderness.   Musculoskeletal: Normal range of motion.         General: No tenderness. Skin:     General: Skin is warm and dry.   Neurological:      General: No focal deficit present.      Mental Status: Alert and oriented to person, place and time.   Psychiatric:         Attention and Perception: Attention normal.         Mood and Affect: Mood normal.       Assessment/Plan  Diagnoses and all orders for this visit:  AVNRT (AV kat re-entry tachycardia)  - symptoms consistent with what she had previously with AVNRT  - holter negative for avnrt and other arrhythmias   - no AVNRT on stress test   Palpitations  - encouraged her to drink water  - trial electrolyte drink  - trial magnesium  - metoprolol helped some but is fatigued with it. Can cut dose in 1/2. If it is not helping improve symptoms she can stop   Chest pain, unspecified type  - nothing " remarkable on holter  - stress echo negative for ischemia  - may be 2/2 ST or stress   SOB  - see above  - euvolemic   - RV pressure normal during stress    Outpatient Medications:  Current Outpatient Medications   Medication Instructions   • albuterol 90 mcg/actuation inhaler 1-2 puffs, inhalation, Every 4 hours PRN   • albuterol 2.5 mg, nebulization, Every 4 hours PRN   • azelastine (Optivar) 0.05 % ophthalmic solution 1 drop, Both Eyes, 2 times daily PRN   • budesonide-formoterol (Symbicort) 80-4.5 mcg/actuation inhaler PLEASE SEE ATTACHED FOR DETAILED DIRECTIONS   • cetirizine (ZYRTEC) 10 mg, oral, Daily PRN   • famotidine (PEPCID) 20 mg, oral, 2 times daily PRN   • gabapentin (NEURONTIN) 300 mg, oral, Nightly   • metoprolol succinate XL (TOPROL XL) 25 mg, oral, Nightly, Do not crush or chew.   • mirtazapine (REMERON) 7.5 mg, Nightly   • mometasone (Nasonex) 50 mcg/actuation nasal spray 2 sprays, Each Nostril, 2 times daily   • olopatadine (Pataday) 0.2 % ophthalmic solution 1 drop, ophthalmic (eye), Daily PRN     Exclusive of any other services or procedures performed, I, Aida TAYLOR, spent 20 minutes in duration for this visit today.  This time consisted of chart review, obtaining history, and/or performing the exam as documented above, as well as, documenting clinical information for the encounter in the electronic record. In addition to the history, testing, notes, and labs I have noted above; I have reviewed pulm note from 6/17/25 and stress test from today. I personally reviewed EKG, no evidence of AVNRT           [1]  Family History  Problem Relation Name Age of Onset   • Uterine cancer Mother     • Seizures Mother     • Other (cerebrovascular accident) Father     • Uterine cancer Sister     • Colon cancer Mother's Brother          [1]  Family History  Problem Relation Name Age of Onset   • Uterine cancer Mother     • Seizures Mother     • Other (cerebrovascular accident) Father     • Uterine  cancer Sister     • Colon cancer Mother's Brother

## 2025-06-25 NOTE — PROGRESS NOTES
Physical Therapy    Discharge Summary    Name: Armida Adkins  MRN: [id number]  : 1990  Date: 25    Discharge Summary: PT    Discharge Information:   Date of discharge 25  Date of last visit 3/24/25  Date of evaluation 25  Number of attended visits 3  Referred by Juliet  Referred for LBP    Rehab Discharge Reason:  Lost to follow up. At time of last visit was doing well.

## 2025-06-27 DIAGNOSIS — H10.13 ALLERGIC CONJUNCTIVITIS OF BOTH EYES: ICD-10-CM

## 2025-06-27 DIAGNOSIS — J31.0 CHRONIC RHINITIS: ICD-10-CM

## 2025-06-27 DIAGNOSIS — J30.1 SEASONAL ALLERGIC RHINITIS DUE TO POLLEN: ICD-10-CM

## 2025-06-27 RX ORDER — OLOPATADINE HYDROCHLORIDE 2 MG/ML
1 SOLUTION OPHTHALMIC DAILY PRN
Qty: 7.5 ML | Refills: 3 | Status: SHIPPED | OUTPATIENT
Start: 2025-06-27 | End: 2025-09-25

## 2025-06-30 DIAGNOSIS — R00.2 PALPITATIONS: ICD-10-CM

## 2025-06-30 DIAGNOSIS — R07.9 CHEST PAIN, UNSPECIFIED TYPE: ICD-10-CM

## 2025-07-01 RX ORDER — METOPROLOL SUCCINATE 25 MG/1
25 TABLET, EXTENDED RELEASE ORAL NIGHTLY
Qty: 90 TABLET | Refills: 3 | Status: SHIPPED | OUTPATIENT
Start: 2025-07-01 | End: 2026-07-01

## 2025-07-03 ENCOUNTER — TELEMEDICINE (OUTPATIENT)
Dept: VASCULAR SURGERY | Facility: CLINIC | Age: 35
End: 2025-07-03
Payer: MEDICARE

## 2025-07-03 DIAGNOSIS — R10.2 PELVIC PAIN: Primary | ICD-10-CM

## 2025-07-03 DIAGNOSIS — N94.89 PELVIC CONGESTION SYNDROME: ICD-10-CM

## 2025-07-03 DIAGNOSIS — R10.30 LOWER ABDOMINAL PAIN: ICD-10-CM

## 2025-07-03 PROCEDURE — 1036F TOBACCO NON-USER: CPT | Performed by: SURGERY

## 2025-07-03 PROCEDURE — 99213 OFFICE O/P EST LOW 20 MIN: CPT | Performed by: SURGERY

## 2025-07-03 ASSESSMENT — LIFESTYLE VARIABLES
HOW OFTEN DO YOU HAVE SIX OR MORE DRINKS ON ONE OCCASION: NEVER
HOW MANY STANDARD DRINKS CONTAINING ALCOHOL DO YOU HAVE ON A TYPICAL DAY: PATIENT DOES NOT DRINK
AUDIT-C TOTAL SCORE: 0
SKIP TO QUESTIONS 9-10: 1
HOW OFTEN DO YOU HAVE A DRINK CONTAINING ALCOHOL: NEVER

## 2025-07-03 ASSESSMENT — ENCOUNTER SYMPTOMS
OCCASIONAL FEELINGS OF UNSTEADINESS: 0
DEPRESSION: 0
LOSS OF SENSATION IN FEET: 0

## 2025-07-03 ASSESSMENT — PAIN SCALES - GENERAL: PAINLEVEL_OUTOF10: 8

## 2025-07-03 NOTE — PROGRESS NOTES
F/U REASON: chronic pelvic pain    CURRENT ENCOUNTER:  Armida Adkins is 35 y.o. female here for follow up of chronic pelvic pain.  Has continued deep pelvic pain  States that she has severe vaginal dryness  Recent interventions by Dr. Stanton with pelvic botox and due to have another session in 9/2025  Reviewed imaging and no abnormality identified from venous perspective on my review    Meds:   Current Medications[1]    Allergies:   RX Allergies[2]    ROS:  Review of Systems  otherwise unremarkable    Objective:  Vitals:  There were no vitals filed for this visit.     Exam:  Virtual visit  No distress  Breathing comfortably    Labs:  Lab Results   Component Value Date    WBC 10.1 05/23/2025    WBC 11.0 03/03/2025    WBC 9.2 10/16/2024    HGB 12.2 05/23/2025    HGB 12.9 03/03/2025    HGB 12.0 10/16/2024    HCT 37.5 05/23/2025    HCT 38.0 03/03/2025    HCT 36.5 10/16/2024    MCV 80 05/23/2025    MCV 78 (L) 03/03/2025    MCV 82 10/16/2024     05/23/2025     Lab Results   Component Value Date    CREATININE 0.84 05/23/2025    CREATININE 0.82 03/03/2025    CREATININE 0.96 12/18/2024    BUN 10 05/23/2025    BUN 12 03/03/2025    BUN 12 12/18/2024     05/23/2025     03/03/2025     12/18/2024    K 3.8 05/23/2025    K 4.2 03/03/2025    K 4.2 12/18/2024     05/23/2025     03/03/2025     12/18/2024    CO2 24 05/23/2025    CO2 27 03/03/2025    CO2 27 12/18/2024         Imaging:  Reviewed imaging and no abnormality identified from venous perspective on my review    Assessment & Plan:  Armida Adkins is 35 y.o. female with a history of pelvic/L flank pain who is presents with pelvic pain, intermittent on the left, with L flank pain.      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 Sanchez 2/9/2023. Pt states that she has been experience intermittent left flank pain and pelvic pain for approximately three years The pelvic pain leads to  significant dyspareunia. Pt also experiences left abdominal pain during intercourse that is severe and bloating with abdominal pain during menses. No alleviating or aggravating pain. The pain is intermittent.  Describes it as sharp and severe.  She underwent a venogram which demonstrated a pressure differential between the left renal vein and IVC of 4 mmHg  After the procedure the pt went into afib with RVR and underwent cardioversion, was on Eliquis until May 2023      + left flank pain at random times, no hematuria, + abdominal bloating     12/11/23: ULTRASOUND RIGHT JUGULAR VEIN ACCESS, LEFT RENAL VEIN, BILATERAL ILIOFEMORAL VENOGRAPHY, AND IVUS LEFT GONADAL AND PELVIC VENOGRAM      2/19/24 RIGHT JUGULAR ACCESS WITH FOAM SCLEROTHERAPY, EMBOLIZATION OF GONADAL VEIN with Dr. Marianna Ordaz     4/15/24: Venogram, R jugular approach, pelvic venogram. 3% STS sclerotherapy w/Orlin     Continues to follow up with Dr. Stanton - undergoing pelvic botox  Challenging case and unclear how much durable improvement she is gaining form therapies.     1) will plan to discuss your care with our multidisciplinary group    Marianna Ordaz MD, MHS, RPVI  , [employer]  Director, [facility], [facility] [place]  Co-Director, Vascular Laboratories, [facility] [place]  Division of Vascular Surgery and Endovascular Therapy  [facility]                 [1]   Current Outpatient Medications:     albuterol 2.5 mg /3 mL (0.083 %) nebulizer solution, Take 3 mL (2.5 mg) by nebulization every 4 hours if needed for wheezing., Disp: 75 mL, Rfl: 5    albuterol 90 mcg/actuation inhaler, Inhale 1-2 puffs every 4 hours if needed for shortness of breath., Disp: 18 g, Rfl: 3    azelastine (Optivar) 0.05 % ophthalmic solution, Administer 1 drop into both eyes 2 times a day as needed (itching)., Disp:  6 mL, Rfl: 3    budesonide-formoterol (Symbicort) 80-4.5 mcg/actuation inhaler, PLEASE SEE ATTACHED FOR DETAILED DIRECTIONS, Disp: 8 g, Rfl: 2    cetirizine (ZyrTEC) 10 mg tablet, Take 1 tablet (10 mg) by mouth once daily as needed for allergies., Disp: 30 tablet, Rfl: 11    famotidine (Pepcid) 20 mg tablet, Take 1 tablet (20 mg) by mouth 2 times a day as needed for heartburn., Disp: 60 tablet, Rfl: 5    gabapentin (Neurontin) 300 mg capsule, Take 1 capsule (300 mg) by mouth once daily at bedtime., Disp: 30 capsule, Rfl: 2    metoprolol succinate XL (Toprol-XL) 25 mg 24 hr tablet, TAKE 1 TABLET (25 MG) BY MOUTH ONCE DAILY AT BEDTIME. DO NOT CRUSH OR CHEW., Disp: 90 tablet, Rfl: 3    mirtazapine (Remeron) 7.5 mg tablet, Take 1 tablet (7.5 mg) by mouth once daily at bedtime., Disp: , Rfl:     mometasone (Nasonex) 50 mcg/actuation nasal spray, Administer 2 sprays into each nostril 2 times a day., Disp: 17 g, Rfl: 11    olopatadine (Pataday) 0.2 % ophthalmic solution, Administer 1 drop into affected eye(s) once daily as needed for allergies (itchy eyes)., Disp: 7.5 mL, Rfl: 3  [2]   Allergies  Allergen Reactions    Benzonatate Unknown    Erythromycin-Benzoyl Peroxide Unknown    Fruit Flavor Unknown    Peach Itching    Pineapple Itching    Pollen Extracts Unknown    Sulfamethoxazole-Trimethoprim Swelling

## 2025-07-04 NOTE — PATIENT INSTRUCTIONS
It was a pleasure taking care of you today and appreciate your seeing us at our [facility] and [facility].     Today's plan is as follows:  1) will plan to discuss your care with our multidisciplinary group      Please call the office with any questions at [phone].   You can speak to our secretaries or our clinical nurses for specific questions.   For Vein Center specific questions, you can also call [phone] or email at [email or web address]  If you need coordinating your appointments and testing you can do these at the  or by calling my office shortly after your visit.

## 2025-07-07 ENCOUNTER — APPOINTMENT (OUTPATIENT)
Dept: PAIN MEDICINE | Facility: HOSPITAL | Age: 35
End: 2025-07-07
Payer: MEDICARE

## 2025-07-09 ENCOUNTER — OFFICE VISIT (OUTPATIENT)
Dept: PAIN MEDICINE | Facility: HOSPITAL | Age: 35
End: 2025-07-09
Payer: MEDICARE

## 2025-07-09 DIAGNOSIS — N94.89 PELVIC CONGESTION SYNDROME: ICD-10-CM

## 2025-07-09 DIAGNOSIS — R10.2 FEMALE PELVIC PAIN: Primary | ICD-10-CM

## 2025-07-09 DIAGNOSIS — I87.1 NUTCRACKER PHENOMENON OF RENAL VEIN: ICD-10-CM

## 2025-07-09 DIAGNOSIS — N80.9 ENDOMETRIOSIS: ICD-10-CM

## 2025-07-09 DIAGNOSIS — M79.18 PAIN OF MUSCLE OF PELVIS: ICD-10-CM

## 2025-07-09 PROCEDURE — 99214 OFFICE O/P EST MOD 30 MIN: CPT | Performed by: PAIN MEDICINE

## 2025-07-09 NOTE — PROGRESS NOTES
"Subjective   Patient ID: Armida Adkins is a 35 y.o. 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 She is referred by Dr. Kelsea Murdock in Gynecology due to concern for neuropathic pain.    HPI:   Her pain started originally with menses, roughly 5 years ago. She describes a sharp, stabbing pain deep in her pelvis \"in the area of her uterus.\"  She notes it only was the first couple days of her period intermittently occurring throughout those days, but now it is seemingly random and has become increasingly more frequent. It is now affecting her the majority of the week, several times per day. It lasts for several hours at a time, Massaging her lower abdomen/pelvic area helps the pain. Additionally, in the last 3 years, sex has now become increasingly painful. She notes vaginal penetration is most painful. She also notes having issues with vaginal dryness around the same time; pain is refractory to lubrication. She also notes pelvic cramping sensations unrelated temporally to the pelvic pain. It feels different that her menses cramps, which she no longer has. It causes her to have difficulties urinating fully and emptying her bladder. She has been having issues with bowel movements, specifically only going 2x/week, having constipation with hard stools. Stool softeners have not helped. No pain with defecation. She has numbness and tingling in both legs (L>R) from the knee down through the entire foot. No weakness. No saddle paresthesias or pain. No pain or issues sitting down.    Per chart review, she has tried:  - Vaginal Valium: moderate relief   - TPI (w/ lidocaine)  - Depo shot (no longer receiving)  - Progesterone IUD (now removed)  - Pain Med referral (Richelle): started on duloxetine  - GI referral: IBS-C treated w/ " "Linzess  - PFPT  - Pelvic floor Botox/pudendal nerve block on 6/4/25  ** Insurance won't cover acupuncture nor pelvic massage therapy    PSH: pilonidal cyst surgical removal, robotic excision of endometriosis (pathology negative for endo), laparoscopic myomectomy     She is also followed by Vascular Surgery (Dr. Ordaz) for pelvic vascular congestion 2/2 Nutcracker syndrome. She has undergone sclerotherapy and gonadal vein embolization (02/2024) as well as 3% STS sclerotherapy (04/2024). Per 7/3/25 note, it is unclear whether further interventions are expected as \"discussion with a multidisciplinary group is planned.\"    Of note, she was previously seen by Dr. Goode (last in January 2025) for cervical radiculopathy s/p right-sided C6-7 TFESI.    Review of Systems   13-point ROS done and negative except for HPI.     Current Outpatient Medications   Medication Instructions    albuterol 90 mcg/actuation inhaler 1-2 puffs, inhalation, Every 4 hours PRN    albuterol 2.5 mg, nebulization, Every 4 hours PRN    azelastine (Optivar) 0.05 % ophthalmic solution 1 drop, Both Eyes, 2 times daily PRN    budesonide-formoterol (Symbicort) 80-4.5 mcg/actuation inhaler PLEASE SEE ATTACHED FOR DETAILED DIRECTIONS    cetirizine (ZYRTEC) 10 mg, oral, Daily PRN    famotidine (PEPCID) 20 mg, oral, 2 times daily PRN    gabapentin (NEURONTIN) 300 mg, oral, Nightly    metoprolol succinate XL (TOPROL-XL) 25 mg, oral, Nightly, Do not crush or chew.    mirtazapine (REMERON) 7.5 mg, Nightly    mometasone (Nasonex) 50 mcg/actuation nasal spray 2 sprays, Each Nostril, 2 times daily    olopatadine (Pataday) 0.2 % ophthalmic solution 1 drop, ophthalmic (eye), Daily PRN       Medical History[1]     Surgical History[2]     Family History[3]     RX Allergies[4]     Objective     There were no vitals filed for this visit.     Physical Exam  General: NAD, well groomed, well nourished  Eyes: Non-icteric sclera, EOMI  Ears, Nose, Mouth, and Throat: " External ears and nose appear to be without deformity or rash. No lesions or masses noted. Hearing is grossly intact.   Neck: Trachea midline  Respiratory: Nonlabored breathing   Cardiovascular: trace peripheral edema   Skin: No rashes or open lesions/ulcers identified on skin.    Neurologic:   Cranial nerves grossly intact.   Strength Grossly normal  Sensation: Normal to light touch throughout    Psychiatric: Alert, orientation to person, place, and time. Cooperative.    Imaging personally reviewed and independently interpreted:  5/23/25 CT AP w/o contrast:  1. Various small bilateral pleural effusions are present along with  bibasilar atelectasis or infiltrate.  2. No evidence for nephrolithiasis.  3. Multiple embolic vascular coils are present within the left side  of the retroperitoneum and bilateral within the pelvis, more numerous  along the left side of the pelvis.  4. Thoracic lumbar scoliosis.  5/21/25 Vascular US Abdomen/Pelvis:  Aorta/Common Iliac Arteries/IVC: The inferior vena cava appears patent with no evidence of thrombosis. The bilateral common, external and internal iliac veins are noted to be patent with respirophasic Doppler waveforms. The left renal vein is noted to be patent with respirophasic Doppler waveforms and no evidence of narrowing. The gonadal veins are noted to be patent with no evidence of dilatation. No evidence of reflux noted in the CFV, GSV junction, inferior epigastric and pudendal veins bilaterally.       Assessment/Plan   Armida Adkins is a 35 y.o. 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 Pelvic pain does not appear to be related to pudendal neuralgia.    Plan:  - Hypogastric plexus block  - Consider ganglion impar block as second-line management  - Consider pudendal nerve block is symptoms  are refractory to the above    The patient has failed treatment with : Physical therapy , alternative therapies, injections, have significant limitations in their sleep quality due to the pain, have significant limitations of their quality of life due to the pain, have significant impairments of their activities of daily living (ADLs) due to the pain, and the procedure is medically indicated    We discussed  the risks, benefits and alternatives of the procedure including but not limited to: , Lack of efficacy , Transiently worsening pain , Bleeding, Infection , and Nerve Damage    Follow up: After procedure    The patient was invited to contact us back anytime with any questions or concerns and follow-up with us in the office as needed.     There are no diagnoses linked to this encounter.    This note was generated with the aid of dictation software, there may be typos despite my attempts at proofreading.          [1]   Past Medical History:  Diagnosis Date    Acute sinusitis, unspecified 2016    Acute rhinosinusitis    Asthma     Encounter for initial prescription of contraceptive pills 08/10/2018    Encounter for initial prescription of contraceptive pills    Encounter for removal of intrauterine contraceptive device 2016    Encounter for IUD removal    Encounter for screening for malignant neoplasm of cervix 2015    Pap smear for cervical cancer screening    Low back pain 2004    Myopia, bilateral 2014    Bilateral myopia    Neck pain 2018    Other conditions influencing health status      0    Other specified health status 2017    Contraception    Other specified symptoms and signs involving the digestive system and abdomen 2015    Difficulty swallowing pills    Pain in right shoulder 10/07/2020    Right shoulder pain    Pelvic and perineal pain 2021    Pelvic pain    Personal history of other diseases of the digestive system 2015    History of  hemorrhoids    Personal history of other diseases of the female genital tract 08/10/2018    History of vaginal discharge    Personal history of other diseases of the female genital tract 08/10/2018    History of vulvodynia    Personal history of other diseases of the female genital tract 09/11/2015    History of metrorrhagia    Personal history of other diseases of the musculoskeletal system and connective tissue 10/13/2022    History of scoliosis    Personal history of other diseases of the respiratory system     History of asthma    Personal history of other specified conditions     H/O shortness of breath    Unspecified astigmatism, bilateral 12/01/2014    Astigmatism, bilateral   [2]   Past Surgical History:  Procedure Laterality Date    ENDOMETRIAL ABLATION      INVASIVE VASCULAR PROCEDURE N/A 04/15/2024    Procedure: VENOGRAM;  Surgeon: Marianna Ordaz MD;  Location: [facility];  Service: Vascular Surgery;  Laterality: N/A;  R jugular approach, pelvic venogram. 3% STS sclerotherapy    IR ANGIOGRAM INFERIOR EPIGASTRIC  02/09/2023    IR ANGIOGRAM INFERIOR EPIGASTRIC 2/9/2023 [facility]    IR ANGIOGRAM INFERIOR EPIGASTRIC PELVIC  02/09/2023    IR ANGIOGRAM INFERIOR EPIGASTRIC PELVIC 2/9/2023 [facility]    IR ANGIOGRAM INFERIOR EPIGASTRIC PELVIC  02/09/2023    IR ANGIOGRAM INFERIOR EPIGASTRIC PELVIC 2/9/2023 [facility]    IR VENOGRAM LOWER EXTREMITY Left 12/11/2023    IR VENOGRAM LOWER EXTREMITY 12/11/2023 [place] Ordaz [place] [facility] [place]    OTHER SURGICAL HISTORY  08/18/2020    Removal    OTHER SURGICAL HISTORY  07/31/2015    Surgery Suture Of Gum Laceration    TRIGGER POINT INJECTION  Last year    VASCULAR SURGERY     [3]   Family History  Problem Relation Name Age of Onset    Uterine cancer Mother Vashti Kirkland     Seizures Mother Vashti Kirkland     Cancer Mother Vashti Kirkland     Other (cerebrovascular accident) Father Elian Matamoros Jr.     Cancer Father Elian Matamoros Jr.      Stroke Father Elian Shiloh Parrish.     Uterine cancer Sister      Colon cancer Mother's Brother Guille Kirkland    [4]   Allergies  Allergen Reactions    Benzonatate Unknown    Erythromycin-Benzoyl Peroxide Unknown    Fruit Flavor Unknown    Peach Itching    Pineapple Itching    Pollen Extracts Unknown    Sulfamethoxazole-Trimethoprim Swelling

## 2025-07-11 ENCOUNTER — HOSPITAL ENCOUNTER (OUTPATIENT)
Facility: CLINIC | Age: 35
Discharge: HOME | End: 2025-07-11
Payer: MEDICARE

## 2025-07-11 DIAGNOSIS — I27.20 PULMONARY HYPERTENSION (MULTI): ICD-10-CM

## 2025-07-11 PROCEDURE — 94010 BREATHING CAPACITY TEST: CPT

## 2025-07-11 PROCEDURE — 94729 DIFFUSING CAPACITY: CPT

## 2025-07-11 PROCEDURE — 94727 GAS DIL/WSHOT DETER LNG VOL: CPT

## 2025-07-14 ENCOUNTER — APPOINTMENT (OUTPATIENT)
Dept: ORTHOPEDIC SURGERY | Facility: CLINIC | Age: 35
End: 2025-07-14
Payer: MEDICARE

## 2025-07-14 DIAGNOSIS — M54.12 CERVICAL RADICULOPATHY: Primary | ICD-10-CM

## 2025-07-14 PROCEDURE — G2211 COMPLEX E/M VISIT ADD ON: HCPCS

## 2025-07-14 PROCEDURE — 99214 OFFICE O/P EST MOD 30 MIN: CPT

## 2025-07-14 PROCEDURE — 99212 OFFICE O/P EST SF 10 MIN: CPT

## 2025-07-14 ASSESSMENT — PAIN - FUNCTIONAL ASSESSMENT: PAIN_FUNCTIONAL_ASSESSMENT: NO/DENIES PAIN

## 2025-07-15 NOTE — PROGRESS NOTES
HPI:  Armida Adkins is a 35 y.o. year-old female who presents to the office for follow-up appointment regarding ongoing cervical radicular symptoms on the right.  Her symptoms are mainly in the 4th and 5th digits on the right hand.  She has done physical therapy through .  She tried cervical epidural injections.  She provided relief for only a few hours.  Her symptoms are becoming more more constant.  They are becoming intolerable.  Coming going symptoms on the left.  No issues with balance or coordination, dexterity, fine motor skills.    ROS:  Reviewed on EMR and patient intake sheet.    PMH/SH:  Reviewed on EMR and patient intake sheet.    Exam:  MSK: Full strength of bilateral upper extremity flexion and extension of the arms.  Right shoulder examination demonstrates about 120 degrees of shoulder flexion before pain.  Positive Neer's, empty can, Coulter test on right.  No pain to internal or external rotation against resistance. Negative macedo's, pos spurling's.    General: No acute distress. Awake and conversant.  Eyes: Normal conjunctiva, anicteric. Round symmetric pupils.  ENT: Hearing grossly intact. No nasal discharge.  Neck: Neck is supple. No masses or thyromegaly.  Respiratory: Respirations are non-labored. No wheezing.  Skin: Warm. No rashes or ulcers.  Psych: Alert and oriented. Cooperative, appropriate mood and affect, normal judgement.  CV: No lower extremity edema.  Neuro: Sensation and CN II-XII grossly normal.    Radiology:     MRI of the cervical spine personally reviewed and compared with the radiologist findings which demonstrates right paracentral disc herniation resulting in mild spinal canal stenosis and moderate right neuroforaminal stenosis.    Diagnosis:    Cervical radiculopathy  Cervical disc herniation    Assessment and Plan:  Patient was seen and evaluated for ongoing cervical radicular symptoms and a C6/7 distribution on the right.  This has persisted despite multiple  treatment allergies such as physical therapy, pain management injections, and gabapentin.  At this point, her symptoms are becoming intolerable and she is pursuing surgical intervention.  She would need a C6/7 ACDF. The patient is not a smoker.    We discussed surgery today at length, including the specifics of the actual proposed procedure, the projected hospital course and post-operative recovery or rehabilitation, and the expected results of this surgery. The potential benefits and risks of the proposed surgery include, but are not limited to, those of infection, spinal fluid leaks, nerve injury or paralysis, whether that be complete or partial paralysis, C5 palsy, dysphagia, hoarseness or vocal cord paralysis, instrumentation failure, non-union, future adjacent segment disease, epidural hematoma, wound dehiscence, esophageal injury, vascular injury, and the general risks of anaesthesia including, but not limited to those of stroke, heart attack, respiratory difficulties and death. The patient understands that there are no guarantees in regard to outcome or potential complications associated with the proposed procedure. After this discussion, the patient articulated understanding of the topics covered and felt that all questions had been covered and answered satisfactorily.     Patient feels all questions were answered today.  Patient agrees to the plan above.  The patient would like to proceed.    Addendum: after reviewing the patient's chart, no evidence of a discharge note from PT or 6 weeks of PT were seen. Patient should provide our office with these notes before moving forward with surgical intervention. She would likely also need an updated cervical MRI as the previous MRI was dated 8/6/24.    **This note was dictated using speech recognition software and was not corrected for spelling or grammatical errors**    Jay Huerta PA-C  Department of Orthopaedic Surgery  1:03 PM  08/04/25    [id number]  [place] [id number]    Voicemail: [phone]   Appts: [phone]  Fax: [phone]

## 2025-07-18 ENCOUNTER — OFFICE VISIT (OUTPATIENT)
Dept: PULMONOLOGY | Facility: CLINIC | Age: 35
End: 2025-07-18
Payer: MEDICARE

## 2025-07-18 VITALS
BODY MASS INDEX: 29.02 KG/M2 | OXYGEN SATURATION: 98 % | SYSTOLIC BLOOD PRESSURE: 117 MMHG | RESPIRATION RATE: 18 BRPM | WEIGHT: 170 LBS | HEART RATE: 84 BPM | TEMPERATURE: 97.8 F | HEIGHT: 64 IN | DIASTOLIC BLOOD PRESSURE: 75 MMHG

## 2025-07-18 DIAGNOSIS — J45.20 MILD INTERMITTENT ASTHMA WITHOUT COMPLICATION (HHS-HCC): ICD-10-CM

## 2025-07-18 DIAGNOSIS — I27.20 PULMONARY HYPERTENSION (MULTI): Primary | ICD-10-CM

## 2025-07-18 PROCEDURE — 99215 OFFICE O/P EST HI 40 MIN: CPT | Performed by: INTERNAL MEDICINE

## 2025-07-18 PROCEDURE — 3008F BODY MASS INDEX DOCD: CPT | Performed by: INTERNAL MEDICINE

## 2025-07-18 PROCEDURE — 99202 OFFICE O/P NEW SF 15 MIN: CPT | Performed by: INTERNAL MEDICINE

## 2025-07-18 PROCEDURE — 1036F TOBACCO NON-USER: CPT | Performed by: INTERNAL MEDICINE

## 2025-07-18 ASSESSMENT — ASTHMA QUESTIONNAIRES
QUESTION_4 LAST FOUR WEEKS HOW OFTEN HAVE YOU USED YOUR RESCUE INHALER OR NEBULIZER MEDICATION (SUCH AS ALBUTEROL): 1 OR TWO TIMES PER DAY
QUESTION_2 LAST FOUR WEEKS HOW OFTEN HAVE YOU HAD SHORTNESS OF BREATH: MORE THAN ONCE A DAY
QUESTION_5 LAST FOUR WEEKS HOW WOULD YOU RATE YOUR ASTHMA CONTROL: WELL CONTROLLED
QUESTION_3 LAST FOUR WEEKS HOW OFTEN DID YOUR ASTHMA SYMPTOMS (WHEEZING, COUGHING, SHORTNESS OF BREATH, CHEST TIGHTNESS OR PAIN) WAKE YOU UP AT NIGHT OR EARLIER THAN USUAL IN THE MORNING: NOT AT ALL
ACT_TOTALSCORE: 15
QUESTION_1 LAST FOUR WEEKS HOW MUCH OF THE TIME DID YOUR ASTHMA KEEP YOU FROM GETTING AS MUCH DONE AT WORK, SCHOOL OR AT HOME: SOME OF THE TIME

## 2025-07-18 ASSESSMENT — ENCOUNTER SYMPTOMS
SHORTNESS OF BREATH: 1
WHEEZING: 0
COUGH: 0

## 2025-07-18 NOTE — PROGRESS NOTES
Department of Medicine  Division of Pulmonary, Critical Care, and Sleep Medicine  Follow Up  [facility], [address]    Armida Adkins is a 35 y.o. female who returns as a follow up for pulmonary artery dilation. Last visit was on 6/17/2025.    Physician HPI (7/18/2025):  Since last visit, she underwent stress echo which ruled out any ischemic changes.  Her RVSP was recorded at 18.8 mmHg. She denies any more episodes of near syncope.  She does have issues with losing her balance while walking.  She is also chronically dyspneic, and realizes that she has to walk at a slower pace.  She is compliant with her Symbicort.  She denies any oral ulcers or skin ulceration with trauma.  She denies any hyperflexibility of her joints.  She would like a second opinion regarding her findings prior to proceeding for right heart catheterization.    Of note, she had embolization of her right gonadal vein with foam sclerotherapy in February 2024.  Access was obtained through right jugular vein.    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 Lifetime non-smoker. She went to the ER in May 2025 due to unresolving lower right back pain thinking she had a UTI. CTA ruled out P.E. but her pulmonary artery measured 37mm in diameter. Her last CTA was in 2022 and at that time her PA diameter was 27mm.      She began developing worsening shortness of breath approximately 2 years ago.  She has difficulties climbing up and down steps, as well as on flat ground.  She has had multiple near syncopal episodes, especially while showering.  She denies having complete loss consciousness, however.  She was diagnosed with asthma from the age of 18, and is currently on Symbicort and using albuterol daily.  She also has vascular disease and sees an outside vascular surgeon by the name of Dr. Clary Aguilar.  She recently had surgery on her right leg to improve blood flow.          Albuterol use:  daily  Previous exacerbations: none   Hospitalizations or intubations: denies  Night symptoms: denies  Triggers: exercise, weed pollen, trees, grass  Previous pulmonary testing: none  Average Peak Flow: none  Previous therapies: Symbicort  Atopy: none  NSAID sensitivity: none  Nasal polyps: none    I have personally reviewed PMH, PSH, Family History in the HISTORY tab of this chart, and unless noted above are not pertinent to the presenting complaint.  I have personally reviewed Social History as provided in the electronic medical record.    Immunization History:  Immunization History   Administered Date(s) Administered    COVID-19, mRNA, LNP-S, PF, 30 mcg/0.3 mL dose 01/05/2022    Pfizer Purple Cap SARS-CoV-2 04/19/2021, 05/10/2021       Current Medications:  Current Outpatient Medications   Medication Instructions    albuterol 90 mcg/actuation inhaler 1-2 puffs, inhalation, Every 4 hours PRN    albuterol 2.5 mg, nebulization, Every 4 hours PRN    azelastine (Optivar) 0.05 % ophthalmic solution 1 drop, Both Eyes, 2 times daily PRN    budesonide-formoterol (Symbicort) 80-4.5 mcg/actuation inhaler PLEASE SEE ATTACHED FOR DETAILED DIRECTIONS    cetirizine (ZYRTEC) 10 mg, oral, Daily PRN    famotidine (PEPCID) 20 mg, oral, 2 times daily PRN    gabapentin (NEURONTIN) 300 mg, oral, Nightly    metoprolol succinate XL (TOPROL-XL) 25 mg, oral, Nightly, Do not crush or chew.    mirtazapine (REMERON) 7.5 mg, Nightly    mometasone (Nasonex) 50 mcg/actuation nasal spray 2 sprays, Each Nostril, 2 times daily    olopatadine (Pataday) 0.2 % ophthalmic solution 1 drop, ophthalmic (eye), Daily PRN        Drug Allergies/Intolerances:  RX Allergies[1]     Review of Systems:  Review of Systems   Respiratory:  Positive for shortness of breath. Negative for cough and wheezing.    Cardiovascular:  Negative for chest pain and leg swelling.   Neurological:         Gait instability        All other review of systems are negative and/or  "non-contributory.    Physical Examination:  Vitals:    25 1305   BP: 117/75   BP Location: Right arm   Patient Position: Sitting   Pulse: 84   Resp: 18   Temp: 36.6 °C (97.8 °F)   TempSrc: Temporal   SpO2: 98%   Weight: 77.1 kg (170 lb)   Height: 1.626 m (5' 4\")          GEN: appears well  ENT: Mallampati I,   CV: RRR, no m/g/r. Split S2. No appreciable JVD  LUNGS: good effort, clear bilaterally, no w/r/r  EXT: no edema, cyanosis, clubbing      Exacerbation History     None    Pulmonary Function Test Results     2025:  FVC: 3.16 L (98%)  FEV1: 2.49 L (91%)  FEV1/FVC: 0.79  T.79 L (105%)  DLCO: 108%  FeNO: 6    O2 Requirements     None    Sleep Study     2025: ALEXIA ruled out     CAT score     25: N/A    Peak Flow and ACT     25:15  2025: 13    Chest Radiograph     XR chest 2 views 2025        Impression  No acute cardiopulmonary process is evident.     MACRO:  None    Chest CT Scan     CT angio chest for pulmonary embolism 2025     FINDINGS:  POTENTIAL LIMITATIONS OF THE STUDY:  None     HEART AND VESSELS:  There is no evidence of pulmonary embolism. There is dilation of the  main pulmonary artery relative to the aorta 37mm.     There is no evidence of aortic dissection or aneurysm.     No pericardial effusion.     MEDIASTINUM AND SILVIO, LOWER NECK AND AXILLA:  Residual thymic tissue of the anterior mediastinum is similar to  prior. No mediastinal or hilar lymphadenopathy.  No axillary  lymphadenopathy.     LUNGS AND AIRWAYS:  Bilateral trace pleural effusions are present posteriorly with  adjacent small regions of compressive atelectasis versus small  infiltrates.     No pneumothorax.     UPPER ABDOMEN:  Included portions of the upper abdomen are unremarkable.     CHEST WALL AND OSSEOUS STRUCTURES:  Thoracic mild S-shaped scoliosis is present. Mild anterior endplate  spurring is present in the midthoracic spine.r     Impression  1.  No evidence of pulmonary " "embolism.  2. Dilation of the main pulmonary artery which may be seen in the  setting of pulmonary hypertension.  3. Bilateral trace pleural effusions with adjacent lower lobe  compressive atelectasis versus small infiltrates.     CTA chest 10/8/2022:  No acute pulmonary embolus to the segmental level.     No acute abnormality of the chest.     No acute inflammatory process throughout the abdomen or pelvis     PA diameter 27mm    Bronchoscopy     None    Labs     Lab Results   Component Value Date    WBC 10.1 05/23/2025    HGB 12.2 05/23/2025    HCT 37.5 05/23/2025    MCV 80 05/23/2025     05/23/2025     No results found for: \"BNP\"  Lab Results   Component Value Date    EOSABS 0.17 05/23/2025    EOSABS 0.48 07/29/2024     Bicarbonate (mmol/L)   Date Value   05/23/2025 24   03/03/2025 27   12/18/2024 27       Echocardiogram     Echocardiogram Exercise Stress Test 06/25/2025    Baseline ECG: Resting ECG showed normal sinus rhythm with normal tracing.    Stress ECG: Stress ECG showed normal sinus rhythm, with no abnormal findings. No ST changes.    Recovery ECG: Recovery ECG showed normal sinus rhythm, with no abnormal findings. The heart rate recovery was normal.    Baseline Echo: LV septal wall thickness is normal. LV posterior wall thickness is normal. There are no regional wall motion abnormalities at baseline. Spectral Doppler shows normal pattern of LV diastolic filling.    Stress Echo: There are no stress induced regional wall motion abnormalities. The ejection fraction is approximately 70 to 75% at peak stress.    Aortic Valve Findings: The patient has no aortic insufficiency at rest.    Mitral Valve Findings: The patient has no mitral regurgitation at rest.    Right Ventricular Systolic Pressure Estimate: The patient has trivial tricuspid regurgitation at rest.    Aortic Valve:  Resting  AoV Vmax:      1.32 m/s  AoV Peak Grad: 7.0 mmHg        Tricuspid Valve:  Resting  TR Vmax:      2.10 m/s  TR Peak " Grad: 13.8 mmHg  RVSP:         18.8 mmHg        Summary:  1. Trace tricuspid regurgitation at rest.  2. Adequate level of stress achieved.  3. No clinical, echocardiographic or electrocardiographic evidence for ischemia at a maximal workload.  4. Normal ECG.  5. Normal Stress Test.       ASSESSMENT & PLAN     Problem List Items Addressed This Visit       Asthma, intermittent (HHS-HCC)     Other Visit Diagnoses         Pulmonary hypertension (Multi)    -  Primary    Relevant Orders    Referral to Pulmonology    Follow Up In Pulmonology             SUMMARY:  35 y.o. female with possible pulmonary HTN. Her pulmonary artery diameter has increased from 27 mm to 37 mm in a 3-year time span.  Over that interval, she has developed worsening shortness of breath, and experiences near syncopal episodes.  Her exertional dyspnea persists. PFT is normal. Stress echo normal with RVSP estimated at 18mmHg. She had her right jugular vein instrumented for a gonadal vein embolization over 1 year ago -- perhaps this somehow contributed to her enlarged pulmonary artery?    Her mild asthma is under good control and she is not in any acute exacerbation.    Patient was informed that pressures on echo could be erroneously underestimated. Next step would be for RHC, however, patient would prefer second opinion from PH clinic. She does not have any history or physical exam findings to suggest Behcet disease or Marfan Syndrome.     Plan:  -Will appreciate input from Dr. Talley  -+/- RHC    Follow-up: 10/24/2025        Kelley Sauceda DO  Staff Physician - Pulmonary & Critical Care  07/18/25 1:07 PM  Office number: [phone]   Fax number:  [phone]          [1]   Allergies  Allergen Reactions    Benzonatate Unknown    Erythromycin-Benzoyl Peroxide Unknown    Fruit Flavor Unknown    Peach Itching    Pineapple Itching    Pollen Extracts Unknown    Sulfamethoxazole-Trimethoprim Swelling

## 2025-07-24 ENCOUNTER — HOSPITAL ENCOUNTER (OUTPATIENT)
Facility: HOSPITAL | Age: 35
Discharge: HOME | End: 2025-07-24
Payer: MEDICARE

## 2025-07-24 VITALS
RESPIRATION RATE: 16 BRPM | TEMPERATURE: 97.5 F | HEART RATE: 82 BPM | DIASTOLIC BLOOD PRESSURE: 79 MMHG | OXYGEN SATURATION: 99 % | SYSTOLIC BLOOD PRESSURE: 117 MMHG

## 2025-07-24 DIAGNOSIS — R10.2 FEMALE PELVIC PAIN: ICD-10-CM

## 2025-07-24 DIAGNOSIS — N94.89 PELVIC CONGESTION SYNDROME: ICD-10-CM

## 2025-07-24 DIAGNOSIS — M79.18 PAIN OF MUSCLE OF PELVIS: ICD-10-CM

## 2025-07-24 DIAGNOSIS — I87.1 NUTCRACKER PHENOMENON OF RENAL VEIN: ICD-10-CM

## 2025-07-24 DIAGNOSIS — N80.9 ENDOMETRIOSIS: ICD-10-CM

## 2025-07-24 PROCEDURE — 64517 N BLOCK INJ HYPOGAS PLXS: CPT | Performed by: PAIN MEDICINE

## 2025-07-24 PROCEDURE — 7100000010 HC PHASE TWO TIME - EACH INCREMENTAL 1 MINUTE

## 2025-07-24 PROCEDURE — 2500000004 HC RX 250 GENERAL PHARMACY W/ HCPCS (ALT 636 FOR OP/ED): Performed by: PAIN MEDICINE

## 2025-07-24 PROCEDURE — 7100000009 HC PHASE TWO TIME - INITIAL BASE CHARGE

## 2025-07-24 PROCEDURE — 2500000004 HC RX 250 GENERAL PHARMACY W/ HCPCS (ALT 636 FOR OP/ED): Performed by: STUDENT IN AN ORGANIZED HEALTH CARE EDUCATION/TRAINING PROGRAM

## 2025-07-24 PROCEDURE — 3700000012 HC SEDATION LEVEL 5+ TIME - INITIAL 15 MINUTES 5/> YEARS

## 2025-07-24 PROCEDURE — 2550000001 HC RX 255 CONTRASTS: Performed by: PAIN MEDICINE

## 2025-07-24 RX ORDER — FENTANYL CITRATE 50 UG/ML
INJECTION, SOLUTION INTRAMUSCULAR; INTRAVENOUS AS NEEDED
Status: COMPLETED | OUTPATIENT
Start: 2025-07-24 | End: 2025-07-24

## 2025-07-24 RX ORDER — MIDAZOLAM HYDROCHLORIDE 1 MG/ML
INJECTION, SOLUTION INTRAMUSCULAR; INTRAVENOUS AS NEEDED
Status: COMPLETED | OUTPATIENT
Start: 2025-07-24 | End: 2025-07-24

## 2025-07-24 RX ORDER — SODIUM CHLORIDE, SODIUM LACTATE, POTASSIUM CHLORIDE, CALCIUM CHLORIDE 600; 310; 30; 20 MG/100ML; MG/100ML; MG/100ML; MG/100ML
30 INJECTION, SOLUTION INTRAVENOUS ONCE
Status: COMPLETED | OUTPATIENT
Start: 2025-07-24 | End: 2025-07-24

## 2025-07-24 RX ORDER — BUPIVACAINE HYDROCHLORIDE 5 MG/ML
INJECTION, SOLUTION EPIDURAL; INTRACAUDAL; PERINEURAL AS NEEDED
Status: COMPLETED | OUTPATIENT
Start: 2025-07-24 | End: 2025-07-24

## 2025-07-24 RX ORDER — BUPIVACAINE HYDROCHLORIDE 2.5 MG/ML
INJECTION, SOLUTION EPIDURAL; INFILTRATION; INTRACAUDAL; PERINEURAL AS NEEDED
Status: COMPLETED | OUTPATIENT
Start: 2025-07-24 | End: 2025-07-24

## 2025-07-24 RX ADMIN — IOHEXOL 1 ML: 240 INJECTION, SOLUTION INTRATHECAL; INTRAVASCULAR; INTRAVENOUS; ORAL at 13:22

## 2025-07-24 RX ADMIN — FENTANYL CITRATE 100 MCG: 50 INJECTION, SOLUTION INTRAMUSCULAR; INTRAVENOUS at 13:11

## 2025-07-24 RX ADMIN — SODIUM CHLORIDE, SODIUM LACTATE, POTASSIUM CHLORIDE, AND CALCIUM CHLORIDE 30 ML/HR: 600; 310; 30; 20 INJECTION, SOLUTION INTRAVENOUS at 13:02

## 2025-07-24 RX ADMIN — MIDAZOLAM 2 MG: 1 INJECTION INTRAMUSCULAR; INTRAVENOUS at 13:11

## 2025-07-24 RX ADMIN — BUPIVACAINE HYDROCHLORIDE 6 ML: 5 INJECTION, SOLUTION EPIDURAL; INTRACAUDAL; PERINEURAL at 13:21

## 2025-07-24 RX ADMIN — BUPIVACAINE HYDROCHLORIDE 6 ML: 2.5 INJECTION, SOLUTION EPIDURAL; INFILTRATION; INTRACAUDAL; PERINEURAL at 13:22

## 2025-07-24 ASSESSMENT — PAIN SCALES - GENERAL
PAINLEVEL_OUTOF10: 0 - NO PAIN
PAINLEVEL_OUTOF10: 5 - MODERATE PAIN
PAINLEVEL_OUTOF10: 7
PAINLEVEL_OUTOF10: 2
PAINLEVEL_OUTOF10: 5 - MODERATE PAIN

## 2025-07-24 ASSESSMENT — PAIN - FUNCTIONAL ASSESSMENT
PAIN_FUNCTIONAL_ASSESSMENT: WONG-BAKER FACES
PAIN_FUNCTIONAL_ASSESSMENT: 0-10
PAIN_FUNCTIONAL_ASSESSMENT: WONG-BAKER FACES
PAIN_FUNCTIONAL_ASSESSMENT: 0-10
PAIN_FUNCTIONAL_ASSESSMENT: 0-10

## 2025-07-24 NOTE — DISCHARGE INSTRUCTIONS
DISCHARGE INSTRUCTIONS FOR INJECTIONS     After most injections, it is recommended that you relax and limit your activity for the remainder of the day unless you have been told otherwise by your pain physician.  You should not drive a car, operate machinery, or make important legal decisions unless otherwise directed by your pain physician.  You may resume your normal activity, including exercise, tomorrow.      Keep a written pain diary of how much pain relief you experienced following the injection procedure and the length of time of pain relief you experienced pain relief. Following diagnostic injections like medial branch nerve blocks, sacroiliac joint blocks, stellate ganglion injections and other blocks, it is very important you record the specific amount of pain relief you experienced immediately after the injectionand how long it lasted. Your doctor will ask you for this information at your follow up visit.     For all injections, please keep the injection site dry and inspect the site for a couple of days. You may remove the Band-Aid the day of the injection at any time.     Some discomfort, bruising or slight swelling may occur at the injection site. This is not abnormal if it occurs.  If needed you may:    -Take over the counter medication such as Tylenol or Motrin.   -Apply an ice pack for 30 minutes, 2 to 3 times a day for the first 24 hours.     You may shower today; no soaking baths, hot tubs, whirlpools or swimming pools for two days.      If you are given steroids in your injection, it may take 3-5 days for the steroid medication to take effect. You may notice a worsening of your symptoms for 1-2 days after the injection. This is not abnormal.  You may use acetaminophen, ibuprofen, or prescription medication that your doctor may have prescribed for you if you need to do so.     A few common side effects of steroids include facial flushing, sweating, restlessness, irritability,difficulty sleeping,  increase in blood sugar, and increased blood pressure. If you have diabetes, please monitor your blood sugar at least once a day for at least 5 days. If you have poorly controlled high blood pressure, monitoryour blood pressure for at least 2 days and contact your primary care physician if these numbers are unusually high for you.      If you take aspirin or non-steroidal anti-inflammatory drugs (examples are Motrin, Advil, ibuprofen, Naprosyn, Voltaren, Relafen, etc.) you may restart these this evening, but stop taking it 3 days before your next appointment, unless instructed otherwiseby your physician.      You do not need to discontinue non-aspirin-containing pain medications prior to an injection (examples: Celebrex, tramadol, hydrocodone and acetaminophen).      If you take a blood thinning medication (Coumadin, Lovenox, Fragmin,Ticlid, Plavix, Pradaxa, etc.), please discuss this with your primary care physician/cardiologist and your pain physician. These medications MUST be discontinued before you can have an injection safely, without the risk of uncontrolled bleeding. If these medications are not discontinued for an appropriate period of time, you will not be able to receivean injection. Please adhere to instructions given to you about when to restart your blood thinning medication. If you have any questions please reach out to our team.    If you are taking Coumadin, please have your INR checked the morning of your procedure and bring the result to your appointment unless otherwise instructed. If your INR is over 1.2, your injection may need to be rescheduled to avoid uncontrolled bleeding from the needle placement.     Call [facility]  and ask for Pain Management at [phone] between 8am-4pm Monday - Friday if you are experiencing the following:    If you received an epidural or spinal injection:    -Headache that doesnot go away with medicine, is worse when sitting or standing up, and is greatly  relieved upon lying down.   -Severe pain worse than or different than your baseline pain.   -Chills or fever (101º F or greater).   -Drainage or signs of infection at the injection site     Go directly to the Emergency Department if you are experiencing the following and received an epidural or spinal injection:   -Abrupt weakness or progressive weakness in your legs that starts after you leave the clinic.   -Abrupt severe or worsening numbness in your legs.   -Inability to urinate after the injection or loss of bowel or bladder control without the urge to defecate or urinate.     If you have a clinical question that cannot wait until your next appointment, please call [phone] between 8am-4pm Monday - Friday or send a [employer] message. We do our best to return all non-emergency messages within 24 hours, Monday - Friday. A nurse or physician will return your message. You may also try calling and they will do their best to answer your question(s):   - Dr. Russel Alcaraz/Richelle's nurse ([phone])     If you need to cancel an appointment, please call the scheduling staff at [phone] during normal business hours or leave a message at least 24 hours in advance.     If you are going to be sedated for your next procedure, you MUST have responsible adult who can legally drive accompany you home. You cannot eat or drink for at least eight hours prior to the planned procedure if you are going to receive sedation. You may take your non-blood thinning medications with a small sip of water.

## 2025-07-24 NOTE — H&P
Pain Management H&P    History Of Present Illness  Armida Adkins is a 35 y.o. female presents for procedure stated below. Endorses no changes in past medical history or medical health since last seen in clinic.     Past Medical History  She has a past medical history of Acute sinusitis, unspecified (02/01/2016), Asthma, Encounter for initial prescription of contraceptive pills (08/10/2018), Encounter for removal of intrauterine contraceptive device (03/01/2016), Encounter for screening for malignant neoplasm of cervix (11/24/2015), Low back pain (04/25/2004), Myopia, bilateral (12/01/2014), Neck pain (06/13/2018), Other conditions influencing health status, Other specified health status (05/23/2017), Other specified symptoms and signs involving the digestive system and abdomen (07/09/2015), Pain in right shoulder (10/07/2020), Pelvic and perineal pain (05/28/2021), Personal history of other diseases of the digestive system (07/30/2015), Personal history of other diseases of the female genital tract (08/10/2018), Personal history of other diseases of the female genital tract (08/10/2018), Personal history of other diseases of the female genital tract (09/11/2015), Personal history of other diseases of the musculoskeletal system and connective tissue (10/13/2022), Personal history of other diseases of the respiratory system, Personal history of other specified conditions, and Unspecified astigmatism, bilateral (12/01/2014).    Surgical History  She has a past surgical history that includes Other surgical history (08/18/2020); Other surgical history (07/31/2015); IR angiogram inferior epigastric pelvic (02/09/2023); IR angiogram inferior epigastric pelvic (02/09/2023); IR angiogram inferior epigastric (02/09/2023); IR venogram lower extremity (Left, 12/11/2023); Invasive Vascular Procedure (N/A, 04/15/2024); Endometrial ablation; Vascular surgery; and Trigger point injection (Last year).     Social History  She  reports that she has never smoked. She has been exposed to tobacco smoke. She has never used smokeless tobacco. She reports current alcohol use of about 1.0 standard drink of alcohol per week. She reports that she does not use drugs.    Family History  Family History[1]     Allergies  Benzonatate, Erythromycin-benzoyl peroxide, Fruit flavor, Peach, Pineapple, Pollen extracts, and Sulfamethoxazole-trimethoprim    Review of Symptoms:   Constitutional: Negative for chills, diaphoresis or fever  HENT: Negative for neck swelling  Eyes:.  Negative for eye pain  Respiratory:.  Negative for cough, shortness of breath or wheezing    Cardiovascular:.  Negative for chest pain or palpitations  Gastrointestinal:.  Negative for abdominal pain, nausea and vomiting  Genitourinary:.  Negative for urgency  Musculoskeletal:  Positive for back pain. Positive for joint pain. Denies falls within the past 3 months.  Skin: Negative for wounds or itching   Neurological: Negative for dizziness, seizures, loss of consciousness and weakness  Endo/Heme/Allergies: Does not bruise/bleed easily  Psychiatric/Behavioral: Negative for depression. The patient does not appear anxious.      Pre-sedation Evaluation  ASA class 3  Mallampati score 2     PHYSICAL EXAM  Vitals signs reviewed  Constitutional:       General: Not in acute distress     Appearance: Normal appearance. Not ill-appearing.  HENT:     Head: Normocephalic and atraumatic  Eyes:     Conjunctiva/sclera: Conjunctivae normal  Cardiovascular:     Rate and Rhythm: Normal rate and regular rhythm  Pulmonary:     Effort: No respiratory distress  Abdominal:     Palpations: Abdomen is soft  Musculoskeletal: AMAYA  Skin:     General: Skin is warm and dry  Neurological:     General: No focal deficit present  Psychiatric:         Mood and Affect: Mood normal         Behavior: Behavior normal     Last Recorded Vitals  /78   Pulse 92   Temp 37.1 °C (98.8 °F) (Temporal)   Resp 16   SpO2 99%      Relevant Results  Current Outpatient Medications   Medication Instructions    albuterol 90 mcg/actuation inhaler 1-2 puffs, inhalation, Every 4 hours PRN    albuterol 2.5 mg, nebulization, Every 4 hours PRN    azelastine (Optivar) 0.05 % ophthalmic solution 1 drop, Both Eyes, 2 times daily PRN    budesonide-formoterol (Symbicort) 80-4.5 mcg/actuation inhaler PLEASE SEE ATTACHED FOR DETAILED DIRECTIONS    cetirizine (ZYRTEC) 10 mg, oral, Daily PRN    famotidine (PEPCID) 20 mg, oral, 2 times daily PRN    gabapentin (NEURONTIN) 300 mg, oral, Nightly    metoprolol succinate XL (TOPROL-XL) 25 mg, oral, Nightly, Do not crush or chew.    mirtazapine (REMERON) 7.5 mg, Nightly    mometasone (Nasonex) 50 mcg/actuation nasal spray 2 sprays, Each Nostril, 2 times daily    olopatadine (Pataday) 0.2 % ophthalmic solution 1 drop, ophthalmic (eye), Daily PRN         MR cervical spine wo IV contrast 08/06/2024    Narrative  Interpreted By:  Jerry Danielson,  Carmencita Tamayo  STUDY:  MR CERVICAL SPINE WO IV CONTRAST;  8/6/2024 1:20 pm    INDICATION:  Signs/Symptoms:cervical radiculopathy.    COMPARISON:  None.    ACCESSION NUMBER(S):  [id number]    ORDERING CLINICIAN:  SISI JENKINS    TECHNIQUE:  Sagittal T1, T2, STIR, axial T1 and axial T2 weighted images were  acquired through the cervical spine.    FINDINGS:  Alignment: The vertebral alignment is within normal limits.    Vertebrae/Intervertebral Discs: The vertebral bodies demonstrate  expected height.  The marrow signal is within normal limits. Mild  multilevel disc desiccation and disc height loss with endplate  degenerative changes and osteophytic spurring.    Cord: Normal in caliber and signal.    C1-C2: The cervicomedullary junction appears unremarkable. There is  no spinal canal stenosis.    C2-C3: There is no posterior disc contour abnormality. There is no  significant spinal canal or neural foraminal stenosis.    C3-C4: Mild disc bulge mildly effaces the ventral thecal  sac. There  is no significant spinal canal or neural foraminal stenosis.    C4-C5: Mild disc bulge mildly effaces the ventral thecal sac. There  is no significant spinal canal or neural foraminal stenosis.    C5-C6: Mild disc bulge mildly effaces the ventral thecal sac. There  is no significant spinal canal or neural foraminal stenosis.    C6-C7: Disc bulge with a superimposed right paracentral/right  foraminal disc herniation. There is effacement of the  right-greater-than-left ventral CSF space. There is mild spinal canal  stenosis. There is effacement of the right lateral recess. There is  moderate right neural foraminal stenosis. Left neural foraminal  stenosis is widely patent.    C7-T1: There is no posterior disc contour abnormality. There is no  significant spinal canal or neural foraminal stenosis.    The upper thoracic vertebrae and spinal canal are unremarkable.    The prevertebral and posterior paraspinous soft tissues are within  normal limits.    Impression  Mild multilevel degenerative changes of the cervical spine. There is  mild spinal canal stenosis at C6-C7 secondary to disc bulge with  superimposed right paracentral/foraminal disc herniation resulting in  partial effacement of the ventral CSF space. There is moderate right  C6-C7 neural foraminal stenosis.    I personally reviewed the images/study and I agree with the findings  as stated by Resident Belkis Subramanian. This study was interpreted at  [place], [place].    MACRO:  None    Signed by: Jerry Danielson 8/6/2024 7:31 PM  Dictation workstation:   [id number]       ASSESSMENT/PLAN  Armida Adkins is a 35 y.o. female here for hypogastric plexus block w/ fluoroscopic guidance    she denies any recent antibiotic use or infections, she denies any blood thinner use , and she denies contrast or local anesthetic allergies     Risks, benefits, alternatives discussed. All questions answered to the best of my  ability. Patient agrees to proceed.      Our plan is as follows:  - Proceed with aforementioned procedure     Oliva Adrian, DO  Chronic Pain Management Fellow         [1]   Family History  Problem Relation Name Age of Onset    Uterine cancer Mother Vashti Kirkland     Seizures Mother Vashti Kirkland     Cancer Mother Vashti Kirkland     Other (cerebrovascular accident) Father Elian Matamoros Jr.     Cancer Father Elian Matamoros Jr.     Stroke Father Elian Matamoros Jr.     Uterine cancer Sister      Colon cancer Mother's Brother Guille Isael

## 2025-07-30 DIAGNOSIS — R06.02 SOB (SHORTNESS OF BREATH): ICD-10-CM

## 2025-07-30 DIAGNOSIS — I28.8 ENLARGED PULMONARY ARTERY (MULTI): ICD-10-CM

## 2025-08-06 ENCOUNTER — APPOINTMENT (OUTPATIENT)
Dept: PRIMARY CARE | Facility: CLINIC | Age: 35
End: 2025-08-06
Payer: MEDICARE

## 2025-08-06 VITALS
HEIGHT: 64 IN | WEIGHT: 169 LBS | HEART RATE: 76 BPM | DIASTOLIC BLOOD PRESSURE: 76 MMHG | BODY MASS INDEX: 28.85 KG/M2 | SYSTOLIC BLOOD PRESSURE: 117 MMHG

## 2025-08-06 DIAGNOSIS — G43.E01 CHRONIC MIGRAINE WITH AURA AND WITH STATUS MIGRAINOSUS, NOT INTRACTABLE: Primary | ICD-10-CM

## 2025-08-06 DIAGNOSIS — I48.91 ATRIAL FIBRILLATION, UNSPECIFIED TYPE (MULTI): ICD-10-CM

## 2025-08-06 PROCEDURE — 3008F BODY MASS INDEX DOCD: CPT | Performed by: INTERNAL MEDICINE

## 2025-08-06 PROCEDURE — G2211 COMPLEX E/M VISIT ADD ON: HCPCS | Performed by: INTERNAL MEDICINE

## 2025-08-06 PROCEDURE — 99214 OFFICE O/P EST MOD 30 MIN: CPT | Performed by: INTERNAL MEDICINE

## 2025-08-06 PROCEDURE — 1036F TOBACCO NON-USER: CPT | Performed by: INTERNAL MEDICINE

## 2025-08-06 ASSESSMENT — PATIENT HEALTH QUESTIONNAIRE - PHQ9
1. LITTLE INTEREST OR PLEASURE IN DOING THINGS: NOT AT ALL
2. FEELING DOWN, DEPRESSED OR HOPELESS: NOT AT ALL
SUM OF ALL RESPONSES TO PHQ9 QUESTIONS 1 AND 2: 0

## 2025-08-06 ASSESSMENT — ENCOUNTER SYMPTOMS
NECK PAIN: 1
PSYCHIATRIC NEGATIVE: 1
RESPIRATORY NEGATIVE: 1
CONSTITUTIONAL NEGATIVE: 1
CARDIOVASCULAR NEGATIVE: 1
NEUROLOGICAL NEGATIVE: 1
EYES NEGATIVE: 1
GASTROINTESTINAL NEGATIVE: 1

## 2025-08-06 ASSESSMENT — LIFESTYLE VARIABLES
AUDIT-C TOTAL SCORE: 1
HOW OFTEN DO YOU HAVE A DRINK CONTAINING ALCOHOL: MONTHLY OR LESS
SKIP TO QUESTIONS 9-10: 1
HOW OFTEN DO YOU HAVE SIX OR MORE DRINKS ON ONE OCCASION: NEVER
HOW MANY STANDARD DRINKS CONTAINING ALCOHOL DO YOU HAVE ON A TYPICAL DAY: 1 OR 2

## 2025-08-06 NOTE — PROGRESS NOTES
"Subjective     One bad migraine a couple of years ago. Her head hurt so bad she wanted to bang her head against the wall at that time. No migraine meds in past. She endorses a smell before her headache. Ocular frontal and temple regions, sharp pains. It last throughout the day and resolves on its own. Last migraine on July second and third. She attributes it the high heat at that time. She states she gets headaches from too much light. She states the room is spinning. If she gets up too fast, she gets dizzy. She states NSAIDs do not help relieve migraines. Denies LOC, dizziness with position change when in bed, chest pain, palpitations, and abdominal pain.    After speaking about her elevated alk phos levels. She endorsed RUQ pain that worsens when she eats.        Review of Systems   Constitutional: Negative.    HENT: Negative.     Eyes: Negative.    Respiratory: Negative.     Cardiovascular: Negative.    Gastrointestinal: Negative.    Genitourinary: Negative.    Musculoskeletal:  Positive for neck pain.        Significant cervical spine issues   Neurological: Negative.    Psychiatric/Behavioral: Negative.         Objective   /76 (BP Location: Left arm, Patient Position: Sitting, BP Cuff Size: Adult)   Pulse 76   Ht 1.626 m (5' 4\")   Wt 76.7 kg (169 lb)   LMP 07/24/2025 Comment: periods are irregular, no chance of being pregnant  BMI 29.01 kg/m²     Physical Exam  Constitutional:       Appearance: Normal appearance.     Cardiovascular:      Rate and Rhythm: Normal rate and regular rhythm.      Pulses: Normal pulses.      Heart sounds: Normal heart sounds.   Pulmonary:      Effort: Pulmonary effort is normal.      Breath sounds: Normal breath sounds.   Abdominal:      Palpations: Abdomen is soft.      Tenderness: There is abdominal tenderness.      Comments: RUQ    Band like area across lower abdomen     Musculoskeletal:      Right lower leg: No edema.      Left lower leg: No edema.     Skin:     General: " Skin is warm and dry.      Capillary Refill: Capillary refill takes less than 2 seconds.     Neurological:      General: No focal deficit present.      Mental Status: She is alert and oriented to person, place, and time.     Psychiatric:         Mood and Affect: Mood normal.         Behavior: Behavior normal.         Assessment/Plan   Problem List Items Addressed This Visit       Migraine headache - Primary    Atrial fibrillation, unspecified type (Multi)     Assessment and Plan    Armida Adkins is a 35 y.o. F w/ a PMH of asthma, depression, hx pelvic congestion syndrome s/p embolization, hx chronic pelvic pain and endometriosis, hx AVNRT s/p ablation 2023, hx afib w rvr now off Eliquis, hx vitamin d deficiency, anemia, GERD, and migraine disorder.       #Chronic migraine  #AVNRT status post ablation   #A-fib not on eliquis  - She endorses aura, continuous headache throughout the day which is not relieved by NSAIDs, and photosensitivity  - Most recent episode was July 3  - Sumatriptan contraindicated due to history of arrhythmia  - Provided Ubrelvy samples    #Elevated alk phos  - She endorses right upper quadrant which worsens when eating  - Right upper quadrant tenderness on exam  - Will consider ultrasound  - Will consider GGT    #Health Maintenance  - Follow-up appointment scheduled for October    Сергей Mercado MD  Internal Medicine, PGY-1

## 2025-08-06 NOTE — PATIENT INSTRUCTIONS
Please take the sample of Ubrelvy when you feel a migraine starting. You can take 1 tablet and then another one 2 hours later if migraine continues.    Please keep a diary of detailed information about the migraines, as discussed.    Avoid direct heat and stay hydrated.

## 2025-09-23 ENCOUNTER — APPOINTMENT (OUTPATIENT)
Dept: OBSTETRICS AND GYNECOLOGY | Facility: CLINIC | Age: 35
End: 2025-09-23
Payer: MEDICARE

## 2025-09-23 ENCOUNTER — APPOINTMENT (OUTPATIENT)
Dept: CARDIOLOGY | Facility: HOSPITAL | Age: 35
End: 2025-09-23
Payer: MEDICARE

## 2025-09-23 ENCOUNTER — APPOINTMENT (OUTPATIENT)
Dept: PULMONOLOGY | Facility: HOSPITAL | Age: 35
End: 2025-09-23
Payer: MEDICARE

## 2025-09-23 ENCOUNTER — APPOINTMENT (OUTPATIENT)
Dept: RESPIRATORY THERAPY | Facility: HOSPITAL | Age: 35
End: 2025-09-23
Payer: MEDICARE

## 2025-10-01 ENCOUNTER — APPOINTMENT (OUTPATIENT)
Dept: PRIMARY CARE | Facility: CLINIC | Age: 35
End: 2025-10-01
Payer: MEDICARE

## (undated) DEVICE — DRAPE, SHEET, LAPAROTOMY, W/ISO-BAC, W/ARMBOARD COVERS, 98 X 122 IN, DISPOSABLE, LF, STERILE

## (undated) DEVICE — Device

## (undated) DEVICE — SOLUTION, IRRIGATION, SODIUM CHLORIDE 0.9%, 1000 ML, POUR BOTTLE

## (undated) DEVICE — DEVICE, INFLATION, ENCORE 26 (5/BOX)

## (undated) DEVICE — GLOVE, SURGICAL, PROTEXIS PI , 6.5, PF, LF

## (undated) DEVICE — SHEATH, 45CM, W/DILATOR, 6 FR DIA, ST CURVE STYLE W/CROSS-CUT VALVE

## (undated) DEVICE — TORQUE DEVICE, ACCOMODATES WIRES W/DIA .010 TO .038"."

## (undated) DEVICE — SHEATH, PINNACLE, 10 CM,  6FR INTRODUCER, 6FR DIA, 2.5 CM DIALATOR

## (undated) DEVICE — STRAP, CIRCUMFERENTIAL, 2 X 76""

## (undated) DEVICE — SYRINGE, 10 CC, LUER LOCK

## (undated) DEVICE — COVER, CART, 45 X 27 X 48 IN, CLEAR

## (undated) DEVICE — NEEDLE, HYPODERMIC, MONOJECT, 18 G X 1.5 IN

## (undated) DEVICE — MANIFOLD, 4 PORT NEPTUNE STANDARD

## (undated) DEVICE — PROTECTOR, NERVE, ULNAR, PINK

## (undated) DEVICE — GLOVE, SURGEON, PREMIERPRO PI, MICRO, SZ-6.0, PF, WH

## (undated) DEVICE — INTRODUCER SET, MICROPUNCT, STIFF, 4FR 10CM,PLATINUM TIP,NITINOLWIRE

## (undated) DEVICE — STOPCOCK, 4 WAY, SMALL BODY, W/SWIVEL, ULTRA, LIPD RESISTANT, LUER LOCK, MALE, LF

## (undated) DEVICE — DRESSING, TRANSPARENT, TEGADERM, 2-3/8 X 2-3/4 IN

## (undated) DEVICE — GLOVE, SURGICAL, PROTEXIS PI MICRO, 6.0, PF, LF

## (undated) DEVICE — CATHETER, QUICKCROSS SUPPORT .018 X 150CM

## (undated) DEVICE — TRAY, PARACERVICAL BLOCK, CUSTOM

## (undated) DEVICE — DRAPE, SHEET, THYROID, W/ARMBOARD COVER, 100 X 121 IN, DISPOSABLE, LF, STERILE

## (undated) DEVICE — DRAPE, TIBURON, SPLIT SHEET, REINF ADH STRIP, 77X122

## (undated) DEVICE — CATHETER, VISIONS PV 8.2 (IVUS)

## (undated) DEVICE — HANDLE, DETACHMENT, RUBY COIL

## (undated) DEVICE — GLOVE, SURGICAL, PROTEXIS PI ORTHO, 6.5, PF, LF

## (undated) DEVICE — SHEATH, INTRODUCER, 25CM, 8FR, R/O RADIOPAQUE MARKER, 8FR DIA, 2.5 CM DILATOR.

## (undated) DEVICE — EXTENSION SET W/MALE LUER LOCK ADAPTER, VOLUME 2.4ML

## (undated) DEVICE — GUIDEWIRE, STIFF SHAFT, ANGLE TIP, .035 DIA, 260 CM,  3 CM TIP"

## (undated) DEVICE — CATHETER, QUICKCROSS SUPPORT .035 X 90CM

## (undated) DEVICE — CATHETER, TEMPO, UNIV FLUSH, 4FR, 65CM

## (undated) DEVICE — GUIDEWIRE, .035 X 260 BENTSON STR

## (undated) DEVICE — SHEATH, PINNACLE, 10 CM,  5FR INTRODUCER, 5FR DIA, 2.5 CM DIALATOR

## (undated) DEVICE — MICROCATHETER, DELIVERY, LANTERN, 45 DEG, 0.025 X 135CM

## (undated) DEVICE — GLOVE, SURGICAL, BIOGEL, 6.5, PF, LATEX, GREEN

## (undated) DEVICE — BRIEF, CURITY, XLARGE, MESH

## (undated) DEVICE — CATHETER, DIAGNOSTIC 5FR,X100CM.035 BERENSTEIN

## (undated) DEVICE — ADHESIVE, SKIN, LIQUIBAND EXCEED

## (undated) DEVICE — TUBING, HIGH PRESSURE, 72 IN (1200 PSI)

## (undated) DEVICE — SLEEVE, SURGICAL, 21.5 X 5.5 IN, LF, STERILE

## (undated) DEVICE — PAD, SANITARY, OBSTETRICAL, W/ADHSV STRIP,11 IN,LF

## (undated) DEVICE — GUIDEWIRE, ANGLE TIP,  .035 DIA, 260 CM, 3 CM TIP"

## (undated) DEVICE — CATHETER, 5 FR. 100CM, ANGLE TAPER AT TIP

## (undated) DEVICE — COVER PROBE, SOFT FLEX W/ GEL, 5 X 48 IN (13X122CM)

## (undated) DEVICE — GLOVE, SURGEON, PREMIERPRO PI, MICRO, SZ-6.5, PF, WH

## (undated) DEVICE — TOWEL, SURGICAL, NEURO, O/R, 16 X 26, BLUE, STERILE

## (undated) DEVICE — DEVICE, INFLATION, ENCORE 20

## (undated) DEVICE — COVER HANDLE LIGHT, STERIS, BLUE, STERILE